# Patient Record
Sex: FEMALE | Race: OTHER | URBAN - METROPOLITAN AREA
[De-identification: names, ages, dates, MRNs, and addresses within clinical notes are randomized per-mention and may not be internally consistent; named-entity substitution may affect disease eponyms.]

---

## 2017-01-02 DIAGNOSIS — R11.0 NAUSEA: ICD-10-CM

## 2017-01-03 ENCOUNTER — OFFICE VISIT (OUTPATIENT)
Dept: PAIN MANAGEMENT | Age: 32
End: 2017-01-03

## 2017-01-03 VITALS
BODY MASS INDEX: 20.6 KG/M2 | HEART RATE: 109 BPM | SYSTOLIC BLOOD PRESSURE: 100 MMHG | WEIGHT: 120 LBS | DIASTOLIC BLOOD PRESSURE: 60 MMHG

## 2017-01-03 DIAGNOSIS — F51.01 PRIMARY INSOMNIA: ICD-10-CM

## 2017-01-03 DIAGNOSIS — R10.2 CHRONIC PELVIC PAIN IN FEMALE: ICD-10-CM

## 2017-01-03 DIAGNOSIS — M79.7 FIBROMYALGIA: ICD-10-CM

## 2017-01-03 DIAGNOSIS — F33.9 RECURRENT MAJOR DEPRESSIVE DISORDER, REMISSION STATUS UNSPECIFIED (HCC): ICD-10-CM

## 2017-01-03 DIAGNOSIS — G89.29 CHRONIC PELVIC PAIN IN FEMALE: ICD-10-CM

## 2017-01-03 DIAGNOSIS — G89.4 CHRONIC PAIN SYNDROME: ICD-10-CM

## 2017-01-03 PROCEDURE — 99213 OFFICE O/P EST LOW 20 MIN: CPT | Performed by: INTERNAL MEDICINE

## 2017-01-03 RX ORDER — NAPROXEN 500 MG/1
500 TABLET ORAL 2 TIMES DAILY WITH MEALS
Qty: 60 TABLET | Refills: 1 | Status: SHIPPED | OUTPATIENT
Start: 2017-01-03 | End: 2017-01-30 | Stop reason: SDUPTHER

## 2017-01-03 RX ORDER — GABAPENTIN 400 MG/1
CAPSULE ORAL
Qty: 120 CAPSULE | Refills: 1 | Status: SHIPPED | OUTPATIENT
Start: 2017-01-03 | End: 2017-01-30 | Stop reason: SDUPTHER

## 2017-01-03 RX ORDER — OXYCODONE HYDROCHLORIDE AND ACETAMINOPHEN 5; 325 MG/1; MG/1
TABLET ORAL
Qty: 30 TABLET | Refills: 0 | Status: SHIPPED | OUTPATIENT
Start: 2017-01-03 | End: 2017-01-30 | Stop reason: SDUPTHER

## 2017-01-03 RX ORDER — SERTRALINE HYDROCHLORIDE 100 MG/1
TABLET, FILM COATED ORAL
Qty: 30 TABLET | Refills: 1 | Status: SHIPPED | OUTPATIENT
Start: 2017-01-03 | End: 2017-01-30 | Stop reason: SDUPTHER

## 2017-01-03 RX ORDER — PROMETHAZINE HYDROCHLORIDE 25 MG/1
TABLET ORAL
Qty: 45 TABLET | Refills: 0 | Status: SHIPPED | OUTPATIENT
Start: 2017-01-03 | End: 2017-01-23 | Stop reason: SDUPTHER

## 2017-01-03 RX ORDER — CYCLOBENZAPRINE HCL 10 MG
TABLET ORAL
Qty: 30 TABLET | Refills: 1 | Status: SHIPPED | OUTPATIENT
Start: 2017-01-03 | End: 2017-01-30 | Stop reason: SDUPTHER

## 2017-01-06 ENCOUNTER — OFFICE VISIT (OUTPATIENT)
Dept: FAMILY MEDICINE CLINIC | Age: 32
End: 2017-01-06

## 2017-01-06 VITALS
WEIGHT: 139 LBS | HEART RATE: 118 BPM | DIASTOLIC BLOOD PRESSURE: 70 MMHG | BODY MASS INDEX: 23.86 KG/M2 | OXYGEN SATURATION: 96 % | SYSTOLIC BLOOD PRESSURE: 120 MMHG | RESPIRATION RATE: 16 BRPM

## 2017-01-06 DIAGNOSIS — J40 BRONCHITIS: Primary | ICD-10-CM

## 2017-01-06 DIAGNOSIS — K13.79 MOUTH SORES: ICD-10-CM

## 2017-01-06 PROCEDURE — 99213 OFFICE O/P EST LOW 20 MIN: CPT | Performed by: NURSE PRACTITIONER

## 2017-01-06 RX ORDER — AZITHROMYCIN 250 MG/1
250 TABLET, FILM COATED ORAL DAILY
Qty: 6 TABLET | Refills: 0 | Status: SHIPPED | OUTPATIENT
Start: 2017-01-06 | End: 2017-01-30 | Stop reason: ALTCHOICE

## 2017-01-06 RX ORDER — DEXTROMETHORPHAN HYDROBROMIDE AND PROMETHAZINE HYDROCHLORIDE 15; 6.25 MG/5ML; MG/5ML
5 SYRUP ORAL NIGHTLY PRN
Qty: 180 ML | Refills: 0 | Status: SHIPPED | OUTPATIENT
Start: 2017-01-06 | End: 2017-08-24 | Stop reason: ALTCHOICE

## 2017-01-06 ASSESSMENT — ENCOUNTER SYMPTOMS
SORE THROAT: 1
COUGH: 1
WHEEZING: 1
SHORTNESS OF BREATH: 1
SPUTUM PRODUCTION: 1

## 2017-01-08 LAB

## 2017-01-23 DIAGNOSIS — R11.0 NAUSEA: ICD-10-CM

## 2017-01-23 RX ORDER — PROMETHAZINE HYDROCHLORIDE 25 MG/1
TABLET ORAL
Qty: 45 TABLET | Refills: 0 | Status: SHIPPED | OUTPATIENT
Start: 2017-01-23 | End: 2017-02-08 | Stop reason: SDUPTHER

## 2017-01-30 ENCOUNTER — OFFICE VISIT (OUTPATIENT)
Dept: PAIN MANAGEMENT | Age: 32
End: 2017-01-30

## 2017-01-30 VITALS
HEART RATE: 104 BPM | BODY MASS INDEX: 23.86 KG/M2 | DIASTOLIC BLOOD PRESSURE: 63 MMHG | SYSTOLIC BLOOD PRESSURE: 107 MMHG | WEIGHT: 139 LBS

## 2017-01-30 DIAGNOSIS — G89.29 CHRONIC PELVIC PAIN IN FEMALE: ICD-10-CM

## 2017-01-30 DIAGNOSIS — G89.4 CHRONIC PAIN SYNDROME: ICD-10-CM

## 2017-01-30 DIAGNOSIS — M79.7 FIBROMYALGIA: ICD-10-CM

## 2017-01-30 DIAGNOSIS — N80.9 ENDOMETRIOSIS: ICD-10-CM

## 2017-01-30 DIAGNOSIS — R10.2 CHRONIC PELVIC PAIN IN FEMALE: ICD-10-CM

## 2017-01-30 PROCEDURE — 99213 OFFICE O/P EST LOW 20 MIN: CPT | Performed by: INTERNAL MEDICINE

## 2017-01-30 RX ORDER — CYCLOBENZAPRINE HCL 10 MG
TABLET ORAL
Qty: 30 TABLET | Refills: 1 | Status: SHIPPED | OUTPATIENT
Start: 2017-01-30 | End: 2017-03-27 | Stop reason: SDUPTHER

## 2017-01-30 RX ORDER — SERTRALINE HYDROCHLORIDE 100 MG/1
TABLET, FILM COATED ORAL
Qty: 30 TABLET | Refills: 1 | Status: SHIPPED | OUTPATIENT
Start: 2017-01-30 | End: 2017-02-27 | Stop reason: DRUGHIGH

## 2017-01-30 RX ORDER — OXYCODONE HYDROCHLORIDE AND ACETAMINOPHEN 5; 325 MG/1; MG/1
TABLET ORAL
Qty: 30 TABLET | Refills: 0 | Status: SHIPPED | OUTPATIENT
Start: 2017-01-30 | End: 2017-02-27 | Stop reason: SDUPTHER

## 2017-01-30 RX ORDER — NAPROXEN 500 MG/1
500 TABLET ORAL 2 TIMES DAILY WITH MEALS
Qty: 60 TABLET | Refills: 1 | Status: SHIPPED | OUTPATIENT
Start: 2017-01-30 | End: 2017-03-27 | Stop reason: SDUPTHER

## 2017-01-30 RX ORDER — GABAPENTIN 400 MG/1
CAPSULE ORAL
Qty: 120 CAPSULE | Refills: 1 | Status: SHIPPED | OUTPATIENT
Start: 2017-01-30 | End: 2017-03-27 | Stop reason: SDUPTHER

## 2017-02-08 DIAGNOSIS — R11.0 NAUSEA: ICD-10-CM

## 2017-02-08 RX ORDER — PROMETHAZINE HYDROCHLORIDE 25 MG/1
TABLET ORAL
Qty: 45 TABLET | Refills: 0 | Status: SHIPPED | OUTPATIENT
Start: 2017-02-08 | End: 2017-02-27 | Stop reason: SDUPTHER

## 2017-02-27 ENCOUNTER — OFFICE VISIT (OUTPATIENT)
Dept: PAIN MANAGEMENT | Age: 32
End: 2017-02-27

## 2017-02-27 VITALS
SYSTOLIC BLOOD PRESSURE: 101 MMHG | BODY MASS INDEX: 21.46 KG/M2 | DIASTOLIC BLOOD PRESSURE: 67 MMHG | HEART RATE: 90 BPM | WEIGHT: 125 LBS

## 2017-02-27 DIAGNOSIS — N94.89 PELVIC PAIN SYNDROME: ICD-10-CM

## 2017-02-27 DIAGNOSIS — M79.7 FIBROMYALGIA: ICD-10-CM

## 2017-02-27 DIAGNOSIS — F33.9 RECURRENT MAJOR DEPRESSIVE DISORDER, REMISSION STATUS UNSPECIFIED (HCC): ICD-10-CM

## 2017-02-27 DIAGNOSIS — N80.9 ENDOMETRIOSIS: ICD-10-CM

## 2017-02-27 DIAGNOSIS — F51.01 PRIMARY INSOMNIA: ICD-10-CM

## 2017-02-27 DIAGNOSIS — G89.4 CHRONIC PAIN SYNDROME: ICD-10-CM

## 2017-02-27 DIAGNOSIS — R11.0 NAUSEA: ICD-10-CM

## 2017-02-27 PROCEDURE — 99213 OFFICE O/P EST LOW 20 MIN: CPT | Performed by: INTERNAL MEDICINE

## 2017-02-27 RX ORDER — OXYCODONE HYDROCHLORIDE AND ACETAMINOPHEN 5; 325 MG/1; MG/1
TABLET ORAL
Qty: 30 TABLET | Refills: 0 | Status: SHIPPED | OUTPATIENT
Start: 2017-02-27 | End: 2017-03-27 | Stop reason: SDUPTHER

## 2017-02-28 RX ORDER — PROMETHAZINE HYDROCHLORIDE 25 MG/1
TABLET ORAL
Qty: 45 TABLET | Refills: 0 | Status: SHIPPED | OUTPATIENT
Start: 2017-02-28 | End: 2017-03-20 | Stop reason: SDUPTHER

## 2017-03-20 DIAGNOSIS — R11.0 NAUSEA: ICD-10-CM

## 2017-03-20 RX ORDER — PROMETHAZINE HYDROCHLORIDE 25 MG/1
TABLET ORAL
Qty: 45 TABLET | Refills: 0 | Status: SHIPPED | OUTPATIENT
Start: 2017-03-20 | End: 2017-04-04 | Stop reason: SDUPTHER

## 2017-03-27 ENCOUNTER — OFFICE VISIT (OUTPATIENT)
Dept: PAIN MANAGEMENT | Age: 32
End: 2017-03-27

## 2017-03-27 VITALS
HEART RATE: 95 BPM | SYSTOLIC BLOOD PRESSURE: 118 MMHG | WEIGHT: 125 LBS | BODY MASS INDEX: 21.46 KG/M2 | DIASTOLIC BLOOD PRESSURE: 70 MMHG

## 2017-03-27 DIAGNOSIS — M79.7 FIBROMYALGIA: ICD-10-CM

## 2017-03-27 DIAGNOSIS — G89.29 CHRONIC PELVIC PAIN IN FEMALE: ICD-10-CM

## 2017-03-27 DIAGNOSIS — R10.2 CHRONIC PELVIC PAIN IN FEMALE: ICD-10-CM

## 2017-03-27 DIAGNOSIS — F51.01 PRIMARY INSOMNIA: ICD-10-CM

## 2017-03-27 DIAGNOSIS — N94.89 PELVIC PAIN SYNDROME: ICD-10-CM

## 2017-03-27 DIAGNOSIS — F33.9 RECURRENT MAJOR DEPRESSIVE DISORDER, REMISSION STATUS UNSPECIFIED (HCC): ICD-10-CM

## 2017-03-27 DIAGNOSIS — G89.4 CHRONIC PAIN SYNDROME: ICD-10-CM

## 2017-03-27 PROCEDURE — 99213 OFFICE O/P EST LOW 20 MIN: CPT | Performed by: INTERNAL MEDICINE

## 2017-03-27 RX ORDER — OXYCODONE HYDROCHLORIDE AND ACETAMINOPHEN 5; 325 MG/1; MG/1
TABLET ORAL
Qty: 30 TABLET | Refills: 0 | Status: SHIPPED | OUTPATIENT
Start: 2017-03-27 | End: 2017-04-27 | Stop reason: SDUPTHER

## 2017-03-27 RX ORDER — GABAPENTIN 400 MG/1
CAPSULE ORAL
Qty: 120 CAPSULE | Refills: 1 | Status: SHIPPED | OUTPATIENT
Start: 2017-03-27 | End: 2017-04-27 | Stop reason: SDUPTHER

## 2017-03-27 RX ORDER — NAPROXEN 500 MG/1
500 TABLET ORAL 2 TIMES DAILY WITH MEALS
Qty: 60 TABLET | Refills: 1 | Status: SHIPPED | OUTPATIENT
Start: 2017-03-27 | End: 2017-04-27 | Stop reason: SDUPTHER

## 2017-03-27 RX ORDER — CYCLOBENZAPRINE HCL 10 MG
TABLET ORAL
Qty: 30 TABLET | Refills: 1 | Status: SHIPPED | OUTPATIENT
Start: 2017-03-27 | End: 2017-04-27 | Stop reason: SDUPTHER

## 2017-04-04 DIAGNOSIS — R11.0 NAUSEA: ICD-10-CM

## 2017-04-04 RX ORDER — PROMETHAZINE HYDROCHLORIDE 25 MG/1
TABLET ORAL
Qty: 45 TABLET | Refills: 0 | Status: SHIPPED | OUTPATIENT
Start: 2017-04-04 | End: 2017-04-20 | Stop reason: SDUPTHER

## 2017-04-14 DIAGNOSIS — G89.29 CHRONIC PELVIC PAIN IN FEMALE: ICD-10-CM

## 2017-04-14 DIAGNOSIS — G89.4 CHRONIC PAIN SYNDROME: ICD-10-CM

## 2017-04-14 DIAGNOSIS — M79.7 FIBROMYALGIA: ICD-10-CM

## 2017-04-14 DIAGNOSIS — R10.2 CHRONIC PELVIC PAIN IN FEMALE: ICD-10-CM

## 2017-04-14 RX ORDER — CYCLOBENZAPRINE HCL 10 MG
TABLET ORAL
Qty: 30 TABLET | Refills: 0 | OUTPATIENT
Start: 2017-04-14

## 2017-04-20 DIAGNOSIS — R11.0 NAUSEA: ICD-10-CM

## 2017-04-20 RX ORDER — PROMETHAZINE HYDROCHLORIDE 25 MG/1
TABLET ORAL
Qty: 45 TABLET | Refills: 0 | Status: SHIPPED | OUTPATIENT
Start: 2017-04-20 | End: 2017-05-05 | Stop reason: SDUPTHER

## 2017-04-27 ENCOUNTER — OFFICE VISIT (OUTPATIENT)
Dept: PAIN MANAGEMENT | Age: 32
End: 2017-04-27

## 2017-04-27 VITALS
BODY MASS INDEX: 25.23 KG/M2 | SYSTOLIC BLOOD PRESSURE: 104 MMHG | DIASTOLIC BLOOD PRESSURE: 62 MMHG | WEIGHT: 147 LBS | HEART RATE: 89 BPM

## 2017-04-27 DIAGNOSIS — G89.29 CHRONIC PELVIC PAIN IN FEMALE: ICD-10-CM

## 2017-04-27 DIAGNOSIS — M79.7 FIBROMYALGIA: ICD-10-CM

## 2017-04-27 DIAGNOSIS — G89.4 CHRONIC PAIN SYNDROME: ICD-10-CM

## 2017-04-27 DIAGNOSIS — N94.89 PELVIC PAIN SYNDROME: ICD-10-CM

## 2017-04-27 DIAGNOSIS — R10.2 CHRONIC PELVIC PAIN IN FEMALE: ICD-10-CM

## 2017-04-27 PROCEDURE — 99213 OFFICE O/P EST LOW 20 MIN: CPT | Performed by: INTERNAL MEDICINE

## 2017-04-27 RX ORDER — OXYCODONE HYDROCHLORIDE AND ACETAMINOPHEN 5; 325 MG/1; MG/1
TABLET ORAL
Qty: 42 TABLET | Refills: 0 | Status: SHIPPED | OUTPATIENT
Start: 2017-04-27 | End: 2017-06-08 | Stop reason: SDUPTHER

## 2017-04-27 RX ORDER — NAPROXEN 500 MG/1
500 TABLET ORAL 2 TIMES DAILY WITH MEALS
Qty: 60 TABLET | Refills: 1 | Status: SHIPPED | OUTPATIENT
Start: 2017-04-27 | End: 2017-06-08 | Stop reason: SDUPTHER

## 2017-04-27 RX ORDER — CYCLOBENZAPRINE HCL 10 MG
TABLET ORAL
Qty: 30 TABLET | Refills: 1 | Status: SHIPPED | OUTPATIENT
Start: 2017-04-27 | End: 2017-06-08 | Stop reason: SDUPTHER

## 2017-04-27 RX ORDER — GABAPENTIN 400 MG/1
CAPSULE ORAL
Qty: 120 CAPSULE | Refills: 1 | Status: SHIPPED | OUTPATIENT
Start: 2017-04-27 | End: 2017-06-08 | Stop reason: SDUPTHER

## 2017-05-05 DIAGNOSIS — R11.0 NAUSEA: ICD-10-CM

## 2017-05-05 RX ORDER — PROMETHAZINE HYDROCHLORIDE 25 MG/1
TABLET ORAL
Qty: 45 TABLET | Refills: 0 | Status: SHIPPED | OUTPATIENT
Start: 2017-05-05 | End: 2017-05-19 | Stop reason: SDUPTHER

## 2017-05-19 DIAGNOSIS — R11.0 NAUSEA: ICD-10-CM

## 2017-05-19 RX ORDER — PROMETHAZINE HYDROCHLORIDE 25 MG/1
TABLET ORAL
Qty: 45 TABLET | Refills: 0 | Status: SHIPPED | OUTPATIENT
Start: 2017-05-19 | End: 2017-06-09 | Stop reason: SDUPTHER

## 2017-05-22 ENCOUNTER — TELEPHONE (OUTPATIENT)
Dept: FAMILY MEDICINE CLINIC | Age: 32
End: 2017-05-22

## 2017-06-08 ENCOUNTER — OFFICE VISIT (OUTPATIENT)
Dept: PAIN MANAGEMENT | Age: 32
End: 2017-06-08

## 2017-06-08 VITALS
WEIGHT: 140 LBS | BODY MASS INDEX: 24.03 KG/M2 | HEART RATE: 89 BPM | DIASTOLIC BLOOD PRESSURE: 69 MMHG | SYSTOLIC BLOOD PRESSURE: 108 MMHG

## 2017-06-08 DIAGNOSIS — M79.7 FIBROMYALGIA: ICD-10-CM

## 2017-06-08 DIAGNOSIS — F51.01 PRIMARY INSOMNIA: ICD-10-CM

## 2017-06-08 DIAGNOSIS — G89.4 CHRONIC PAIN SYNDROME: ICD-10-CM

## 2017-06-08 DIAGNOSIS — N94.89 PELVIC PAIN SYNDROME: ICD-10-CM

## 2017-06-08 PROCEDURE — 99213 OFFICE O/P EST LOW 20 MIN: CPT | Performed by: INTERNAL MEDICINE

## 2017-06-08 RX ORDER — OXYCODONE HYDROCHLORIDE AND ACETAMINOPHEN 5; 325 MG/1; MG/1
TABLET ORAL
Qty: 42 TABLET | Refills: 0 | Status: SHIPPED | OUTPATIENT
Start: 2017-06-08 | End: 2017-07-20 | Stop reason: SDUPTHER

## 2017-06-08 RX ORDER — CYCLOBENZAPRINE HCL 10 MG
TABLET ORAL
Qty: 30 TABLET | Refills: 1 | Status: SHIPPED | OUTPATIENT
Start: 2017-06-08 | End: 2017-07-20 | Stop reason: SDUPTHER

## 2017-06-08 RX ORDER — NAPROXEN 500 MG/1
500 TABLET ORAL 2 TIMES DAILY WITH MEALS
Qty: 60 TABLET | Refills: 1 | Status: SHIPPED | OUTPATIENT
Start: 2017-06-08 | End: 2017-07-20 | Stop reason: SDUPTHER

## 2017-06-08 RX ORDER — GABAPENTIN 400 MG/1
CAPSULE ORAL
Qty: 120 CAPSULE | Refills: 1 | Status: SHIPPED | OUTPATIENT
Start: 2017-06-08 | End: 2017-07-20 | Stop reason: SDUPTHER

## 2017-06-09 DIAGNOSIS — R11.0 NAUSEA: ICD-10-CM

## 2017-06-09 RX ORDER — PROMETHAZINE HYDROCHLORIDE 25 MG/1
TABLET ORAL
Qty: 45 TABLET | Refills: 0 | Status: SHIPPED | OUTPATIENT
Start: 2017-06-09 | End: 2017-06-29 | Stop reason: SDUPTHER

## 2017-06-29 DIAGNOSIS — R11.0 NAUSEA: ICD-10-CM

## 2017-06-29 RX ORDER — PROMETHAZINE HYDROCHLORIDE 25 MG/1
TABLET ORAL
Qty: 45 TABLET | Refills: 0 | Status: SHIPPED | OUTPATIENT
Start: 2017-06-29 | End: 2017-07-20 | Stop reason: SDUPTHER

## 2017-07-20 ENCOUNTER — OFFICE VISIT (OUTPATIENT)
Dept: PAIN MANAGEMENT | Age: 32
End: 2017-07-20

## 2017-07-20 VITALS
WEIGHT: 145 LBS | HEART RATE: 90 BPM | DIASTOLIC BLOOD PRESSURE: 60 MMHG | SYSTOLIC BLOOD PRESSURE: 100 MMHG | BODY MASS INDEX: 24.89 KG/M2

## 2017-07-20 DIAGNOSIS — R11.0 NAUSEA: ICD-10-CM

## 2017-07-20 DIAGNOSIS — M79.7 FIBROMYALGIA: ICD-10-CM

## 2017-07-20 DIAGNOSIS — G89.4 CHRONIC PAIN SYNDROME: ICD-10-CM

## 2017-07-20 DIAGNOSIS — N94.89 PELVIC PAIN SYNDROME: ICD-10-CM

## 2017-07-20 PROCEDURE — 99213 OFFICE O/P EST LOW 20 MIN: CPT | Performed by: INTERNAL MEDICINE

## 2017-07-20 RX ORDER — CYCLOBENZAPRINE HCL 10 MG
TABLET ORAL
Qty: 30 TABLET | Refills: 1 | Status: SHIPPED | OUTPATIENT
Start: 2017-07-20 | End: 2017-08-31 | Stop reason: SDUPTHER

## 2017-07-20 RX ORDER — PROMETHAZINE HYDROCHLORIDE 25 MG/1
TABLET ORAL
Qty: 45 TABLET | Refills: 0 | Status: SHIPPED | OUTPATIENT
Start: 2017-07-20 | End: 2017-08-04 | Stop reason: SDUPTHER

## 2017-07-20 RX ORDER — NAPROXEN 500 MG/1
500 TABLET ORAL 2 TIMES DAILY WITH MEALS
Qty: 60 TABLET | Refills: 1 | Status: SHIPPED | OUTPATIENT
Start: 2017-07-20 | End: 2017-08-31 | Stop reason: SDUPTHER

## 2017-07-20 RX ORDER — GABAPENTIN 400 MG/1
CAPSULE ORAL
Qty: 120 CAPSULE | Refills: 1 | Status: SHIPPED | OUTPATIENT
Start: 2017-07-20 | End: 2017-08-31 | Stop reason: SDUPTHER

## 2017-07-20 RX ORDER — OXYCODONE HYDROCHLORIDE AND ACETAMINOPHEN 5; 325 MG/1; MG/1
TABLET ORAL
Qty: 42 TABLET | Refills: 0 | Status: SHIPPED | OUTPATIENT
Start: 2017-07-20 | End: 2017-08-31 | Stop reason: SDUPTHER

## 2017-08-04 DIAGNOSIS — R11.0 NAUSEA: ICD-10-CM

## 2017-08-07 RX ORDER — PROMETHAZINE HYDROCHLORIDE 25 MG/1
TABLET ORAL
Qty: 45 TABLET | Refills: 0 | Status: SHIPPED | OUTPATIENT
Start: 2017-08-07 | End: 2017-08-22 | Stop reason: SDUPTHER

## 2017-08-22 DIAGNOSIS — R11.0 NAUSEA: ICD-10-CM

## 2017-08-22 RX ORDER — PROMETHAZINE HYDROCHLORIDE 25 MG/1
TABLET ORAL
Qty: 45 TABLET | Refills: 0 | Status: SHIPPED | OUTPATIENT
Start: 2017-08-22 | End: 2017-09-03 | Stop reason: SDUPTHER

## 2017-08-24 ENCOUNTER — OFFICE VISIT (OUTPATIENT)
Dept: FAMILY MEDICINE CLINIC | Age: 32
End: 2017-08-24

## 2017-08-24 VITALS
BODY MASS INDEX: 25.44 KG/M2 | OXYGEN SATURATION: 98 % | TEMPERATURE: 99 F | DIASTOLIC BLOOD PRESSURE: 60 MMHG | SYSTOLIC BLOOD PRESSURE: 112 MMHG | HEIGHT: 64 IN | WEIGHT: 149 LBS | HEART RATE: 93 BPM

## 2017-08-24 DIAGNOSIS — B37.0 THRUSH: Primary | ICD-10-CM

## 2017-08-24 PROCEDURE — 99212 OFFICE O/P EST SF 10 MIN: CPT | Performed by: NURSE PRACTITIONER

## 2017-08-24 RX ORDER — FLUCONAZOLE 200 MG/1
200 TABLET ORAL ONCE
Qty: 1 TABLET | Refills: 0 | Status: SHIPPED | OUTPATIENT
Start: 2017-08-24 | End: 2017-08-24

## 2017-08-24 RX ORDER — FLUCONAZOLE 100 MG/1
100 TABLET ORAL DAILY
Qty: 6 TABLET | Refills: 0 | Status: SHIPPED | OUTPATIENT
Start: 2017-08-24 | End: 2017-08-30

## 2017-08-31 ENCOUNTER — OFFICE VISIT (OUTPATIENT)
Dept: PAIN MANAGEMENT | Age: 32
End: 2017-08-31

## 2017-08-31 VITALS
BODY MASS INDEX: 25.23 KG/M2 | WEIGHT: 147 LBS | SYSTOLIC BLOOD PRESSURE: 107 MMHG | HEART RATE: 93 BPM | DIASTOLIC BLOOD PRESSURE: 72 MMHG

## 2017-08-31 DIAGNOSIS — N94.89 PELVIC PAIN SYNDROME: ICD-10-CM

## 2017-08-31 DIAGNOSIS — G89.4 CHRONIC PAIN SYNDROME: ICD-10-CM

## 2017-08-31 DIAGNOSIS — M79.7 FIBROMYALGIA: ICD-10-CM

## 2017-08-31 DIAGNOSIS — N80.9 ENDOMETRIOSIS: ICD-10-CM

## 2017-08-31 PROCEDURE — 99213 OFFICE O/P EST LOW 20 MIN: CPT | Performed by: INTERNAL MEDICINE

## 2017-08-31 RX ORDER — CYCLOBENZAPRINE HCL 10 MG
TABLET ORAL
Qty: 30 TABLET | Refills: 1 | Status: SHIPPED | OUTPATIENT
Start: 2017-08-31 | End: 2017-10-12 | Stop reason: SDUPTHER

## 2017-08-31 RX ORDER — NAPROXEN 500 MG/1
500 TABLET ORAL 2 TIMES DAILY WITH MEALS
Qty: 60 TABLET | Refills: 1 | Status: SHIPPED | OUTPATIENT
Start: 2017-08-31 | End: 2017-10-12 | Stop reason: SDUPTHER

## 2017-08-31 RX ORDER — GABAPENTIN 400 MG/1
CAPSULE ORAL
Qty: 120 CAPSULE | Refills: 1 | Status: SHIPPED | OUTPATIENT
Start: 2017-08-31 | End: 2017-10-12 | Stop reason: SDUPTHER

## 2017-08-31 RX ORDER — OXYCODONE HYDROCHLORIDE AND ACETAMINOPHEN 5; 325 MG/1; MG/1
TABLET ORAL
Qty: 42 TABLET | Refills: 0 | Status: SHIPPED | OUTPATIENT
Start: 2017-08-31 | End: 2017-10-12 | Stop reason: SDUPTHER

## 2017-09-03 DIAGNOSIS — N94.89 PELVIC PAIN SYNDROME: ICD-10-CM

## 2017-09-03 DIAGNOSIS — M79.7 FIBROMYALGIA: ICD-10-CM

## 2017-09-03 DIAGNOSIS — R11.0 NAUSEA: ICD-10-CM

## 2017-09-03 DIAGNOSIS — G89.4 CHRONIC PAIN SYNDROME: ICD-10-CM

## 2017-09-05 RX ORDER — PROMETHAZINE HYDROCHLORIDE 25 MG/1
TABLET ORAL
Qty: 45 TABLET | Refills: 0 | Status: SHIPPED | OUTPATIENT
Start: 2017-09-05 | End: 2017-09-21 | Stop reason: SDUPTHER

## 2017-09-05 RX ORDER — GABAPENTIN 400 MG/1
CAPSULE ORAL
Qty: 120 CAPSULE | Refills: 0 | OUTPATIENT
Start: 2017-09-05

## 2017-09-06 ENCOUNTER — TELEPHONE (OUTPATIENT)
Dept: FAMILY MEDICINE CLINIC | Age: 32
End: 2017-09-06

## 2017-09-06 RX ORDER — FLUCONAZOLE 150 MG/1
150 TABLET ORAL ONCE
Qty: 1 TABLET | Refills: 3 | Status: SHIPPED | OUTPATIENT
Start: 2017-09-06 | End: 2017-09-06

## 2017-09-21 DIAGNOSIS — R11.0 NAUSEA: ICD-10-CM

## 2017-09-21 RX ORDER — PROMETHAZINE HYDROCHLORIDE 25 MG/1
TABLET ORAL
Qty: 45 TABLET | Refills: 0 | Status: SHIPPED | OUTPATIENT
Start: 2017-09-21 | End: 2017-10-06 | Stop reason: SDUPTHER

## 2017-10-06 DIAGNOSIS — R11.0 NAUSEA: ICD-10-CM

## 2017-10-09 RX ORDER — PROMETHAZINE HYDROCHLORIDE 25 MG/1
TABLET ORAL
Qty: 45 TABLET | Refills: 0 | Status: SHIPPED | OUTPATIENT
Start: 2017-10-09 | End: 2017-10-23 | Stop reason: SDUPTHER

## 2017-10-12 ENCOUNTER — OFFICE VISIT (OUTPATIENT)
Dept: PAIN MANAGEMENT | Age: 32
End: 2017-10-12

## 2017-10-12 VITALS
WEIGHT: 147 LBS | SYSTOLIC BLOOD PRESSURE: 114 MMHG | DIASTOLIC BLOOD PRESSURE: 71 MMHG | HEART RATE: 92 BPM | BODY MASS INDEX: 25.23 KG/M2

## 2017-10-12 DIAGNOSIS — N94.89 PELVIC PAIN SYNDROME: ICD-10-CM

## 2017-10-12 DIAGNOSIS — M79.7 FIBROMYALGIA: ICD-10-CM

## 2017-10-12 DIAGNOSIS — F51.01 PRIMARY INSOMNIA: ICD-10-CM

## 2017-10-12 DIAGNOSIS — N80.9 ENDOMETRIOSIS: ICD-10-CM

## 2017-10-12 DIAGNOSIS — F33.41 RECURRENT MAJOR DEPRESSIVE DISORDER, IN PARTIAL REMISSION (HCC): ICD-10-CM

## 2017-10-12 DIAGNOSIS — G89.4 CHRONIC PAIN SYNDROME: ICD-10-CM

## 2017-10-12 PROCEDURE — 99213 OFFICE O/P EST LOW 20 MIN: CPT | Performed by: INTERNAL MEDICINE

## 2017-10-12 RX ORDER — CYCLOBENZAPRINE HCL 10 MG
TABLET ORAL
Qty: 30 TABLET | Refills: 1 | Status: SHIPPED | OUTPATIENT
Start: 2017-10-12 | End: 2017-11-27 | Stop reason: SDUPTHER

## 2017-10-12 RX ORDER — OXYCODONE HYDROCHLORIDE AND ACETAMINOPHEN 5; 325 MG/1; MG/1
TABLET ORAL
Qty: 42 TABLET | Refills: 0 | Status: SHIPPED | OUTPATIENT
Start: 2017-10-12 | End: 2017-11-27 | Stop reason: SDUPTHER

## 2017-10-12 RX ORDER — GABAPENTIN 400 MG/1
CAPSULE ORAL
Qty: 120 CAPSULE | Refills: 1 | Status: SHIPPED | OUTPATIENT
Start: 2017-10-12 | End: 2017-11-27 | Stop reason: SDUPTHER

## 2017-10-12 RX ORDER — NAPROXEN 500 MG/1
500 TABLET ORAL 2 TIMES DAILY WITH MEALS
Qty: 60 TABLET | Refills: 1 | Status: SHIPPED | OUTPATIENT
Start: 2017-10-12 | End: 2017-11-27 | Stop reason: SDUPTHER

## 2017-10-23 DIAGNOSIS — R11.0 NAUSEA: ICD-10-CM

## 2017-10-23 RX ORDER — PROMETHAZINE HYDROCHLORIDE 25 MG/1
TABLET ORAL
Qty: 45 TABLET | Refills: 0 | Status: SHIPPED | OUTPATIENT
Start: 2017-10-23 | End: 2017-11-08 | Stop reason: SDUPTHER

## 2017-11-08 DIAGNOSIS — R11.0 NAUSEA: ICD-10-CM

## 2017-11-08 RX ORDER — PROMETHAZINE HYDROCHLORIDE 25 MG/1
TABLET ORAL
Qty: 45 TABLET | Refills: 0 | Status: SHIPPED | OUTPATIENT
Start: 2017-11-08 | End: 2017-11-22 | Stop reason: SDUPTHER

## 2017-11-21 DIAGNOSIS — G89.4 CHRONIC PAIN SYNDROME: ICD-10-CM

## 2017-11-21 DIAGNOSIS — N94.89 PELVIC PAIN SYNDROME: ICD-10-CM

## 2017-11-21 DIAGNOSIS — M79.7 FIBROMYALGIA: ICD-10-CM

## 2017-11-21 RX ORDER — CYCLOBENZAPRINE HCL 10 MG
TABLET ORAL
Qty: 30 TABLET | Refills: 0 | OUTPATIENT
Start: 2017-11-21

## 2017-11-22 DIAGNOSIS — R11.0 NAUSEA: ICD-10-CM

## 2017-11-22 RX ORDER — PROMETHAZINE HYDROCHLORIDE 25 MG/1
TABLET ORAL
Qty: 45 TABLET | Refills: 0 | Status: SHIPPED | OUTPATIENT
Start: 2017-11-22 | End: 2017-12-06 | Stop reason: SDUPTHER

## 2017-11-27 ENCOUNTER — OFFICE VISIT (OUTPATIENT)
Dept: PAIN MANAGEMENT | Age: 32
End: 2017-11-27

## 2017-11-27 VITALS
DIASTOLIC BLOOD PRESSURE: 45 MMHG | BODY MASS INDEX: 24.03 KG/M2 | SYSTOLIC BLOOD PRESSURE: 100 MMHG | HEART RATE: 86 BPM | WEIGHT: 140 LBS

## 2017-11-27 DIAGNOSIS — F51.01 PRIMARY INSOMNIA: ICD-10-CM

## 2017-11-27 DIAGNOSIS — F33.41 RECURRENT MAJOR DEPRESSIVE DISORDER, IN PARTIAL REMISSION (HCC): ICD-10-CM

## 2017-11-27 DIAGNOSIS — N94.89 PELVIC PAIN SYNDROME: ICD-10-CM

## 2017-11-27 DIAGNOSIS — M79.7 FIBROMYALGIA: ICD-10-CM

## 2017-11-27 DIAGNOSIS — N80.9 ENDOMETRIOSIS: ICD-10-CM

## 2017-11-27 DIAGNOSIS — G89.4 CHRONIC PAIN SYNDROME: ICD-10-CM

## 2017-11-27 PROCEDURE — 99213 OFFICE O/P EST LOW 20 MIN: CPT | Performed by: INTERNAL MEDICINE

## 2017-11-27 RX ORDER — OXYCODONE HYDROCHLORIDE AND ACETAMINOPHEN 5; 325 MG/1; MG/1
TABLET ORAL
Qty: 42 TABLET | Refills: 0 | Status: SHIPPED | OUTPATIENT
Start: 2017-11-27 | End: 2018-01-08 | Stop reason: SDUPTHER

## 2017-11-27 RX ORDER — NAPROXEN 500 MG/1
500 TABLET ORAL 2 TIMES DAILY WITH MEALS
Qty: 60 TABLET | Refills: 1 | Status: SHIPPED | OUTPATIENT
Start: 2017-11-27 | End: 2018-01-08 | Stop reason: SDUPTHER

## 2017-11-27 RX ORDER — GABAPENTIN 400 MG/1
CAPSULE ORAL
Qty: 120 CAPSULE | Refills: 1 | Status: SHIPPED | OUTPATIENT
Start: 2017-11-27 | End: 2018-01-08 | Stop reason: SDUPTHER

## 2017-11-27 RX ORDER — CYCLOBENZAPRINE HCL 10 MG
TABLET ORAL
Qty: 30 TABLET | Refills: 1 | Status: SHIPPED | OUTPATIENT
Start: 2017-11-27 | End: 2018-01-08

## 2017-11-27 NOTE — PROGRESS NOTES
[facility]  1985  [id number]    HISTORY OF PRESENT ILLNESS:  Ms. Kory Antonio is a 28 y.o. female returns for a follow up visit for multiple medical problems. Her current presenting problems are   1. Chronic pain syndrome    2. Fibromyalgia    3. Pelvic pain syndrome    4. Endometriosis    5. Primary insomnia    6. Recurrent major depressive disorder, in partial remission ([facility] [address]    . As per information/history obtained from the PADT(patient assessment and documentation tool) - She complains of pain in the neck, upper back and lower back with radiation to the shoulders Bilateral, arms Bilateral, elbows Bilateral and knees Bilateral She rates the pain 7/10 and describes it as sharp, aching. Pain is made worse by: sitting, lifting. Current treatment regimen has helped relieve about 30% of the pain. She denies side effects from the current pain regimen. Patient reports that since the last follow up visit the physical functioning is worse, family/social relationships are better, mood is worse and sleep patterns are unchanged, and that the overall functioning is worse. Patient denies neurological bowel or bladder. Patient denies misusing/abusing her narcotic pain medications or using any illegal drugs. There are No indicators for possible drug abuse, addiction or diversion problems. Upon obtaining the medical history from Ms. Kory Antonio regarding today's office visit for her presenting problems, Patient states she has been doing ok with the medications. She states she has been having some dental issues. Ms. Vasquez Cordova mentions using Percocet 1 per day PRN, has a few pills left. She mentions she is working full time. Patient's  subjective report of her mood is fair. she describes occasional symptoms of depression, occasional  irritability and some mood swings. Describes her mood as being neutral and reports some pleasure in her daily activities. Reports  fair  appetite, energy and concentration.  Able to

## 2017-12-06 DIAGNOSIS — R11.0 NAUSEA: ICD-10-CM

## 2017-12-06 RX ORDER — PROMETHAZINE HYDROCHLORIDE 25 MG/1
TABLET ORAL
Qty: 45 TABLET | Refills: 0 | Status: SHIPPED | OUTPATIENT
Start: 2017-12-06 | End: 2017-12-26 | Stop reason: SDUPTHER

## 2017-12-11 ENCOUNTER — TELEPHONE (OUTPATIENT)
Dept: PAIN MANAGEMENT | Age: 32
End: 2017-12-11

## 2017-12-11 NOTE — TELEPHONE ENCOUNTER
Pt calling back, advised her of the need for a pill count. She states she is out of town for the holidays and cannot come into the office. Advised she could take her medications in the bottles to her nearest pharmacy and they could do the pill count for her and fax us the result. Advised that the fax would need to be on pharmacy letterhead and their contact information would need to also be included so we could verify with them once received. Gave the pt our fax number, she states she will go to the pharmacy for this.

## 2017-12-14 NOTE — TELEPHONE ENCOUNTER
Patient came in office today for a pill count. She brought in her Flexeril 10 mg, Gabapentin 400 mg and Percocet 5/325 mg. Patient had Flexeril 10 mg filled on 11.22.17 for a qty of 30  She had a total of 15 tablets, the description on bottle matched the tablets. Patients Gabapentin 400 mg was filled on 11/30/2017 for a Qty 120, the description on the the capsule match the Description on the bottle. Patients Percocet 5/325 mg was filled on 11/27/2017 for 42 tablets take 1/2 tablet po Q6H max 1-2 per day. The tablets in the bottle match the description on the bottle. Patient should have 25 tablets left but only have 11/2 left. Patient stated she had a lot of dental pain and had to take extra tablets. Pill count was witnessed by patient and Jeanette RIVERA. Per [facility] will discuss results with patient at next office visit.

## 2017-12-26 DIAGNOSIS — R11.0 NAUSEA: ICD-10-CM

## 2017-12-26 RX ORDER — PROMETHAZINE HYDROCHLORIDE 25 MG/1
TABLET ORAL
Qty: 45 TABLET | Refills: 0 | Status: SHIPPED | OUTPATIENT
Start: 2017-12-26 | End: 2018-01-11 | Stop reason: SDUPTHER

## 2018-01-08 ENCOUNTER — OFFICE VISIT (OUTPATIENT)
Dept: PAIN MANAGEMENT | Age: 33
End: 2018-01-08

## 2018-01-08 VITALS
WEIGHT: 140 LBS | BODY MASS INDEX: 24.03 KG/M2 | HEART RATE: 90 BPM | SYSTOLIC BLOOD PRESSURE: 109 MMHG | DIASTOLIC BLOOD PRESSURE: 69 MMHG

## 2018-01-08 DIAGNOSIS — N80.9 ENDOMETRIOSIS: ICD-10-CM

## 2018-01-08 DIAGNOSIS — N94.89 PELVIC PAIN SYNDROME: ICD-10-CM

## 2018-01-08 DIAGNOSIS — G89.4 CHRONIC PAIN SYNDROME: ICD-10-CM

## 2018-01-08 DIAGNOSIS — M79.7 FIBROMYALGIA: ICD-10-CM

## 2018-01-08 PROCEDURE — 99213 OFFICE O/P EST LOW 20 MIN: CPT | Performed by: INTERNAL MEDICINE

## 2018-01-08 RX ORDER — GABAPENTIN 400 MG/1
CAPSULE ORAL
Qty: 120 CAPSULE | Refills: 1 | Status: SHIPPED | OUTPATIENT
Start: 2018-01-08 | End: 2018-02-19 | Stop reason: SDUPTHER

## 2018-01-08 RX ORDER — NAPROXEN 500 MG/1
500 TABLET ORAL 2 TIMES DAILY WITH MEALS
Qty: 60 TABLET | Refills: 1 | Status: SHIPPED | OUTPATIENT
Start: 2018-01-08 | End: 2018-02-19 | Stop reason: SDUPTHER

## 2018-01-08 RX ORDER — OXYCODONE HYDROCHLORIDE AND ACETAMINOPHEN 5; 325 MG/1; MG/1
TABLET ORAL
Qty: 42 TABLET | Refills: 0 | Status: SHIPPED | OUTPATIENT
Start: 2018-01-08 | End: 2018-02-19 | Stop reason: SDUPTHER

## 2018-01-08 RX ORDER — NORTRIPTYLINE HYDROCHLORIDE 25 MG/1
25-50 CAPSULE ORAL NIGHTLY
Qty: 60 CAPSULE | Refills: 1 | Status: SHIPPED | OUTPATIENT
Start: 2018-01-08 | End: 2018-02-19 | Stop reason: ALTCHOICE

## 2018-01-08 NOTE — PROGRESS NOTES
mouth      Probiotic Product (PROBIOTIC DAILY PO) Take by mouth daily      lidocaine viscous-aluminum & magnesium hydroxide-simethicone-diphenhydrAMINE-nystatin Take 5 mLs by mouth every 4 hours 180 mL 0    calcium carbonate (OSCAL) 500 MG TABS tablet Take 500 mg by mouth daily.  calcium carbonate (TUMS) 500 MG chewable tablet Take 1 tablet by mouth daily.  Magnesium Oxide (MAG-200 PO) Take 1 tablet by mouth daily.  Multiple Vitamin (MULTI-VITAMIN DAILY PO) Take  by mouth.  Omega-3 Fatty Acids (FISH OIL) 1000 MG CAPS Take 1,000 mg by mouth daily. No current facility-administered medications for this visit. I will continue her current medication regimen  which is part of the above treatment schedule. It has been helping with Ms. Luly Collado chronic  medical problems which for this visit include:   Diagnoses of Chronic pain syndrome, Fibromyalgia, Pelvic pain syndrome, Endometriosis, Primary insomnia, and Recurrent major depressive disorder, in partial remission ([address] were pertinent to this visit. Risks and benefits of the medications and other alternative treatments  including no treatment were discussed with the patient. The common side effects of these medications were also explained to the patient. Informed verbal consent was obtained. Goals of current treatment regimen include improvement in pain, restoration of functioning- with focus on improvement in physical performance, general activity, work or disability,emotional distress, health care utilization and  decreased medication consumption. Will continue to monitor progress towards achieving/maintaining therapeutic goals with special emphasis on  1. Improvement in perceived interfernce  of pain with ADL's. Ability to do home exercises independently. Ability to do household chores indoor and/or outdoor work and social and leisure activities. Improve psychosocial and physical functioning. - she is showing progression

## 2018-01-11 DIAGNOSIS — R11.0 NAUSEA: ICD-10-CM

## 2018-01-11 RX ORDER — PROMETHAZINE HYDROCHLORIDE 25 MG/1
TABLET ORAL
Qty: 45 TABLET | Refills: 0 | Status: SHIPPED | OUTPATIENT
Start: 2018-01-11 | End: 2018-01-26 | Stop reason: SDUPTHER

## 2018-01-26 DIAGNOSIS — R11.0 NAUSEA: ICD-10-CM

## 2018-01-29 RX ORDER — PROMETHAZINE HYDROCHLORIDE 25 MG/1
TABLET ORAL
Qty: 45 TABLET | Refills: 0 | Status: SHIPPED | OUTPATIENT
Start: 2018-01-29 | End: 2018-02-14 | Stop reason: SDUPTHER

## 2018-02-14 DIAGNOSIS — R11.0 NAUSEA: ICD-10-CM

## 2018-02-14 RX ORDER — PROMETHAZINE HYDROCHLORIDE 25 MG/1
TABLET ORAL
Qty: 45 TABLET | Refills: 0 | Status: SHIPPED | OUTPATIENT
Start: 2018-02-14 | End: 2018-03-02 | Stop reason: SDUPTHER

## 2018-02-19 ENCOUNTER — OFFICE VISIT (OUTPATIENT)
Dept: PAIN MANAGEMENT | Age: 33
End: 2018-02-19

## 2018-02-19 VITALS
BODY MASS INDEX: 23.17 KG/M2 | DIASTOLIC BLOOD PRESSURE: 71 MMHG | WEIGHT: 135 LBS | SYSTOLIC BLOOD PRESSURE: 116 MMHG | HEART RATE: 105 BPM

## 2018-02-19 DIAGNOSIS — F51.01 PRIMARY INSOMNIA: ICD-10-CM

## 2018-02-19 DIAGNOSIS — G89.4 CHRONIC PAIN SYNDROME: ICD-10-CM

## 2018-02-19 DIAGNOSIS — N94.89 PELVIC PAIN SYNDROME: ICD-10-CM

## 2018-02-19 DIAGNOSIS — M79.7 FIBROMYALGIA: ICD-10-CM

## 2018-02-19 DIAGNOSIS — N80.9 ENDOMETRIOSIS: ICD-10-CM

## 2018-02-19 PROCEDURE — 99213 OFFICE O/P EST LOW 20 MIN: CPT | Performed by: INTERNAL MEDICINE

## 2018-02-19 RX ORDER — AMITRIPTYLINE HYDROCHLORIDE 25 MG/1
25-50 TABLET, FILM COATED ORAL NIGHTLY
Qty: 60 TABLET | Refills: 1 | Status: SHIPPED | OUTPATIENT
Start: 2018-02-19 | End: 2018-04-02 | Stop reason: ALTCHOICE

## 2018-02-19 RX ORDER — OXYCODONE HYDROCHLORIDE AND ACETAMINOPHEN 5; 325 MG/1; MG/1
TABLET ORAL
Qty: 42 TABLET | Refills: 0 | Status: SHIPPED | OUTPATIENT
Start: 2018-02-19 | End: 2018-04-02 | Stop reason: SDUPTHER

## 2018-02-19 RX ORDER — GABAPENTIN 400 MG/1
CAPSULE ORAL
Qty: 120 CAPSULE | Refills: 1 | Status: SHIPPED | OUTPATIENT
Start: 2018-02-19 | End: 2018-04-02 | Stop reason: SDUPTHER

## 2018-02-19 RX ORDER — NAPROXEN 500 MG/1
500 TABLET ORAL 2 TIMES DAILY WITH MEALS
Qty: 60 TABLET | Refills: 1 | Status: SHIPPED | OUTPATIENT
Start: 2018-02-19 | End: 2018-04-02 | Stop reason: SDUPTHER

## 2018-02-19 NOTE — PROGRESS NOTES
Sheila Gutierrez Jilg  1985  [id number]    HISTORY OF PRESENT ILLNESS:  Ms. Stacy Bauer is a 28 y.o. female returns for a follow up visit for multiple medical problems. Her current presenting problems are   1. Chronic pain syndrome    2. Primary insomnia    3. Pelvic pain syndrome    4. Fibromyalgia    5. Endometriosis    . As per information/history obtained from the PADT(patient assessment and documentation tool) - She complains of pain in the neck and lower back with radiation to the shoulders Bilateral, arms Bilateral, elbows Bilateral, hands Bilateral, buttocks, hips Bilateral, upper leg Bilateral, knees Bilateral, lower leg Bilateral, ankles Bilateral and feet Bilateral She rates the pain 7/10 and describes it as N/A, aching. Pain is made worse by: movement, walking, standing, sitting, bending, lifting. Current treatment regimen has helped relieve about 30% of the pain. She denies side effects from the current pain regimen. Patient reports that since the last follow up visit the physical functioning is unchanged, family/social relationships are unchanged, mood is unchanged and sleep patterns are unchanged, and that the overall functioning is unchanged. Patient denies neurological bowel or bladder. Patient denies misusing/abusing her narcotic pain medications or using any illegal drugs. There are No indicators for possible drug abuse, addiction or diversion problems. Upon obtaining the medical history from Ms. Stacy Bauer regarding today's office visit for her presenting problems,  Patient states she has been doing okay with the medications. Sleep is poor,  poor sleep latency, averages 2-3 hours of sleep at night. Intermittent, non restorative. Does not feel rested in AM. Complains of feeling sleepy  during the day. She says the Pamelor is not helping. She complain she is getting cramping in abdominal and pelvic. She reports she is working aprt time. She denies any constipation symptoms.  She states

## 2018-03-02 DIAGNOSIS — R11.0 NAUSEA: ICD-10-CM

## 2018-03-02 RX ORDER — PROMETHAZINE HYDROCHLORIDE 25 MG/1
TABLET ORAL
Qty: 45 TABLET | Refills: 0 | Status: SHIPPED | OUTPATIENT
Start: 2018-03-02 | End: 2018-03-20 | Stop reason: SDUPTHER

## 2018-03-02 NOTE — TELEPHONE ENCOUNTER
Requested Prescriptions     Pending Prescriptions Disp Refills    promethazine (PHENERGAN) 25 MG tablet 45 tablet 0   Pharmacy is [facility] on [address] [facility].

## 2018-03-20 DIAGNOSIS — R11.0 NAUSEA: ICD-10-CM

## 2018-03-20 RX ORDER — PROMETHAZINE HYDROCHLORIDE 25 MG/1
TABLET ORAL
Qty: 45 TABLET | Refills: 0 | Status: SHIPPED | OUTPATIENT
Start: 2018-03-20 | End: 2018-04-09 | Stop reason: SDUPTHER

## 2018-03-20 NOTE — TELEPHONE ENCOUNTER
Patient called and is requesting a refill on her prescription for Promethazine. Patient states that there are some days that she has had to take it every 6 hours and some days that she does not have to take it as often. Patient states that she has 4 tablets left. Please call patient when rx is sent to [address] [place] [address] on [facility].     Thanks

## 2018-04-02 ENCOUNTER — OFFICE VISIT (OUTPATIENT)
Dept: PAIN MANAGEMENT | Age: 33
End: 2018-04-02

## 2018-04-02 VITALS
HEART RATE: 78 BPM | BODY MASS INDEX: 23.17 KG/M2 | WEIGHT: 135 LBS | SYSTOLIC BLOOD PRESSURE: 104 MMHG | DIASTOLIC BLOOD PRESSURE: 59 MMHG

## 2018-04-02 DIAGNOSIS — N80.9 ENDOMETRIOSIS: ICD-10-CM

## 2018-04-02 DIAGNOSIS — G89.4 CHRONIC PAIN SYNDROME: ICD-10-CM

## 2018-04-02 DIAGNOSIS — F51.01 PRIMARY INSOMNIA: ICD-10-CM

## 2018-04-02 DIAGNOSIS — M79.7 FIBROMYALGIA: ICD-10-CM

## 2018-04-02 DIAGNOSIS — N94.89 PELVIC PAIN SYNDROME: ICD-10-CM

## 2018-04-02 PROCEDURE — 99213 OFFICE O/P EST LOW 20 MIN: CPT | Performed by: INTERNAL MEDICINE

## 2018-04-02 RX ORDER — GABAPENTIN 400 MG/1
CAPSULE ORAL
Qty: 120 CAPSULE | Refills: 1 | Status: SHIPPED | OUTPATIENT
Start: 2018-04-02 | End: 2018-05-14 | Stop reason: SDUPTHER

## 2018-04-02 RX ORDER — OXYCODONE HYDROCHLORIDE AND ACETAMINOPHEN 5; 325 MG/1; MG/1
TABLET ORAL
Qty: 42 TABLET | Refills: 0 | Status: SHIPPED | OUTPATIENT
Start: 2018-04-02 | End: 2018-05-14 | Stop reason: SDUPTHER

## 2018-04-02 RX ORDER — AMITRIPTYLINE HYDROCHLORIDE 10 MG/1
10-20 TABLET, FILM COATED ORAL NIGHTLY
Qty: 60 TABLET | Refills: 1 | Status: SHIPPED | OUTPATIENT
Start: 2018-04-02 | End: 2018-05-14 | Stop reason: ALTCHOICE

## 2018-04-02 RX ORDER — NAPROXEN 500 MG/1
500 TABLET ORAL 2 TIMES DAILY WITH MEALS
Qty: 60 TABLET | Refills: 1 | Status: SHIPPED | OUTPATIENT
Start: 2018-04-02 | End: 2018-05-14 | Stop reason: SDUPTHER

## 2018-04-09 DIAGNOSIS — R11.0 NAUSEA: ICD-10-CM

## 2018-04-09 RX ORDER — PROMETHAZINE HYDROCHLORIDE 25 MG/1
TABLET ORAL
Qty: 45 TABLET | Refills: 0 | Status: SHIPPED | OUTPATIENT
Start: 2018-04-09 | End: 2018-04-27 | Stop reason: SDUPTHER

## 2018-04-27 DIAGNOSIS — R11.0 NAUSEA: ICD-10-CM

## 2018-04-27 RX ORDER — PROMETHAZINE HYDROCHLORIDE 25 MG/1
TABLET ORAL
Qty: 45 TABLET | Refills: 0 | Status: SHIPPED | OUTPATIENT
Start: 2018-04-27 | End: 2018-05-14 | Stop reason: SDUPTHER

## 2018-05-14 ENCOUNTER — OFFICE VISIT (OUTPATIENT)
Dept: PAIN MANAGEMENT | Age: 33
End: 2018-05-14

## 2018-05-14 VITALS
WEIGHT: 138 LBS | SYSTOLIC BLOOD PRESSURE: 113 MMHG | HEART RATE: 82 BPM | DIASTOLIC BLOOD PRESSURE: 72 MMHG | BODY MASS INDEX: 23.69 KG/M2

## 2018-05-14 DIAGNOSIS — G89.4 CHRONIC PAIN SYNDROME: ICD-10-CM

## 2018-05-14 DIAGNOSIS — R11.0 NAUSEA: ICD-10-CM

## 2018-05-14 DIAGNOSIS — N80.9 ENDOMETRIOSIS: ICD-10-CM

## 2018-05-14 DIAGNOSIS — M79.7 FIBROMYALGIA: ICD-10-CM

## 2018-05-14 DIAGNOSIS — N94.89 PELVIC PAIN SYNDROME: ICD-10-CM

## 2018-05-14 PROCEDURE — 99213 OFFICE O/P EST LOW 20 MIN: CPT | Performed by: INTERNAL MEDICINE

## 2018-05-14 RX ORDER — NORTRIPTYLINE HYDROCHLORIDE 25 MG/1
25-50 CAPSULE ORAL NIGHTLY
Qty: 60 CAPSULE | Refills: 0 | Status: SHIPPED | OUTPATIENT
Start: 2018-05-14 | End: 2018-06-25 | Stop reason: ALTCHOICE

## 2018-05-14 RX ORDER — GABAPENTIN 400 MG/1
CAPSULE ORAL
Qty: 120 CAPSULE | Refills: 1 | Status: SHIPPED | OUTPATIENT
Start: 2018-05-14 | End: 2018-06-25 | Stop reason: SDUPTHER

## 2018-05-14 RX ORDER — PROMETHAZINE HYDROCHLORIDE 25 MG/1
TABLET ORAL
Qty: 45 TABLET | Refills: 0 | Status: SHIPPED | OUTPATIENT
Start: 2018-05-14 | End: 2018-05-31 | Stop reason: SDUPTHER

## 2018-05-14 RX ORDER — OXYCODONE HYDROCHLORIDE AND ACETAMINOPHEN 5; 325 MG/1; MG/1
TABLET ORAL
Qty: 42 TABLET | Refills: 0 | Status: SHIPPED | OUTPATIENT
Start: 2018-05-14 | End: 2018-06-25 | Stop reason: SDUPTHER

## 2018-05-14 RX ORDER — NAPROXEN 500 MG/1
500 TABLET ORAL 2 TIMES DAILY WITH MEALS
Qty: 60 TABLET | Refills: 1 | Status: SHIPPED | OUTPATIENT
Start: 2018-05-14 | End: 2018-06-25 | Stop reason: SDUPTHER

## 2018-05-31 DIAGNOSIS — R11.0 NAUSEA: ICD-10-CM

## 2018-05-31 RX ORDER — PROMETHAZINE HYDROCHLORIDE 25 MG/1
TABLET ORAL
Qty: 45 TABLET | Refills: 0 | Status: SHIPPED | OUTPATIENT
Start: 2018-05-31 | End: 2018-06-15 | Stop reason: SDUPTHER

## 2018-06-15 DIAGNOSIS — R11.0 NAUSEA: ICD-10-CM

## 2018-06-15 RX ORDER — PROMETHAZINE HYDROCHLORIDE 25 MG/1
TABLET ORAL
Qty: 45 TABLET | Refills: 0 | Status: SHIPPED | OUTPATIENT
Start: 2018-06-15 | End: 2018-07-02 | Stop reason: SDUPTHER

## 2018-06-25 ENCOUNTER — OFFICE VISIT (OUTPATIENT)
Dept: PAIN MANAGEMENT | Age: 33
End: 2018-06-25

## 2018-06-25 VITALS — DIASTOLIC BLOOD PRESSURE: 70 MMHG | HEART RATE: 75 BPM | SYSTOLIC BLOOD PRESSURE: 104 MMHG

## 2018-06-25 DIAGNOSIS — N80.9 ENDOMETRIOSIS: ICD-10-CM

## 2018-06-25 DIAGNOSIS — M79.7 FIBROMYALGIA: ICD-10-CM

## 2018-06-25 DIAGNOSIS — N94.89 PELVIC PAIN SYNDROME: ICD-10-CM

## 2018-06-25 DIAGNOSIS — G89.4 CHRONIC PAIN SYNDROME: ICD-10-CM

## 2018-06-25 PROCEDURE — 99213 OFFICE O/P EST LOW 20 MIN: CPT | Performed by: INTERNAL MEDICINE

## 2018-06-25 RX ORDER — OXYCODONE HYDROCHLORIDE AND ACETAMINOPHEN 5; 325 MG/1; MG/1
TABLET ORAL
Qty: 42 TABLET | Refills: 0 | Status: SHIPPED | OUTPATIENT
Start: 2018-06-25 | End: 2018-07-30 | Stop reason: SDUPTHER

## 2018-06-25 RX ORDER — GABAPENTIN 400 MG/1
CAPSULE ORAL
Qty: 120 CAPSULE | Refills: 1 | Status: SHIPPED | OUTPATIENT
Start: 2018-06-25 | End: 2018-07-30 | Stop reason: SDUPTHER

## 2018-06-25 RX ORDER — AMITRIPTYLINE HYDROCHLORIDE 25 MG/1
25-50 TABLET, FILM COATED ORAL NIGHTLY
Qty: 60 TABLET | Refills: 0 | Status: SHIPPED | OUTPATIENT
Start: 2018-06-25 | End: 2018-07-30

## 2018-06-25 RX ORDER — NORTRIPTYLINE HYDROCHLORIDE 25 MG/1
25-50 CAPSULE ORAL NIGHTLY
Qty: 60 CAPSULE | Refills: 0 | Status: SHIPPED | OUTPATIENT
Start: 2018-06-25 | End: 2018-07-30 | Stop reason: SDUPTHER

## 2018-06-25 RX ORDER — NAPROXEN 500 MG/1
500 TABLET ORAL 2 TIMES DAILY WITH MEALS
Qty: 60 TABLET | Refills: 1 | Status: SHIPPED | OUTPATIENT
Start: 2018-06-25 | End: 2018-07-30 | Stop reason: SDUPTHER

## 2018-07-02 DIAGNOSIS — R11.0 NAUSEA: ICD-10-CM

## 2018-07-02 RX ORDER — PROMETHAZINE HYDROCHLORIDE 25 MG/1
TABLET ORAL
Qty: 45 TABLET | Refills: 0 | Status: SHIPPED | OUTPATIENT
Start: 2018-07-02 | End: 2018-07-18 | Stop reason: SDUPTHER

## 2018-07-18 DIAGNOSIS — R11.0 NAUSEA: ICD-10-CM

## 2018-07-18 RX ORDER — PROMETHAZINE HYDROCHLORIDE 25 MG/1
TABLET ORAL
Qty: 45 TABLET | Refills: 0 | Status: SHIPPED | OUTPATIENT
Start: 2018-07-18 | End: 2018-08-01 | Stop reason: SDUPTHER

## 2018-07-30 ENCOUNTER — OFFICE VISIT (OUTPATIENT)
Dept: PAIN MANAGEMENT | Age: 33
End: 2018-07-30

## 2018-07-30 VITALS
WEIGHT: 138 LBS | BODY MASS INDEX: 23.69 KG/M2 | HEART RATE: 87 BPM | SYSTOLIC BLOOD PRESSURE: 107 MMHG | DIASTOLIC BLOOD PRESSURE: 76 MMHG

## 2018-07-30 DIAGNOSIS — N80.9 ENDOMETRIOSIS: ICD-10-CM

## 2018-07-30 DIAGNOSIS — N94.89 PELVIC PAIN SYNDROME: ICD-10-CM

## 2018-07-30 DIAGNOSIS — G89.4 CHRONIC PAIN SYNDROME: ICD-10-CM

## 2018-07-30 DIAGNOSIS — F51.01 PRIMARY INSOMNIA: ICD-10-CM

## 2018-07-30 DIAGNOSIS — M79.7 FIBROMYALGIA: ICD-10-CM

## 2018-07-30 PROCEDURE — 99213 OFFICE O/P EST LOW 20 MIN: CPT | Performed by: INTERNAL MEDICINE

## 2018-07-30 RX ORDER — GABAPENTIN 400 MG/1
CAPSULE ORAL
Qty: 120 CAPSULE | Refills: 1 | Status: SHIPPED | OUTPATIENT
Start: 2018-07-30 | End: 2018-09-10 | Stop reason: SDUPTHER

## 2018-07-30 RX ORDER — ERYTHROMYCIN AND BENZOYL PEROXIDE 30; 50 MG/G; MG/G
GEL TOPICAL
Qty: 60 G | Refills: 0 | Status: SHIPPED | OUTPATIENT
Start: 2018-07-30 | End: 2018-08-29

## 2018-07-30 RX ORDER — NAPROXEN 500 MG/1
500 TABLET ORAL 2 TIMES DAILY WITH MEALS
Qty: 60 TABLET | Refills: 1 | Status: SHIPPED | OUTPATIENT
Start: 2018-07-30 | End: 2018-09-10 | Stop reason: SDUPTHER

## 2018-07-30 RX ORDER — NORTRIPTYLINE HYDROCHLORIDE 25 MG/1
25-50 CAPSULE ORAL NIGHTLY
Qty: 60 CAPSULE | Refills: 0 | Status: SHIPPED | OUTPATIENT
Start: 2018-07-30 | End: 2018-09-10 | Stop reason: SDUPTHER

## 2018-07-30 RX ORDER — OXYCODONE HYDROCHLORIDE AND ACETAMINOPHEN 5; 325 MG/1; MG/1
TABLET ORAL
Qty: 42 TABLET | Refills: 0 | Status: SHIPPED | OUTPATIENT
Start: 2018-07-30 | End: 2018-09-10 | Stop reason: SDUPTHER

## 2018-07-30 NOTE — PROGRESS NOTES
Amalia Nap Jilg  1985  [id number]    HISTORY OF PRESENT ILLNESS:  Ms. Juancarlos Barroso is a 35 y.o. female returns for a follow up visit for multiple medical problems. Her current presenting problems are   1. Chronic pain syndrome    2. Fibromyalgia    3. Recurrent major depressive disorder, in partial remission ([facility] [address]    4. Primary insomnia    . As per information/history obtained from the PADT(patient assessment and documentation tool) - She complains of pain in the neck, shoulders Bilateral, elbows Bilateral, upper back, mid back, lower back and knees Bilateral,abdomen with radiation to the arms Bilateral, buttocks and hips Bilateral She rates the pain 7/10 and describes it as aching, throbbing. Pain is made worse by: sitting, bending, lifting. Current treatment regimen has helped relieve about 30% of the pain. She denies side effects from the current pain regimen. Patient reports that since the last follow up visit the physical functioning is unchanged, family/social relationships are better, mood is unchanged and sleep patterns are worse, and that the overall functioning is unchanged. Patient denies neurological bowel or bladder. Patient denies misusing/abusing her narcotic pain medications or using any illegal drugs. There are No indicators for possible drug abuse, addiction or diversion problems. Upon obtaining the medical history from Ms. Juancarlos Barroso regarding today's office visit for her presenting problems, patient states she has been doing fair. Ms. Juancarlos Barroso states her sleep is better, she says she is using Pamelor. She says her pelvic pain is tolerable but still gets cramping off and on. She mentions she is using Percocet average 1 per day. She reports she is working part time still. ALLERGIES: Patients list of allergies were reviewed     MEDICATIONS: Ms. Juancarlos Barroso list of medications were reviewed. Her current medications are   Outpatient Medications Prior to Visit   Medication Sig warm and dry, good turgor. No rash noted. She is not diaphoretic. Cardiovascular: Normal rate, regular rhythm, normal heart sounds, and does not have murmur. Pulmonary/Chest: Effort normal. No respiratory distress. She does not have wheezes in the lung fields. She has no rales. Neurological/Psychiatric:She is alert and oriented to person, place, and time. Coordination is  normal. Her mood isAppropriate and affect is Flat/blunted and Anxious . Other: + Acne lesion on her face  IMPRESSION:   1. Chronic pain syndrome    2. Fibromyalgia    3. Primary insomnia    4. Pelvic pain syndrome    5. Endometriosis        PLAN:  Informed verbal consent was obtained  -Continue with current regimen  -ROM/Stretching exercises as advised  -continue with Percocet 1 per day  -she was advised proper sleep hygiene-told to avoid:use of caffeine or other stimulants after noon, alcohol use near bedtime, long or frequent naps during the day, erratic sleep schedule, heavy meals near bedtime, vigorous exercise near bedtime and use of electronic devices near bedtime, continue with Pamelor  -start Benzamycin GEL to affected area of the face BID  -Adv Biofeedback, relaxation and meditation techniques. Referral to psychologist for CBT was also discussed with patient     Current Outpatient Prescriptions   Medication Sig Dispense Refill    promethazine (PHENERGAN) 25 MG tablet TAKE 1 TABLET BY MOUTH EVERY 6 HOURS AS NEEDED FOR NAUSEA 45 tablet 0    oxyCODONE-acetaminophen (PERCOCET) 5-325 MG per tablet Take 1/2  tablet Q6H as needed for pain. Max 1-2 tablet per day.  42 tablet 0    naproxen (NAPROSYN) 500 MG tablet Take 1 tablet by mouth 2 times daily (with meals) 60 tablet 1    gabapentin (NEURONTIN) 400 MG capsule Take 1 tab in the am, 1 tab in the afternoon and 2 tabs in the pm. 120 capsule 1    nortriptyline (PAMELOR) 25 MG capsule Take 1-2 capsules by mouth nightly 60 capsule 0    Cranberry-Milk Thistle 250-75 MG CAPS Take by mouth      Probiotic Product (PROBIOTIC DAILY PO) Take by mouth daily      lidocaine viscous-aluminum & magnesium hydroxide-simethicone-diphenhydrAMINE-nystatin Take 5 mLs by mouth every 4 hours 180 mL 0    calcium carbonate (OSCAL) 500 MG TABS tablet Take 500 mg by mouth daily.  calcium carbonate (TUMS) 500 MG chewable tablet Take 1 tablet by mouth daily.  Magnesium Oxide (MAG-200 PO) Take 1 tablet by mouth daily.  Multiple Vitamin (MULTI-VITAMIN DAILY PO) Take  by mouth.  Omega-3 Fatty Acids (FISH OIL) 1000 MG CAPS Take 1,000 mg by mouth daily.  amitriptyline (ELAVIL) 25 MG tablet Take 1-2 tablets by mouth nightly 60 tablet 0     No current facility-administered medications for this visit. I will continue her current medication regimen  which is part of the above treatment schedule. It has been helping with Ms. Jake May chronic  medical problems which for this visit include:   Diagnoses of Chronic pain syndrome, Fibromyalgia, Recurrent major depressive disorder, in partial remission ([address] and Primary insomnia were pertinent to this visit. Risks and benefits of the medications and other alternative treatments  including no treatment were discussed with the patient. The common side effects of these medications were also explained to the patient. Informed verbal consent was obtained. Goals of current treatment regimen include improvement in pain, restoration of functioning- with focus on improvement in physical performance, general activity, work or disability,emotional distress, health care utilization and  decreased medication consumption. Will continue to monitor progress towards achieving/maintaining therapeutic goals with special emphasis on  1. Improvement in perceived interfernce  of pain with ADL's. Ability to do home exercises independently. Ability to do household chores indoor and/or outdoor work and social and leisure activities. Improve psychosocial and physical

## 2018-08-01 DIAGNOSIS — R11.0 NAUSEA: ICD-10-CM

## 2018-08-01 RX ORDER — PROMETHAZINE HYDROCHLORIDE 25 MG/1
TABLET ORAL
Qty: 45 TABLET | Refills: 0 | Status: SHIPPED | OUTPATIENT
Start: 2018-08-01 | End: 2018-08-16 | Stop reason: SDUPTHER

## 2018-08-16 DIAGNOSIS — R11.0 NAUSEA: ICD-10-CM

## 2018-08-16 RX ORDER — PROMETHAZINE HYDROCHLORIDE 25 MG/1
TABLET ORAL
Qty: 45 TABLET | Refills: 0 | Status: SHIPPED | OUTPATIENT
Start: 2018-08-16 | End: 2018-08-31 | Stop reason: SDUPTHER

## 2018-09-10 ENCOUNTER — OFFICE VISIT (OUTPATIENT)
Dept: PAIN MANAGEMENT | Age: 33
End: 2018-09-10

## 2018-09-10 VITALS
DIASTOLIC BLOOD PRESSURE: 67 MMHG | SYSTOLIC BLOOD PRESSURE: 110 MMHG | BODY MASS INDEX: 23.69 KG/M2 | HEART RATE: 79 BPM | WEIGHT: 138 LBS

## 2018-09-10 DIAGNOSIS — N94.89 PELVIC PAIN SYNDROME: ICD-10-CM

## 2018-09-10 DIAGNOSIS — M79.7 FIBROMYALGIA: ICD-10-CM

## 2018-09-10 DIAGNOSIS — N80.9 ENDOMETRIOSIS: ICD-10-CM

## 2018-09-10 DIAGNOSIS — G89.4 CHRONIC PAIN SYNDROME: ICD-10-CM

## 2018-09-10 PROCEDURE — 99213 OFFICE O/P EST LOW 20 MIN: CPT | Performed by: INTERNAL MEDICINE

## 2018-09-10 RX ORDER — NORTRIPTYLINE HYDROCHLORIDE 25 MG/1
25-50 CAPSULE ORAL NIGHTLY
Qty: 60 CAPSULE | Refills: 0 | Status: SHIPPED | OUTPATIENT
Start: 2018-09-10 | End: 2018-11-05 | Stop reason: SDUPTHER

## 2018-09-10 RX ORDER — GABAPENTIN 400 MG/1
CAPSULE ORAL
Qty: 120 CAPSULE | Refills: 1 | Status: SHIPPED | OUTPATIENT
Start: 2018-09-10 | End: 2018-11-05 | Stop reason: SDUPTHER

## 2018-09-10 RX ORDER — OXYCODONE HYDROCHLORIDE AND ACETAMINOPHEN 5; 325 MG/1; MG/1
0.5 TABLET ORAL EVERY 6 HOURS PRN
Qty: 42 TABLET | Refills: 0 | Status: SHIPPED | OUTPATIENT
Start: 2018-09-10 | End: 2018-11-05 | Stop reason: SDUPTHER

## 2018-09-10 RX ORDER — NAPROXEN 500 MG/1
500 TABLET ORAL 2 TIMES DAILY WITH MEALS
Qty: 60 TABLET | Refills: 1 | Status: SHIPPED | OUTPATIENT
Start: 2018-09-10 | End: 2018-11-05 | Stop reason: SDUPTHER

## 2018-09-10 NOTE — PROGRESS NOTES
Ede Brooks Jilg  1985  [id number]    HISTORY OF PRESENT ILLNESS:  Ms. Terri Navarro is a 35 y.o. female returns for a follow up visit for multiple medical problems. Her current presenting problems are   1. Chronic pain syndrome    2. Recurrent major depressive disorder, in partial remission ([facility] [address]    3. Fibromyalgia    4. Primary insomnia    . As per information/history obtained from the PADT(patient assessment and documentation tool) - She complains of pain in the neck, shoulders Bilateral, upper back, mid back, lower back and knees Bilateral with radiation to the arms Bilateral, hands Bilateral and buttocks She rates the pain 8/10 and describes it as aching, burning, throbbing. Pain is made worse by: sitting, lifting. Current treatment regimen has helped relieve about 30% of the pain. She denies side effects from the current pain regimen. Patient reports that since the last follow up visit the physical functioning is worse, family/social relationships are unchanged, mood is worse and sleep patterns are unchanged, and that the overall functioning is worse. Patient denies neurological bowel or bladder. Patient denies misusing/abusing her narcotic pain medications or using any illegal drugs. There are No indicators for possible drug abuse, addiction or diversion problems. Upon obtaining the medical history from Ms. Terri Navarro regarding today's office visit for her presenting problems, patient states her pain was a little worse but not intolerable. Ms. Terri Navarro states she is still working full time. Patient states she sleeps well. Has normal sleep latency. Averages about 5-7 hours of sleep a night. Denies any signs of sleep apnea. Feels rested in the AM. Denies any sleep attacks during the day. Medication regimen helps with maintaining/regulating sleep. She states she is using Naprosyn PRN, denies any GERD symptoms.      ALLERGIES: Patients list of allergies were reviewed     MEDICATIONS: Ms. Benavides lucia Geovanni Contreras list of medications were reviewed. Her current medications are   Outpatient Medications Prior to Visit   Medication Sig Dispense Refill    promethazine (PHENERGAN) 25 MG tablet TAKE 1 TABLET BY MOUTH EVERY 6 HOURS AS NEEDED FOR NAUSEA 45 tablet 0    promethazine (PHENERGAN) 25 MG tablet TAKE 1 TABLET BY MOUTH EVERY 6 HOURS AS NEEDED FOR NAUSEA 45 tablet 0    oxyCODONE-acetaminophen (PERCOCET) 5-325 MG per tablet Take 1/2  tablet Q6H as needed for pain. Max 1-2 tablet per day. 42 tablet 0    naproxen (NAPROSYN) 500 MG tablet Take 1 tablet by mouth 2 times daily (with meals) 60 tablet 1    gabapentin (NEURONTIN) 400 MG capsule Take 1 tab in the am, 1 tab in the afternoon and 2 tabs in the pm. 120 capsule 1    nortriptyline (PAMELOR) 25 MG capsule Take 1-2 capsules by mouth nightly 60 capsule 0    Cranberry-Milk Thistle 250-75 MG CAPS Take by mouth      Probiotic Product (PROBIOTIC DAILY PO) Take by mouth daily      lidocaine viscous-aluminum & magnesium hydroxide-simethicone-diphenhydrAMINE-nystatin Take 5 mLs by mouth every 4 hours 180 mL 0    calcium carbonate (OSCAL) 500 MG TABS tablet Take 500 mg by mouth daily.  calcium carbonate (TUMS) 500 MG chewable tablet Take 1 tablet by mouth daily.  Magnesium Oxide (MAG-200 PO) Take 1 tablet by mouth daily.  Multiple Vitamin (MULTI-VITAMIN DAILY PO) Take  by mouth.  Omega-3 Fatty Acids (FISH OIL) 1000 MG CAPS Take 1,000 mg by mouth daily. No facility-administered medications prior to visit. SOCIAL/FAMILY/PAST MEDICAL HISTORY: Ms. Denis Acosta, family and past medical history was reviewed. REVIEW OF SYSTEMS:    Respiratory: Negative for apnea, chest tightness and shortness of breath or change in baseline breathing. Gastrointestinal: Negative for nausea, vomiting, abdominal pain, diarrhea, constipation, blood in stool and abdominal distention. PHYSICAL EXAM:   Nursing note and vitals reviewed.  /67 Pulse 79   Wt 138 lb (62.6 kg)   BMI 23.69 kg/m²   Constitutional: She appears well-developed and well-nourished. No acute distress. Skin: Skin is warm and dry, good turgor. No rash noted. She is not diaphoretic. Cardiovascular: Normal rate, regular rhythm, normal heart sounds, and does not have murmur. Pulmonary/Chest: Effort normal. No respiratory distress. She does not have wheezes in the lung fields. She has no rales. Neurological/Psychiatric:She is alert and oriented to person, place, and time. Coordination is  normal. Her mood isAppropriate and affect is Neutral/Euthymic(normal) . IMPRESSION:   1. Chronic pain syndrome    2. Fibromyalgia    3. Endometriosis    4. Pelvic pain syndrome        PLAN:  Informed verbal consent was obtained  -[place] record was obtained and reviewed  for the last one year and no indicators of drug misuse  were found. Any other controlled substance prescriptions  seen on the record have been accounted for, I am aware of the patient receiving these medications. Carlos Counter [place] record will be rechecked as part of office protocol.   -Continue with current regimen  -Urine drug screen with GC/MS for opiates and drugs of abuse was ordered and will follow up on results. -Walking/stretching exercises as advised  -she was advised proper sleep hygiene-told to avoid:use of caffeine or other stimulants after noon, alcohol use near bedtime, long or frequent naps during the day, erratic sleep schedule, heavy meals near bedtime, vigorous exercise near bedtime and use of electronic devices near bedtime     Current Outpatient Prescriptions   Medication Sig Dispense Refill    promethazine (PHENERGAN) 25 MG tablet TAKE 1 TABLET BY MOUTH EVERY 6 HOURS AS NEEDED FOR NAUSEA 45 tablet 0    promethazine (PHENERGAN) 25 MG tablet TAKE 1 TABLET BY MOUTH EVERY 6 HOURS AS NEEDED FOR NAUSEA 45 tablet 0    oxyCODONE-acetaminophen (PERCOCET) 5-325 MG per tablet Take 1/2  tablet Q6H as needed for pain.  Max

## 2018-09-14 DIAGNOSIS — R11.0 NAUSEA: ICD-10-CM

## 2018-09-14 RX ORDER — PROMETHAZINE HYDROCHLORIDE 25 MG/1
TABLET ORAL
Qty: 45 TABLET | Refills: 0 | OUTPATIENT
Start: 2018-09-14

## 2018-09-18 DIAGNOSIS — R11.0 NAUSEA: ICD-10-CM

## 2018-09-19 RX ORDER — PROMETHAZINE HYDROCHLORIDE 25 MG/1
TABLET ORAL
Qty: 45 TABLET | Refills: 0 | Status: SHIPPED | OUTPATIENT
Start: 2018-09-19 | End: 2018-10-10

## 2018-09-19 NOTE — TELEPHONE ENCOUNTER
Left message on patient's cell that this is the last refill for her, she must be seen before anymore can be given. Also called the pharmacy and asked them to give her the message for when she picks up the medication.

## 2018-10-10 DIAGNOSIS — R11.0 NAUSEA: ICD-10-CM

## 2018-10-10 RX ORDER — PROMETHAZINE HYDROCHLORIDE 25 MG/1
TABLET ORAL
Qty: 16 TABLET | Refills: 0 | Status: SHIPPED | OUTPATIENT
Start: 2018-10-10 | End: 2018-11-05 | Stop reason: ALTCHOICE

## 2018-10-18 ENCOUNTER — OFFICE VISIT (OUTPATIENT)
Dept: FAMILY MEDICINE CLINIC | Age: 33
End: 2018-10-18

## 2018-10-18 VITALS
SYSTOLIC BLOOD PRESSURE: 104 MMHG | DIASTOLIC BLOOD PRESSURE: 56 MMHG | WEIGHT: 139 LBS | HEIGHT: 64 IN | BODY MASS INDEX: 23.73 KG/M2 | OXYGEN SATURATION: 98 % | TEMPERATURE: 98.6 F | HEART RATE: 100 BPM

## 2018-10-18 DIAGNOSIS — L70.9 ACNE, UNSPECIFIED ACNE TYPE: ICD-10-CM

## 2018-10-18 DIAGNOSIS — R11.0 NAUSEA: ICD-10-CM

## 2018-10-18 DIAGNOSIS — N80.9 ENDOMETRIOSIS: ICD-10-CM

## 2018-10-18 DIAGNOSIS — Z00.00 PHYSICAL EXAM: Primary | ICD-10-CM

## 2018-10-18 LAB
A/G RATIO: 1.7 (ref 1.1–2.2)
ALBUMIN SERPL-MCNC: 4 G/DL (ref 3.4–5)
ALP BLD-CCNC: 53 U/L (ref 40–129)
ALT SERPL-CCNC: 13 U/L (ref 10–40)
ANION GAP SERPL CALCULATED.3IONS-SCNC: 17 MMOL/L (ref 3–16)
AST SERPL-CCNC: 23 U/L (ref 15–37)
BASOPHILS ABSOLUTE: 0 K/UL (ref 0–0.2)
BASOPHILS RELATIVE PERCENT: 0.6 %
BILIRUB SERPL-MCNC: <0.2 MG/DL (ref 0–1)
BUN BLDV-MCNC: 6 MG/DL (ref 7–20)
CALCIUM SERPL-MCNC: 9.3 MG/DL (ref 8.3–10.6)
CHLORIDE BLD-SCNC: 105 MMOL/L (ref 99–110)
CHOLESTEROL, TOTAL: 187 MG/DL (ref 0–199)
CO2: 22 MMOL/L (ref 21–32)
CREAT SERPL-MCNC: <0.5 MG/DL (ref 0.6–1.1)
EOSINOPHILS ABSOLUTE: 0.5 K/UL (ref 0–0.6)
EOSINOPHILS RELATIVE PERCENT: 7.6 %
FERRITIN: 4.7 NG/ML (ref 15–150)
GFR AFRICAN AMERICAN: >60
GFR NON-AFRICAN AMERICAN: >60
GLOBULIN: 2.4 G/DL
GLUCOSE BLD-MCNC: 81 MG/DL (ref 70–99)
HCT VFR BLD CALC: 33.3 % (ref 36–48)
HDLC SERPL-MCNC: 63 MG/DL (ref 40–60)
HEMOGLOBIN: 9.9 G/DL (ref 12–16)
IRON SATURATION: 7 % (ref 15–50)
IRON: 32 UG/DL (ref 37–145)
LDL CHOLESTEROL CALCULATED: 97 MG/DL
LYMPHOCYTES ABSOLUTE: 2.2 K/UL (ref 1–5.1)
LYMPHOCYTES RELATIVE PERCENT: 31 %
MCH RBC QN AUTO: 21.9 PG (ref 26–34)
MCHC RBC AUTO-ENTMCNC: 29.7 G/DL (ref 31–36)
MCV RBC AUTO: 73.7 FL (ref 80–100)
MONOCYTES ABSOLUTE: 0.4 K/UL (ref 0–1.3)
MONOCYTES RELATIVE PERCENT: 5.5 %
NEUTROPHILS ABSOLUTE: 3.9 K/UL (ref 1.7–7.7)
NEUTROPHILS RELATIVE PERCENT: 55.3 %
PDW BLD-RTO: 16.8 % (ref 12.4–15.4)
PLATELET # BLD: 535 K/UL (ref 135–450)
PMV BLD AUTO: 8.1 FL (ref 5–10.5)
POTASSIUM SERPL-SCNC: 4.6 MMOL/L (ref 3.5–5.1)
RBC # BLD: 4.52 M/UL (ref 4–5.2)
SODIUM BLD-SCNC: 144 MMOL/L (ref 136–145)
TOTAL IRON BINDING CAPACITY: 450 UG/DL (ref 260–445)
TOTAL PROTEIN: 6.4 G/DL (ref 6.4–8.2)
TRIGL SERPL-MCNC: 134 MG/DL (ref 0–150)
TSH REFLEX FT4: 2.03 UIU/ML (ref 0.27–4.2)
VITAMIN D 25-HYDROXY: 34.2 NG/ML
VLDLC SERPL CALC-MCNC: 27 MG/DL
WBC # BLD: 7 K/UL (ref 4–11)

## 2018-10-18 PROCEDURE — 99395 PREV VISIT EST AGE 18-39: CPT | Performed by: NURSE PRACTITIONER

## 2018-10-18 PROCEDURE — 36415 COLL VENOUS BLD VENIPUNCTURE: CPT | Performed by: NURSE PRACTITIONER

## 2018-10-18 RX ORDER — MINOCYCLINE HYDROCHLORIDE 50 MG/1
50 CAPSULE ORAL 2 TIMES DAILY
Qty: 60 CAPSULE | Refills: 3 | Status: SHIPPED | OUTPATIENT
Start: 2018-10-18 | End: 2019-12-19

## 2018-10-18 RX ORDER — ONDANSETRON 4 MG/1
4 TABLET, ORALLY DISINTEGRATING ORAL EVERY 8 HOURS PRN
Qty: 90 TABLET | Refills: 0 | Status: SHIPPED | OUTPATIENT
Start: 2018-10-18 | End: 2018-12-05 | Stop reason: SDUPTHER

## 2018-10-18 ASSESSMENT — ENCOUNTER SYMPTOMS
ABDOMINAL PAIN: 1
COUGH: 0
SHORTNESS OF BREATH: 0
CONSTIPATION: 0
CHEST TIGHTNESS: 0
SORE THROAT: 0
DIARRHEA: 0

## 2018-10-18 NOTE — PROGRESS NOTES
Patient ID: Drucella Kehr is a 35 y.o. female who presents today for a Physical Exam.      LUTHER Sweet is a 36 yo female here for physical exam. Sees pain specialist, Dr. Charo Enriquez, for poss fibromyalgia and endometriosis. Moderately controlled on current meds. Was seen by rheumatology once, but not officially diagnosed with diease. Taking pain meds daily, 1/2 tab of percocet in am and 1/2 at hs. Naproxen daily    Has not seen gyn for 2 years for f/u endometriosis- [facility] provider, Dr Brooke Chery. Was told endometriosis is into bowel. Has not been seen by GI specialist.    Irregular periods- every other month to 2 twice per month, reg flow, however, endometriosis pain much worse with periods. Was told by gyn that BCP with estrogen endometriosis worse. Has taken progesterone BCP in past, but discontinued    Acne: worse with period. Has tried OTC- salicylic acid, Differin and Benozyl peroxide, helps minimally but never clears up, has been rx Clinda or Emycin but never clear up with this either, but better than OTC. Phenergan- taken for c/o of nausea related to pain meds. .    Past Medical History:   Diagnosis Date    Chronic myofascial pain     Dr. Charo Enriquez for pain control    Chronic pelvic pain in female     pelvic floor dysfunction    Depression     Endometriosis     hx of lupron/depo; surgery. hx of OCP trials.  Seizure disorder [facility])     neurology (while in [place]). off seizure meds since 2010, stable.          Past Surgical History:   Procedure Laterality Date    ABDOMEN SURGERY  2008, 2012    diagnostic laparascopy, scar tissue, endometriosis    LASIK      TYMPANOSTOMY TUBE PLACEMENT         Family History   Problem Relation Age of Onset    Adopted: Yes       Social History     Social History    Marital status: Single     Spouse name: N/A    Number of children: N/A    Years of education: N/A     Social History Main Topics    Smoking status: Former Smoker     Types: E-Cigarettes   

## 2018-10-18 NOTE — PROGRESS NOTES
Patient: Abby Calix is a 35 y.o. female who presents today with the following Chief Complaint(s):  Chief Complaint   Patient presents with    Annual Exam    Discuss Medications         HPI  Current Outpatient Prescriptions   Medication Sig Dispense Refill    promethazine (PHENERGAN) 25 MG tablet TAKE 1 TABLET BY MOUTH EVERY 6 HOURS AS NEEDED FOR NAUSEA 16 tablet 0    oxyCODONE-acetaminophen (PERCOCET) 5-325 MG per tablet Take 0.5 tablets by mouth every 6 hours as needed for Pain for up to 42 days. Max 1 per day. 42 tablet 0    naproxen (NAPROSYN) 500 MG tablet Take 1 tablet by mouth 2 times daily (with meals) 60 tablet 1    gabapentin (NEURONTIN) 400 MG capsule Take 1 tab in the am, 1 tab in the afternoon and 2 tabs in the pm. 120 capsule 1    nortriptyline (PAMELOR) 25 MG capsule Take 1-2 capsules by mouth nightly 60 capsule 0    Probiotic Product (PROBIOTIC DAILY PO) Take by mouth daily      calcium carbonate (OSCAL) 500 MG TABS tablet Take 500 mg by mouth daily.  calcium carbonate (TUMS) 500 MG chewable tablet Take 1 tablet by mouth daily.  Multiple Vitamin (MULTI-VITAMIN DAILY PO) Take  by mouth. No current facility-administered medications for this visit. Patient's past medical history, surgical history, family history, medications,  andallergies  were all reviewed and updated as appropriate today. Review of Systems      Physical Exam  Vitals:    10/18/18 1036   BP: (!) 104/56   Pulse: 100   Temp: 98.6 °F (37 °C)   SpO2: 98%       Assessment:  No diagnosis found. Plan:  There are no diagnoses linked to this encounter.

## 2018-10-19 LAB
ESTIMATED AVERAGE GLUCOSE: 108.3 MG/DL
HBA1C MFR BLD: 5.4 %

## 2018-11-05 ENCOUNTER — OFFICE VISIT (OUTPATIENT)
Dept: PAIN MANAGEMENT | Age: 33
End: 2018-11-05

## 2018-11-05 VITALS
DIASTOLIC BLOOD PRESSURE: 58 MMHG | BODY MASS INDEX: 23.17 KG/M2 | SYSTOLIC BLOOD PRESSURE: 113 MMHG | WEIGHT: 135 LBS | HEART RATE: 66 BPM

## 2018-11-05 DIAGNOSIS — G89.4 CHRONIC PAIN SYNDROME: ICD-10-CM

## 2018-11-05 DIAGNOSIS — M79.7 FIBROMYALGIA: ICD-10-CM

## 2018-11-05 DIAGNOSIS — N94.89 PELVIC PAIN SYNDROME: ICD-10-CM

## 2018-11-05 DIAGNOSIS — N80.9 ENDOMETRIOSIS: ICD-10-CM

## 2018-11-05 PROCEDURE — 99213 OFFICE O/P EST LOW 20 MIN: CPT | Performed by: INTERNAL MEDICINE

## 2018-11-05 RX ORDER — NORTRIPTYLINE HYDROCHLORIDE 25 MG/1
25-50 CAPSULE ORAL NIGHTLY
Qty: 60 CAPSULE | Refills: 0 | Status: SHIPPED | OUTPATIENT
Start: 2018-11-05 | End: 2018-12-17 | Stop reason: SDUPTHER

## 2018-11-05 RX ORDER — OXYCODONE HYDROCHLORIDE AND ACETAMINOPHEN 5; 325 MG/1; MG/1
0.5 TABLET ORAL EVERY 6 HOURS PRN
Qty: 42 TABLET | Refills: 0 | Status: SHIPPED | OUTPATIENT
Start: 2018-11-05 | End: 2018-12-17 | Stop reason: SDUPTHER

## 2018-11-05 RX ORDER — NAPROXEN 500 MG/1
500 TABLET ORAL 2 TIMES DAILY WITH MEALS
Qty: 60 TABLET | Refills: 1 | Status: SHIPPED | OUTPATIENT
Start: 2018-11-05 | End: 2018-12-17 | Stop reason: SDUPTHER

## 2018-11-05 RX ORDER — GABAPENTIN 400 MG/1
CAPSULE ORAL
Qty: 120 CAPSULE | Refills: 1 | Status: SHIPPED | OUTPATIENT
Start: 2018-11-05 | End: 2018-12-17 | Stop reason: SDUPTHER

## 2018-11-05 NOTE — PROGRESS NOTES
[facility]), and Primary insomnia were pertinent to this visit. Risks and benefits of the medications and other alternative treatments  including no treatment were discussed with the patient. The common side effects of these medications were also explained to the patient. Informed verbal consent was obtained. Goals of current treatment regimen include improvement in pain, restoration of functioning- with focus on improvement in physical performance, general activity, work or disability,emotional distress, health care utilization and  decreased medication consumption. Will continue to monitor progress towards achieving/maintaining therapeutic goals with special emphasis on  1. Improvement in perceived interfernce  of pain with ADL's. Ability to do home exercises independently. Ability to do household chores indoor and/or outdoor work and social and leisure activities. Improve psychosocial and physical functioning. - she is showing progression towards this treatment goal with the current regimen. She was advised against drinking alcohol with the narcotic pain medicines, advised against driving or handling machinery while adjusting the dose of medicines or if having cognitive  issues related to the current medications. Risk of overdose and death, if medicines not taken as prescribed, were also discussed. If the patient develops new symptoms or if the symptoms worsen, the patient should call the office. While transcribing every attempt was made to maintain the accuracy of the note in terms of it's contents,there may have been some errors made inadvertently. Thank you for allowing me to participate in the care of this patient.     Stephen Klein MD.    Cc: Daksha Hernadez MD

## 2018-12-05 DIAGNOSIS — R11.0 NAUSEA: ICD-10-CM

## 2018-12-05 RX ORDER — ONDANSETRON 4 MG/1
4 TABLET, ORALLY DISINTEGRATING ORAL EVERY 8 HOURS PRN
Qty: 90 TABLET | Refills: 2 | Status: SHIPPED | OUTPATIENT
Start: 2018-12-05 | End: 2020-01-30 | Stop reason: SDUPTHER

## 2018-12-17 ENCOUNTER — OFFICE VISIT (OUTPATIENT)
Dept: PAIN MANAGEMENT | Age: 33
End: 2018-12-17

## 2018-12-17 VITALS
BODY MASS INDEX: 23.17 KG/M2 | HEART RATE: 78 BPM | DIASTOLIC BLOOD PRESSURE: 63 MMHG | SYSTOLIC BLOOD PRESSURE: 100 MMHG | WEIGHT: 135 LBS

## 2018-12-17 DIAGNOSIS — G89.4 CHRONIC PAIN SYNDROME: ICD-10-CM

## 2018-12-17 DIAGNOSIS — M79.7 FIBROMYALGIA: ICD-10-CM

## 2018-12-17 DIAGNOSIS — N94.89 PELVIC PAIN SYNDROME: ICD-10-CM

## 2018-12-17 DIAGNOSIS — N80.9 ENDOMETRIOSIS: ICD-10-CM

## 2018-12-17 PROCEDURE — 99213 OFFICE O/P EST LOW 20 MIN: CPT | Performed by: INTERNAL MEDICINE

## 2018-12-17 RX ORDER — GABAPENTIN 400 MG/1
CAPSULE ORAL
Qty: 120 CAPSULE | Refills: 1 | Status: SHIPPED | OUTPATIENT
Start: 2018-12-17 | End: 2019-01-28 | Stop reason: SDUPTHER

## 2018-12-17 RX ORDER — NAPROXEN 500 MG/1
500 TABLET ORAL 2 TIMES DAILY WITH MEALS
Qty: 60 TABLET | Refills: 1 | Status: SHIPPED | OUTPATIENT
Start: 2018-12-17 | End: 2019-01-28 | Stop reason: SDUPTHER

## 2018-12-17 RX ORDER — TOBRAMYCIN 3 MG/ML
SOLUTION/ DROPS OPHTHALMIC
Qty: 1 BOTTLE | Refills: 0 | Status: SHIPPED | OUTPATIENT
Start: 2018-12-17 | End: 2019-03-11 | Stop reason: SDUPTHER

## 2018-12-17 RX ORDER — OXYCODONE HYDROCHLORIDE AND ACETAMINOPHEN 5; 325 MG/1; MG/1
0.5 TABLET ORAL EVERY 6 HOURS PRN
Qty: 42 TABLET | Refills: 0 | Status: SHIPPED | OUTPATIENT
Start: 2018-12-17 | End: 2019-01-28 | Stop reason: SDUPTHER

## 2018-12-17 RX ORDER — NORTRIPTYLINE HYDROCHLORIDE 25 MG/1
25-50 CAPSULE ORAL NIGHTLY
Qty: 60 CAPSULE | Refills: 0 | Status: SHIPPED | OUTPATIENT
Start: 2018-12-17 | End: 2019-01-28 | Stop reason: SDUPTHER

## 2018-12-17 NOTE — PROGRESS NOTES
Susy Sheffield  1985  [id number]      HISTORY OF PRESENT ILLNESS:  Ms. Terrell Chery is a 35 y.o. female returns for a follow up visit for pain management  She has a diagnosis of   1. Chronic pain syndrome    2. Fibromyalgia    3. Pelvic pain syndrome    4. Endometriosis    . She complains of pain all over her body. She rates the pain 7/10 and describes it as aching, throbbing. Current treatment regimen has helped relieve about 30% of the pain. She denies any side effects from the current pain regimen. Patient reports that since the last follow up visit the physical functioning is worse, family/social relationships are unchanged, mood is worse sleep patterns are worse, and that the overall functioning is worse. Patient denies misusing/abusing her narcotic pain medications or using any illegal drugs. There are No indicators for possible drug abuse, addiction or diversion problems. Patient states she has been doing fair, managing with the medications. Ms. Ja Shelley reports she is working part still. She says she has been using Percocet 1 per day for pain. Patient states her sleep is fair. Has fairly normal sleep latency. Averages about 4-6 hours of sleep a night. Denies any signs of sleep apnea. Feels somewhat rested in the morning. Patient is complaining of pink eye on the right side, watering and exudate in the AM.     ALLERGIES: Patients list of allergies were reviewed     MEDICATIONS: Ms. Terrell Chery list of medications were reviewed. Her current medications are   Outpatient Medications Prior to Visit   Medication Sig Dispense Refill    ondansetron (ZOFRAN-ODT) 4 MG disintegrating tablet Take 1 tablet by mouth every 8 hours as needed for Nausea 90 tablet 2    minocycline (MINOCIN;DYNACIN) 50 MG capsule Take 1 capsule by mouth 2 times daily 60 capsule 3    Probiotic Product (PROBIOTIC DAILY PO) Take by mouth daily      calcium carbonate (OSCAL) 500 MG TABS tablet Take 500 mg by mouth daily.      

## 2019-01-28 ENCOUNTER — OFFICE VISIT (OUTPATIENT)
Dept: PAIN MANAGEMENT | Age: 34
End: 2019-01-28

## 2019-01-28 VITALS
WEIGHT: 135 LBS | SYSTOLIC BLOOD PRESSURE: 100 MMHG | DIASTOLIC BLOOD PRESSURE: 65 MMHG | BODY MASS INDEX: 23.17 KG/M2 | HEART RATE: 75 BPM

## 2019-01-28 DIAGNOSIS — M79.7 FIBROMYALGIA: ICD-10-CM

## 2019-01-28 DIAGNOSIS — N80.9 ENDOMETRIOSIS: ICD-10-CM

## 2019-01-28 DIAGNOSIS — N94.89 PELVIC PAIN SYNDROME: ICD-10-CM

## 2019-01-28 DIAGNOSIS — G89.4 CHRONIC PAIN SYNDROME: ICD-10-CM

## 2019-01-28 PROCEDURE — 99213 OFFICE O/P EST LOW 20 MIN: CPT | Performed by: INTERNAL MEDICINE

## 2019-01-28 RX ORDER — NORTRIPTYLINE HYDROCHLORIDE 25 MG/1
25-50 CAPSULE ORAL NIGHTLY
Qty: 60 CAPSULE | Refills: 0 | Status: SHIPPED | OUTPATIENT
Start: 2019-01-28 | End: 2019-03-11 | Stop reason: SDUPTHER

## 2019-01-28 RX ORDER — OXYCODONE HYDROCHLORIDE AND ACETAMINOPHEN 5; 325 MG/1; MG/1
0.5 TABLET ORAL EVERY 6 HOURS PRN
Qty: 42 TABLET | Refills: 0 | Status: SHIPPED | OUTPATIENT
Start: 2019-01-28 | End: 2019-03-11 | Stop reason: SDUPTHER

## 2019-01-28 RX ORDER — GABAPENTIN 400 MG/1
CAPSULE ORAL
Qty: 120 CAPSULE | Refills: 1 | Status: SHIPPED | OUTPATIENT
Start: 2019-01-28 | End: 2019-03-11 | Stop reason: SDUPTHER

## 2019-01-28 RX ORDER — NAPROXEN 500 MG/1
500 TABLET ORAL 2 TIMES DAILY WITH MEALS
Qty: 60 TABLET | Refills: 1 | Status: SHIPPED | OUTPATIENT
Start: 2019-01-28 | End: 2019-03-11 | Stop reason: ALTCHOICE

## 2019-03-11 ENCOUNTER — OFFICE VISIT (OUTPATIENT)
Dept: PAIN MANAGEMENT | Age: 34
End: 2019-03-11

## 2019-03-11 VITALS
BODY MASS INDEX: 23.17 KG/M2 | HEART RATE: 71 BPM | DIASTOLIC BLOOD PRESSURE: 72 MMHG | SYSTOLIC BLOOD PRESSURE: 110 MMHG | WEIGHT: 135 LBS

## 2019-03-11 DIAGNOSIS — M79.7 FIBROMYALGIA: ICD-10-CM

## 2019-03-11 DIAGNOSIS — G89.29 CHRONIC PELVIC PAIN IN FEMALE: ICD-10-CM

## 2019-03-11 DIAGNOSIS — R10.2 CHRONIC PELVIC PAIN IN FEMALE: ICD-10-CM

## 2019-03-11 DIAGNOSIS — N80.9 ENDOMETRIOSIS: ICD-10-CM

## 2019-03-11 DIAGNOSIS — G89.4 CHRONIC PAIN SYNDROME: ICD-10-CM

## 2019-03-11 DIAGNOSIS — N94.89 PELVIC PAIN SYNDROME: ICD-10-CM

## 2019-03-11 PROCEDURE — 99213 OFFICE O/P EST LOW 20 MIN: CPT | Performed by: INTERNAL MEDICINE

## 2019-03-11 RX ORDER — OXYCODONE HYDROCHLORIDE AND ACETAMINOPHEN 5; 325 MG/1; MG/1
0.5 TABLET ORAL 2 TIMES DAILY PRN
Qty: 42 TABLET | Refills: 0 | Status: SHIPPED | OUTPATIENT
Start: 2019-03-11 | End: 2019-04-22 | Stop reason: SDUPTHER

## 2019-03-11 RX ORDER — NORTRIPTYLINE HYDROCHLORIDE 25 MG/1
25-50 CAPSULE ORAL NIGHTLY
Qty: 60 CAPSULE | Refills: 0 | Status: SHIPPED | OUTPATIENT
Start: 2019-03-11 | End: 2019-04-22 | Stop reason: SDUPTHER

## 2019-03-11 RX ORDER — GABAPENTIN 400 MG/1
CAPSULE ORAL
Qty: 120 CAPSULE | Refills: 1 | Status: SHIPPED | OUTPATIENT
Start: 2019-03-11 | End: 2019-04-22 | Stop reason: SDUPTHER

## 2019-03-11 RX ORDER — TOBRAMYCIN 3 MG/ML
SOLUTION/ DROPS OPHTHALMIC
Qty: 1 BOTTLE | Refills: 0 | Status: SHIPPED | OUTPATIENT
Start: 2019-03-11 | End: 2019-04-22 | Stop reason: SDUPTHER

## 2019-04-22 ENCOUNTER — OFFICE VISIT (OUTPATIENT)
Dept: PAIN MANAGEMENT | Age: 34
End: 2019-04-22

## 2019-04-22 VITALS
SYSTOLIC BLOOD PRESSURE: 114 MMHG | DIASTOLIC BLOOD PRESSURE: 68 MMHG | HEART RATE: 74 BPM | BODY MASS INDEX: 24.03 KG/M2 | WEIGHT: 140 LBS

## 2019-04-22 DIAGNOSIS — G89.29 CHRONIC PELVIC PAIN IN FEMALE: ICD-10-CM

## 2019-04-22 DIAGNOSIS — N80.9 ENDOMETRIOSIS: ICD-10-CM

## 2019-04-22 DIAGNOSIS — N94.89 PELVIC PAIN SYNDROME: ICD-10-CM

## 2019-04-22 DIAGNOSIS — M79.7 FIBROMYALGIA: ICD-10-CM

## 2019-04-22 DIAGNOSIS — R10.2 CHRONIC PELVIC PAIN IN FEMALE: ICD-10-CM

## 2019-04-22 DIAGNOSIS — G89.4 CHRONIC PAIN SYNDROME: ICD-10-CM

## 2019-04-22 PROCEDURE — 99213 OFFICE O/P EST LOW 20 MIN: CPT | Performed by: INTERNAL MEDICINE

## 2019-04-22 RX ORDER — GABAPENTIN 400 MG/1
CAPSULE ORAL
Qty: 120 CAPSULE | Refills: 1 | Status: SHIPPED | OUTPATIENT
Start: 2019-04-22 | End: 2019-06-03 | Stop reason: SDUPTHER

## 2019-04-22 RX ORDER — NORTRIPTYLINE HYDROCHLORIDE 25 MG/1
25-50 CAPSULE ORAL NIGHTLY
Qty: 60 CAPSULE | Refills: 0 | Status: SHIPPED | OUTPATIENT
Start: 2019-04-22 | End: 2019-06-03 | Stop reason: SDUPTHER

## 2019-04-22 RX ORDER — OXYCODONE HYDROCHLORIDE AND ACETAMINOPHEN 5; 325 MG/1; MG/1
0.5 TABLET ORAL 2 TIMES DAILY PRN
Qty: 42 TABLET | Refills: 0 | Status: SHIPPED | OUTPATIENT
Start: 2019-04-22 | End: 2019-06-03 | Stop reason: SDUPTHER

## 2019-04-22 RX ORDER — MELOXICAM 15 MG/1
TABLET ORAL
Qty: 30 TABLET | Refills: 0 | Status: SHIPPED | OUTPATIENT
Start: 2019-04-22 | End: 2019-06-03 | Stop reason: ALTCHOICE

## 2019-04-22 RX ORDER — TOBRAMYCIN 3 MG/ML
SOLUTION/ DROPS OPHTHALMIC
Qty: 1 BOTTLE | Refills: 0 | Status: SHIPPED | OUTPATIENT
Start: 2019-04-22 | End: 2019-07-15

## 2019-06-03 ENCOUNTER — OFFICE VISIT (OUTPATIENT)
Dept: PAIN MANAGEMENT | Age: 34
End: 2019-06-03

## 2019-06-03 VITALS
BODY MASS INDEX: 25.4 KG/M2 | SYSTOLIC BLOOD PRESSURE: 109 MMHG | WEIGHT: 148 LBS | DIASTOLIC BLOOD PRESSURE: 71 MMHG | HEART RATE: 80 BPM

## 2019-06-03 DIAGNOSIS — N94.89 PELVIC PAIN SYNDROME: ICD-10-CM

## 2019-06-03 DIAGNOSIS — M79.7 FIBROMYALGIA: ICD-10-CM

## 2019-06-03 DIAGNOSIS — N80.9 ENDOMETRIOSIS: ICD-10-CM

## 2019-06-03 DIAGNOSIS — G89.4 CHRONIC PAIN SYNDROME: ICD-10-CM

## 2019-06-03 PROCEDURE — 99213 OFFICE O/P EST LOW 20 MIN: CPT | Performed by: INTERNAL MEDICINE

## 2019-06-03 RX ORDER — GABAPENTIN 400 MG/1
CAPSULE ORAL
Qty: 120 CAPSULE | Refills: 1 | Status: SHIPPED | OUTPATIENT
Start: 2019-06-03 | End: 2019-07-15 | Stop reason: SDUPTHER

## 2019-06-03 RX ORDER — OXYCODONE HYDROCHLORIDE AND ACETAMINOPHEN 5; 325 MG/1; MG/1
0.5 TABLET ORAL 2 TIMES DAILY PRN
Qty: 42 TABLET | Refills: 0 | Status: SHIPPED | OUTPATIENT
Start: 2019-06-03 | End: 2019-07-15 | Stop reason: SDUPTHER

## 2019-06-03 RX ORDER — NORTRIPTYLINE HYDROCHLORIDE 25 MG/1
25-50 CAPSULE ORAL NIGHTLY
Qty: 60 CAPSULE | Refills: 0 | Status: SHIPPED | OUTPATIENT
Start: 2019-06-03 | End: 2019-07-15 | Stop reason: SDUPTHER

## 2019-06-03 NOTE — PROGRESS NOTES
Medications Prior to Visit   Medication Sig Dispense Refill    gabapentin (NEURONTIN) 400 MG capsule Take 1 tab in the am, 1 tab in the afternoon and 2 tabs in the pm 120 capsule 1    nortriptyline (PAMELOR) 25 MG capsule Take 1-2 capsules by mouth nightly 60 capsule 0    oxyCODONE-acetaminophen (PERCOCET) 5-325 MG per tablet Take 0.5 tablets by mouth 2 times daily as needed for Pain for up to 42 days. Max 1 per day 42 tablet 0    tobramycin (TOBREX) 0.3 % ophthalmic solution Place 2 drops into right eye TID 1 Bottle 0    meloxicam (MOBIC) 15 MG tablet Take 3 times/week, M, W, Fri 30 tablet 0    ondansetron (ZOFRAN-ODT) 4 MG disintegrating tablet Take 1 tablet by mouth every 8 hours as needed for Nausea 90 tablet 2    minocycline (MINOCIN;DYNACIN) 50 MG capsule Take 1 capsule by mouth 2 times daily 60 capsule 3    Probiotic Product (PROBIOTIC DAILY PO) Take by mouth daily      calcium carbonate (OSCAL) 500 MG TABS tablet Take 500 mg by mouth daily.  calcium carbonate (TUMS) 500 MG chewable tablet Take 1 tablet by mouth daily.  Multiple Vitamin (MULTI-VITAMIN DAILY PO) Take  by mouth. No facility-administered medications prior to visit. SOCIAL/FAMILY/PAST MEDICAL HISTORY: Ms. Nabeel Campbell, family and past medical history was reviewed. REVIEW OF SYSTEMS:    Respiratory: Negative for apnea, chest tightness and shortness of breath or change in baseline breathing. Gastrointestinal: Negative for nausea, vomiting, abdominal pain, diarrhea, constipation, blood in stool and abdominal distention. PHYSICAL EXAM:   Nursing note and vitals reviewed. /71   Pulse 80   Wt 148 lb (67.1 kg)   BMI 25.40 kg/m²   Constitutional: She appears well-developed and well-nourished. No acute distress. Skin: Skin is warm and dry, good turgor. No rash noted. She is not diaphoretic. Cardiovascular: Normal rate, regular rhythm, normal heart sounds, and does not have murmur. Pulmonary/Chest: Effort normal. No respiratory distress. She does not have wheezes in the lung fields. She has no rales. Neurological/Psychiatric:She is alert and oriented to person, place, and time. Coordination is  normal.  Her mood isAppropriate and affect is Neutral/Euthymic(normal) . IMPRESSION:   1. Chronic pain syndrome    2. Fibromyalgia    3. Pelvic pain syndrome    4. Endometriosis        PLAN:  Informed verbal consent was obtained  -continue with current opioid regimen   -Adv Biofeedback, relaxation and meditation techniques. Referral to psychologist for CBT was also discussed with patient   -continue with Percocet 1/2 tab po BID PRN  -will d/c Mobic  -Advised caffeine reduction, dietary changes, elevate head end of bed, NPO after supper, if using alcohol advised reduction of alcohol intake, tobacco cessation if smoking, weight loss    Current Outpatient Medications   Medication Sig Dispense Refill    gabapentin (NEURONTIN) 400 MG capsule Take 1 tab in the am, 1 tab in the afternoon and 2 tabs in the pm 120 capsule 1    nortriptyline (PAMELOR) 25 MG capsule Take 1-2 capsules by mouth nightly 60 capsule 0    oxyCODONE-acetaminophen (PERCOCET) 5-325 MG per tablet Take 0.5 tablets by mouth 2 times daily as needed for Pain for up to 42 days. Max 1 per day 42 tablet 0    tobramycin (TOBREX) 0.3 % ophthalmic solution Place 2 drops into right eye TID 1 Bottle 0    meloxicam (MOBIC) 15 MG tablet Take 3 times/week, M, W, Fri 30 tablet 0    ondansetron (ZOFRAN-ODT) 4 MG disintegrating tablet Take 1 tablet by mouth every 8 hours as needed for Nausea 90 tablet 2    minocycline (MINOCIN;DYNACIN) 50 MG capsule Take 1 capsule by mouth 2 times daily 60 capsule 3    Probiotic Product (PROBIOTIC DAILY PO) Take by mouth daily      calcium carbonate (OSCAL) 500 MG TABS tablet Take 500 mg by mouth daily.  calcium carbonate (TUMS) 500 MG chewable tablet Take 1 tablet by mouth daily.       Multiple Vitamin (MULTI-VITAMIN DAILY PO) Take  by mouth. No current facility-administered medications for this visit. I will continue her current medication regimen  which is part of the above treatment schedule. It has been helping with Ms. Martines chronic  medical problems which for this visit include:   Diagnoses of Chronic pain syndrome, Chronic pelvic pain in female, Fibromyalgia, Primary insomnia, and Recurrent major depressive disorder, in partial remission ([address] were pertinent to this visit. Risks and benefits of the medications and other alternative treatments  including no treatment were discussed with the patient. The common side effects of these medications were also explained to the patient. Informed verbal consent was obtained. Goals of current treatment regimen include improvement in pain, restoration of functioning- with focus on improvement in physical performance, general activity, work or disability,emotional distress, health care utilization and  decreased medication consumption. Will continue to monitor progress towards achieving/maintaining therapeutic goals with special emphasis on  1. Improvement in perceived interfernce  of pain with ADL's. Ability to do home exercises independently. Ability to do household chores indoor and/or outdoor work and social and leisure activities. Improve psychosocial and physical functioning. - she is showing progression towards this treatment goal with the current regimen. She was advised against drinking alcohol with the narcotic pain medicines, advised against driving or handling machinery while adjusting the dose of medicines or if having cognitive  issues related to the current medications. Risk of overdose and death, if medicines not taken as prescribed, were also discussed. If the patient develops new symptoms or if the symptoms worsen, the patient should call the office.     While transcribing every attempt was made to maintain the accuracy of the note in terms of it's contents,there may have been some errors made inadvertently. Thank you for allowing me to participate in the care of this patient. Dorothy Leigh MD.    Cc: Alena Cates MD    I, Geoff Cantu, scribing for in the presence  of Dr. Dorothy Leigh.   06/03/19  9:10 AM  Sascha Robles Assistant    I, Dr. Dorothy Leigh, personally performed the services described in this documentation as scribed by  Geoff Cantu MA in my presence and it is both accurate and complete

## 2019-07-15 ENCOUNTER — OFFICE VISIT (OUTPATIENT)
Dept: PAIN MANAGEMENT | Age: 34
End: 2019-07-15

## 2019-07-15 VITALS
BODY MASS INDEX: 24.03 KG/M2 | SYSTOLIC BLOOD PRESSURE: 105 MMHG | WEIGHT: 140 LBS | DIASTOLIC BLOOD PRESSURE: 72 MMHG | HEART RATE: 81 BPM

## 2019-07-15 DIAGNOSIS — N94.89 PELVIC PAIN SYNDROME: ICD-10-CM

## 2019-07-15 DIAGNOSIS — N80.9 ENDOMETRIOSIS: ICD-10-CM

## 2019-07-15 DIAGNOSIS — G89.29 CHRONIC PELVIC PAIN IN FEMALE: ICD-10-CM

## 2019-07-15 DIAGNOSIS — R10.2 CHRONIC PELVIC PAIN IN FEMALE: ICD-10-CM

## 2019-07-15 DIAGNOSIS — M79.7 FIBROMYALGIA: ICD-10-CM

## 2019-07-15 DIAGNOSIS — F51.01 PRIMARY INSOMNIA: ICD-10-CM

## 2019-07-15 DIAGNOSIS — G89.4 CHRONIC PAIN SYNDROME: ICD-10-CM

## 2019-07-15 PROCEDURE — 99213 OFFICE O/P EST LOW 20 MIN: CPT | Performed by: INTERNAL MEDICINE

## 2019-07-15 RX ORDER — GABAPENTIN 400 MG/1
CAPSULE ORAL
Qty: 120 CAPSULE | Refills: 1 | Status: SHIPPED | OUTPATIENT
Start: 2019-07-15 | End: 2019-08-26 | Stop reason: SDUPTHER

## 2019-07-15 RX ORDER — OXYCODONE HYDROCHLORIDE AND ACETAMINOPHEN 5; 325 MG/1; MG/1
0.5 TABLET ORAL 2 TIMES DAILY PRN
Qty: 42 TABLET | Refills: 0 | Status: SHIPPED | OUTPATIENT
Start: 2019-07-15 | End: 2019-08-26 | Stop reason: SDUPTHER

## 2019-07-15 RX ORDER — NORTRIPTYLINE HYDROCHLORIDE 25 MG/1
25-50 CAPSULE ORAL NIGHTLY
Qty: 60 CAPSULE | Refills: 0 | Status: SHIPPED | OUTPATIENT
Start: 2019-07-15 | End: 2019-08-26 | Stop reason: SDUPTHER

## 2019-07-15 NOTE — PROGRESS NOTES
by mouth 2 times daily as needed for Pain for up to 42 days. Max 1 per day 42 tablet 0    ondansetron (ZOFRAN-ODT) 4 MG disintegrating tablet Take 1 tablet by mouth every 8 hours as needed for Nausea 90 tablet 2    minocycline (MINOCIN;DYNACIN) 50 MG capsule Take 1 capsule by mouth 2 times daily 60 capsule 3    Probiotic Product (PROBIOTIC DAILY PO) Take by mouth daily      calcium carbonate (OSCAL) 500 MG TABS tablet Take 500 mg by mouth daily.  calcium carbonate (TUMS) 500 MG chewable tablet Take 1 tablet by mouth daily.  Multiple Vitamin (MULTI-VITAMIN DAILY PO) Take  by mouth.  tobramycin (TOBREX) 0.3 % ophthalmic solution Place 2 drops into right eye TID 1 Bottle 0     No facility-administered medications prior to visit. SOCIAL/FAMILY/PAST MEDICAL HISTORY: Ms. Merlin Drones, family and past medical history was reviewed. REVIEW OF SYSTEMS:    Respiratory: Negative for apnea, chest tightness and shortness of breath or change in baseline breathing. Gastrointestinal: Negative for nausea, vomiting, abdominal pain, diarrhea, constipation, blood in stool and abdominal distention. PHYSICAL EXAM:   Nursing note and vitals reviewed. /72   Pulse 81   Wt 140 lb (63.5 kg)   BMI 24.03 kg/m²   Constitutional: She appears well-developed and well-nourished. No acute distress. Skin: Skin is warm and dry, good turgor. No rash noted. She is not diaphoretic. Cardiovascular: Normal rate, regular rhythm, normal heart sounds, and does not have murmur. Pulmonary/Chest: Effort normal. No respiratory distress. She does not have wheezes in the lung fields. She has no rales. Neurological/Psychiatric:She is alert and oriented to person, place, and time. Coordination is  normal.  Her mood isAppropriate and affect is Neutral/Euthymic(normal) . IMPRESSION:   1. Chronic pain syndrome    2. Fibromyalgia    3. Pelvic pain syndrome    4. Endometriosis    5.  Chronic pelvic pain in female    6. Primary insomnia        PLAN:  Informed verbal consent was obtained  -[place] record was obtained and reviewed  for the last one year and no indicators of drug misuse  were found. Any other controlled substance prescriptions  seen on the record have been accounted for, I am aware of the patient receiving these medications. [place] record will be rechecked as part of office protocol.    -continue with current opioid regimen   -Adv Biofeedback, relaxation and meditation techniques. Referral to psychologist for CBT was also discussed with patient  -Urine drug screen with GC/MS for opiates and drugs of abuse was ordered and will follow up on results.   -walking/stretching exercises as advised    -can increase Pamelor to 25 mg 1-2 nightly     Current Outpatient Medications   Medication Sig Dispense Refill    gabapentin (NEURONTIN) 400 MG capsule Take 1 tab in the am, 1 tab in the afternoon and 2 tabs in the pm 120 capsule 1    nortriptyline (PAMELOR) 25 MG capsule Take 1-2 capsules by mouth nightly 60 capsule 0    oxyCODONE-acetaminophen (PERCOCET) 5-325 MG per tablet Take 0.5 tablets by mouth 2 times daily as needed for Pain for up to 42 days. Max 1 per day 42 tablet 0    ondansetron (ZOFRAN-ODT) 4 MG disintegrating tablet Take 1 tablet by mouth every 8 hours as needed for Nausea 90 tablet 2    minocycline (MINOCIN;DYNACIN) 50 MG capsule Take 1 capsule by mouth 2 times daily 60 capsule 3    Probiotic Product (PROBIOTIC DAILY PO) Take by mouth daily      calcium carbonate (OSCAL) 500 MG TABS tablet Take 500 mg by mouth daily.  calcium carbonate (TUMS) 500 MG chewable tablet Take 1 tablet by mouth daily.  Multiple Vitamin (MULTI-VITAMIN DAILY PO) Take  by mouth. No current facility-administered medications for this visit. I will continue her current medication regimen  which is part of the above treatment schedule. It has been helping with Ms. Shona Torres's chronic  medical problems

## 2019-08-26 ENCOUNTER — OFFICE VISIT (OUTPATIENT)
Dept: PAIN MANAGEMENT | Age: 34
End: 2019-08-26

## 2019-08-26 VITALS
HEART RATE: 72 BPM | BODY MASS INDEX: 23.34 KG/M2 | WEIGHT: 136 LBS | DIASTOLIC BLOOD PRESSURE: 66 MMHG | SYSTOLIC BLOOD PRESSURE: 109 MMHG

## 2019-08-26 DIAGNOSIS — N94.89 PELVIC PAIN SYNDROME: ICD-10-CM

## 2019-08-26 DIAGNOSIS — G89.29 CHRONIC PELVIC PAIN IN FEMALE: ICD-10-CM

## 2019-08-26 DIAGNOSIS — N80.9 ENDOMETRIOSIS: ICD-10-CM

## 2019-08-26 DIAGNOSIS — G89.4 CHRONIC PAIN SYNDROME: ICD-10-CM

## 2019-08-26 DIAGNOSIS — R10.2 CHRONIC PELVIC PAIN IN FEMALE: ICD-10-CM

## 2019-08-26 DIAGNOSIS — M79.7 FIBROMYALGIA: ICD-10-CM

## 2019-08-26 PROCEDURE — 99213 OFFICE O/P EST LOW 20 MIN: CPT | Performed by: INTERNAL MEDICINE

## 2019-08-26 RX ORDER — GABAPENTIN 400 MG/1
CAPSULE ORAL
Qty: 120 CAPSULE | Refills: 1 | Status: SHIPPED | OUTPATIENT
Start: 2019-08-26 | End: 2019-10-07 | Stop reason: SDUPTHER

## 2019-08-26 RX ORDER — NORTRIPTYLINE HYDROCHLORIDE 25 MG/1
25-50 CAPSULE ORAL NIGHTLY
Qty: 60 CAPSULE | Refills: 0 | Status: SHIPPED | OUTPATIENT
Start: 2019-08-26 | End: 2019-10-07 | Stop reason: SDUPTHER

## 2019-08-26 RX ORDER — OXYCODONE HYDROCHLORIDE AND ACETAMINOPHEN 5; 325 MG/1; MG/1
0.5 TABLET ORAL 2 TIMES DAILY PRN
Qty: 42 TABLET | Refills: 0 | Status: SHIPPED | OUTPATIENT
Start: 2019-08-26 | End: 2019-10-07 | Stop reason: SDUPTHER

## 2019-08-26 NOTE — PROGRESS NOTES
caffeine, avoid cheese products, increase dietary fiber, increase activity and exercise as tolerated and relax regularly and enjoy meals   -Urine drug screen with GC/MS confirmation for opiates and drugs of abuse was reviewed and the results are positive for drugs of abuse +low dose THC and the prescribed medications were present. The medications' drug  levels are normal she states she was using some OTC CBD oil to help with the pain  -Chronic opioid treatment protocol was discussed with the patient again including taking medications only as prescribed , using one pharmacy and getting all controlled substances from one physician unless okayed with me. Proper safeguarding and disposal of medications were also discussed with the patient.    -will d/c all OTC CBD products  -will monitor closely      Current Outpatient Medications   Medication Sig Dispense Refill    gabapentin (NEURONTIN) 400 MG capsule Take 1 tab in the am, 1 tab in the afternoon and 2 tabs in the pm 120 capsule 1    nortriptyline (PAMELOR) 25 MG capsule Take 1-2 capsules by mouth nightly 60 capsule 0    oxyCODONE-acetaminophen (PERCOCET) 5-325 MG per tablet Take 0.5 tablets by mouth 2 times daily as needed for Pain for up to 42 days. Max 1 per day 42 tablet 0    ondansetron (ZOFRAN-ODT) 4 MG disintegrating tablet Take 1 tablet by mouth every 8 hours as needed for Nausea 90 tablet 2    minocycline (MINOCIN;DYNACIN) 50 MG capsule Take 1 capsule by mouth 2 times daily 60 capsule 3    Probiotic Product (PROBIOTIC DAILY PO) Take by mouth daily      calcium carbonate (OSCAL) 500 MG TABS tablet Take 500 mg by mouth daily.  calcium carbonate (TUMS) 500 MG chewable tablet Take 1 tablet by mouth daily.  Multiple Vitamin (MULTI-VITAMIN DAILY PO) Take  by mouth. No current facility-administered medications for this visit. I will continue her current medication regimen  which is part of the above treatment schedule.  It has been helping

## 2019-10-07 ENCOUNTER — OFFICE VISIT (OUTPATIENT)
Dept: PAIN MANAGEMENT | Age: 34
End: 2019-10-07

## 2019-10-07 VITALS
SYSTOLIC BLOOD PRESSURE: 110 MMHG | BODY MASS INDEX: 24.03 KG/M2 | DIASTOLIC BLOOD PRESSURE: 76 MMHG | WEIGHT: 140 LBS | HEART RATE: 82 BPM

## 2019-10-07 DIAGNOSIS — R10.2 CHRONIC PELVIC PAIN IN FEMALE: ICD-10-CM

## 2019-10-07 DIAGNOSIS — M79.7 FIBROMYALGIA: ICD-10-CM

## 2019-10-07 DIAGNOSIS — G89.4 CHRONIC PAIN SYNDROME: ICD-10-CM

## 2019-10-07 DIAGNOSIS — G89.29 CHRONIC PELVIC PAIN IN FEMALE: ICD-10-CM

## 2019-10-07 DIAGNOSIS — N80.9 ENDOMETRIOSIS: ICD-10-CM

## 2019-10-07 DIAGNOSIS — N94.89 PELVIC PAIN SYNDROME: ICD-10-CM

## 2019-10-07 PROCEDURE — 99213 OFFICE O/P EST LOW 20 MIN: CPT | Performed by: INTERNAL MEDICINE

## 2019-10-07 RX ORDER — GABAPENTIN 400 MG/1
CAPSULE ORAL
Qty: 120 CAPSULE | Refills: 1 | Status: SHIPPED | OUTPATIENT
Start: 2019-10-07 | End: 2019-11-18 | Stop reason: SDUPTHER

## 2019-10-07 RX ORDER — NORTRIPTYLINE HYDROCHLORIDE 25 MG/1
25-50 CAPSULE ORAL NIGHTLY
Qty: 60 CAPSULE | Refills: 0 | Status: SHIPPED | OUTPATIENT
Start: 2019-10-07 | End: 2019-11-18 | Stop reason: SDUPTHER

## 2019-10-07 RX ORDER — OXYCODONE HYDROCHLORIDE AND ACETAMINOPHEN 5; 325 MG/1; MG/1
0.5 TABLET ORAL 2 TIMES DAILY PRN
Qty: 42 TABLET | Refills: 0 | Status: SHIPPED | OUTPATIENT
Start: 2019-10-07 | End: 2019-11-18 | Stop reason: SDUPTHER

## 2019-10-17 ENCOUNTER — TELEPHONE (OUTPATIENT)
Dept: PAIN MANAGEMENT | Age: 34
End: 2019-10-17

## 2019-11-18 ENCOUNTER — OFFICE VISIT (OUTPATIENT)
Dept: PAIN MANAGEMENT | Age: 34
End: 2019-11-18

## 2019-11-18 VITALS
SYSTOLIC BLOOD PRESSURE: 116 MMHG | HEART RATE: 77 BPM | DIASTOLIC BLOOD PRESSURE: 76 MMHG | BODY MASS INDEX: 23.17 KG/M2 | WEIGHT: 135 LBS

## 2019-11-18 DIAGNOSIS — G89.4 CHRONIC PAIN SYNDROME: ICD-10-CM

## 2019-11-18 DIAGNOSIS — G89.29 CHRONIC PELVIC PAIN IN FEMALE: ICD-10-CM

## 2019-11-18 DIAGNOSIS — M79.7 FIBROMYALGIA: ICD-10-CM

## 2019-11-18 DIAGNOSIS — N94.89 PELVIC PAIN SYNDROME: ICD-10-CM

## 2019-11-18 DIAGNOSIS — N80.9 ENDOMETRIOSIS: ICD-10-CM

## 2019-11-18 DIAGNOSIS — R10.2 CHRONIC PELVIC PAIN IN FEMALE: ICD-10-CM

## 2019-11-18 PROCEDURE — 99213 OFFICE O/P EST LOW 20 MIN: CPT | Performed by: INTERNAL MEDICINE

## 2019-11-18 RX ORDER — OXYCODONE HYDROCHLORIDE AND ACETAMINOPHEN 5; 325 MG/1; MG/1
0.5 TABLET ORAL 2 TIMES DAILY PRN
Qty: 50 TABLET | Refills: 0 | Status: SHIPPED | OUTPATIENT
Start: 2019-11-18 | End: 2020-01-06 | Stop reason: SDUPTHER

## 2019-11-18 RX ORDER — NORTRIPTYLINE HYDROCHLORIDE 25 MG/1
25-50 CAPSULE ORAL NIGHTLY
Qty: 60 CAPSULE | Refills: 1 | Status: SHIPPED | OUTPATIENT
Start: 2019-11-18 | End: 2020-01-06 | Stop reason: SDUPTHER

## 2019-11-18 RX ORDER — GABAPENTIN 400 MG/1
CAPSULE ORAL
Qty: 120 CAPSULE | Refills: 1 | Status: SHIPPED | OUTPATIENT
Start: 2019-11-18 | End: 2020-01-06 | Stop reason: SDUPTHER

## 2019-12-19 ENCOUNTER — OFFICE VISIT (OUTPATIENT)
Dept: FAMILY MEDICINE CLINIC | Age: 34
End: 2019-12-19

## 2019-12-19 VITALS
OXYGEN SATURATION: 99 % | DIASTOLIC BLOOD PRESSURE: 72 MMHG | HEIGHT: 63 IN | BODY MASS INDEX: 23.57 KG/M2 | HEART RATE: 59 BPM | WEIGHT: 133 LBS | SYSTOLIC BLOOD PRESSURE: 104 MMHG

## 2019-12-19 DIAGNOSIS — N80.9 ENDOMETRIOSIS: ICD-10-CM

## 2019-12-19 DIAGNOSIS — J45.991 COUGH VARIANT ASTHMA: Primary | ICD-10-CM

## 2019-12-19 PROCEDURE — 99213 OFFICE O/P EST LOW 20 MIN: CPT | Performed by: FAMILY MEDICINE

## 2019-12-19 RX ORDER — DIPHENHYDRAMINE HCL 25 MG
25 TABLET ORAL EVERY 6 HOURS PRN
COMMUNITY
End: 2020-06-22

## 2019-12-19 RX ORDER — FLUTICASONE PROPIONATE AND SALMETEROL 232; 14 UG/1; UG/1
1 POWDER, METERED RESPIRATORY (INHALATION) 2 TIMES DAILY
Qty: 1 EACH | Refills: 0 | Status: SHIPPED | OUTPATIENT
Start: 2019-12-19 | End: 2022-07-12 | Stop reason: SDUPTHER

## 2019-12-19 ASSESSMENT — ENCOUNTER SYMPTOMS: COUGH: 1

## 2020-01-06 ENCOUNTER — OFFICE VISIT (OUTPATIENT)
Dept: PAIN MANAGEMENT | Age: 35
End: 2020-01-06

## 2020-01-06 VITALS
HEART RATE: 81 BPM | WEIGHT: 135 LBS | DIASTOLIC BLOOD PRESSURE: 69 MMHG | BODY MASS INDEX: 23.73 KG/M2 | SYSTOLIC BLOOD PRESSURE: 110 MMHG

## 2020-01-06 PROCEDURE — 99213 OFFICE O/P EST LOW 20 MIN: CPT | Performed by: INTERNAL MEDICINE

## 2020-01-06 RX ORDER — OXYCODONE HYDROCHLORIDE AND ACETAMINOPHEN 5; 325 MG/1; MG/1
0.5 TABLET ORAL 2 TIMES DAILY PRN
Qty: 42 TABLET | Refills: 0 | Status: SHIPPED | OUTPATIENT
Start: 2020-01-06 | End: 2020-02-17 | Stop reason: SDUPTHER

## 2020-01-06 RX ORDER — GABAPENTIN 400 MG/1
CAPSULE ORAL
Qty: 120 CAPSULE | Refills: 1 | Status: SHIPPED | OUTPATIENT
Start: 2020-01-06 | End: 2020-02-17 | Stop reason: SDUPTHER

## 2020-01-06 RX ORDER — NORTRIPTYLINE HYDROCHLORIDE 25 MG/1
25-50 CAPSULE ORAL NIGHTLY
Qty: 60 CAPSULE | Refills: 1 | Status: SHIPPED | OUTPATIENT
Start: 2020-01-06 | End: 2020-02-17 | Stop reason: SDUPTHER

## 2020-01-06 NOTE — PROGRESS NOTES
(AIRDUO RESPICLICK 076/11) 049-68 MCG/ACT AEPB Inhale 1 puff into the lungs 2 times daily 1 each 0    nortriptyline (PAMELOR) 25 MG capsule Take 1-2 capsules by mouth nightly 60 capsule 1    oxyCODONE-acetaminophen (PERCOCET) 5-325 MG per tablet Take 0.5 tablets by mouth 2 times daily as needed for Pain for up to 50 days. Max 1 per day 50 tablet 0    ondansetron (ZOFRAN-ODT) 4 MG disintegrating tablet Take 1 tablet by mouth every 8 hours as needed for Nausea 90 tablet 2    Probiotic Product (PROBIOTIC DAILY PO) Take by mouth daily      calcium carbonate (TUMS) 500 MG chewable tablet Take 1 tablet by mouth daily.  Multiple Vitamin (MULTI-VITAMIN DAILY PO) Take  by mouth.  gabapentin (NEURONTIN) 400 MG capsule Take 1 tab in the am, 1 tab in the afternoon and 2 tabs in the pm 120 capsule 1     No facility-administered medications prior to visit. SOCIAL/FAMILY/PAST MEDICAL HISTORY: Ms. Nasrin Mascorro, family and past medical history was reviewed. REVIEW OF SYSTEMS:    Respiratory: Negative for apnea, chest tightness and shortness of breath or change in baseline breathing. Gastrointestinal: Negative for nausea, vomiting, abdominal pain, diarrhea, constipation, blood in stool and abdominal distention. PHYSICAL EXAM:   Nursing note and vitals reviewed. /69   Pulse 81   Wt 135 lb (61.2 kg)   LMP 12/14/2019   BMI 23.73 kg/m²   Constitutional: She appears well-developed and well-nourished. No acute distress. Skin: Skin is warm and dry, good turgor. No rash noted. She is not diaphoretic. Cardiovascular: Normal rate, regular rhythm, normal heart sounds, and does not have murmur. Pulmonary/Chest: Effort normal. No respiratory distress. She does not have wheezes in the lung fields. She has no rales. Neurological/Psychiatric:She is alert and oriented to person, place, and time. Coordination is  normal.  Her mood isAppropriate and affect is Flat/blunted and Anxious . IMPRESSION:   1. Chronic pain syndrome    2. Fibromyalgia    3. Endometriosis    4. Chronic pelvic pain in female    5. Pelvic pain syndrome        PLAN:  Informed verbal consent was obtained  -continue with current opioid regimen, Percocet PRN  -continue with Pamelor and Neurontin  -She was advised to increase fluids ( 5-7  glasses of fluid daily), limit caffeine, avoid cheese products, increase dietary fiber, increase activity and exercise as tolerated and relax regularly and enjoy meals   -walking/stretching exercises as advised     Current Outpatient Medications   Medication Sig Dispense Refill    diphenhydrAMINE (BENADRYL ALLERGY) 25 MG tablet Take 25 mg by mouth every 6 hours as needed for Itching or Sleep Use in place of nortriptyline      Fluticasone-Salmeterol (AIRDUO RESPICLICK 683/13) 143-65 MCG/ACT AEPB Inhale 1 puff into the lungs 2 times daily 1 each 0    nortriptyline (PAMELOR) 25 MG capsule Take 1-2 capsules by mouth nightly 60 capsule 1    oxyCODONE-acetaminophen (PERCOCET) 5-325 MG per tablet Take 0.5 tablets by mouth 2 times daily as needed for Pain for up to 50 days. Max 1 per day 50 tablet 0    ondansetron (ZOFRAN-ODT) 4 MG disintegrating tablet Take 1 tablet by mouth every 8 hours as needed for Nausea 90 tablet 2    Probiotic Product (PROBIOTIC DAILY PO) Take by mouth daily      calcium carbonate (TUMS) 500 MG chewable tablet Take 1 tablet by mouth daily.  Multiple Vitamin (MULTI-VITAMIN DAILY PO) Take  by mouth.  gabapentin (NEURONTIN) 400 MG capsule Take 1 tab in the am, 1 tab in the afternoon and 2 tabs in the pm 120 capsule 1     No current facility-administered medications for this visit. I will continue her current medication regimen  which is part of the above treatment schedule. It has been helping with Ms. Jay Briggs chronic  medical problems which for this visit include:   Diagnoses of Chronic pain syndrome, Fibromyalgia, Endometriosis, Chronic pelvic

## 2020-01-30 RX ORDER — ONDANSETRON 4 MG/1
4 TABLET, ORALLY DISINTEGRATING ORAL EVERY 8 HOURS PRN
Qty: 90 TABLET | Refills: 2 | Status: SHIPPED | OUTPATIENT
Start: 2020-01-30 | End: 2022-07-12 | Stop reason: SDUPTHER

## 2020-01-30 NOTE — TELEPHONE ENCOUNTER
Patient requesting a medication refill.   Medication: Zofran 4 mg  Pharmacy: [facility]  Last office visit: 12/19/19  Next office visit: Visit date not found

## 2020-02-17 ENCOUNTER — OFFICE VISIT (OUTPATIENT)
Dept: PAIN MANAGEMENT | Age: 35
End: 2020-02-17

## 2020-02-17 VITALS
HEART RATE: 71 BPM | DIASTOLIC BLOOD PRESSURE: 71 MMHG | SYSTOLIC BLOOD PRESSURE: 114 MMHG | WEIGHT: 135 LBS | BODY MASS INDEX: 23.73 KG/M2

## 2020-02-17 PROCEDURE — 99213 OFFICE O/P EST LOW 20 MIN: CPT | Performed by: INTERNAL MEDICINE

## 2020-02-17 RX ORDER — OXYCODONE HYDROCHLORIDE AND ACETAMINOPHEN 5; 325 MG/1; MG/1
0.5 TABLET ORAL 2 TIMES DAILY PRN
Qty: 42 TABLET | Refills: 0 | Status: SHIPPED | OUTPATIENT
Start: 2020-02-17 | End: 2020-03-30 | Stop reason: SDUPTHER

## 2020-02-17 RX ORDER — NORTRIPTYLINE HYDROCHLORIDE 25 MG/1
25-50 CAPSULE ORAL NIGHTLY
Qty: 60 CAPSULE | Refills: 1 | Status: SHIPPED | OUTPATIENT
Start: 2020-02-17 | End: 2020-03-30 | Stop reason: SDUPTHER

## 2020-02-17 RX ORDER — GABAPENTIN 400 MG/1
CAPSULE ORAL
Qty: 120 CAPSULE | Refills: 1 | Status: SHIPPED | OUTPATIENT
Start: 2020-02-17 | End: 2020-03-30 | Stop reason: SDUPTHER

## 2020-02-17 RX ORDER — MELOXICAM 15 MG/1
15 TABLET ORAL DAILY
Qty: 30 TABLET | Refills: 0 | Status: SHIPPED | OUTPATIENT
Start: 2020-02-17 | End: 2020-06-22

## 2020-03-30 ENCOUNTER — OFFICE VISIT (OUTPATIENT)
Dept: PAIN MANAGEMENT | Age: 35
End: 2020-03-30

## 2020-03-30 VITALS
SYSTOLIC BLOOD PRESSURE: 106 MMHG | TEMPERATURE: 97 F | WEIGHT: 140 LBS | DIASTOLIC BLOOD PRESSURE: 75 MMHG | HEART RATE: 83 BPM | BODY MASS INDEX: 24.6 KG/M2

## 2020-03-30 PROCEDURE — 99213 OFFICE O/P EST LOW 20 MIN: CPT | Performed by: INTERNAL MEDICINE

## 2020-03-30 RX ORDER — NORTRIPTYLINE HYDROCHLORIDE 25 MG/1
25-50 CAPSULE ORAL NIGHTLY
Qty: 60 CAPSULE | Refills: 1 | Status: SHIPPED | OUTPATIENT
Start: 2020-03-30 | End: 2020-05-11 | Stop reason: SDUPTHER

## 2020-03-30 RX ORDER — GABAPENTIN 400 MG/1
CAPSULE ORAL
Qty: 120 CAPSULE | Refills: 1 | Status: SHIPPED | OUTPATIENT
Start: 2020-03-30 | End: 2020-05-11 | Stop reason: SDUPTHER

## 2020-03-30 RX ORDER — OXYCODONE HYDROCHLORIDE AND ACETAMINOPHEN 5; 325 MG/1; MG/1
0.5 TABLET ORAL 2 TIMES DAILY PRN
Qty: 42 TABLET | Refills: 0 | Status: SHIPPED | OUTPATIENT
Start: 2020-03-30 | End: 2020-05-11 | Stop reason: SDUPTHER

## 2020-03-30 NOTE — PROGRESS NOTES
Chandrakant Chamberlain Jilg  1985  <[id number]>      HISTORY OF PRESENT ILLNESS:  Ms. Merline Salen is a 29 y.o. female returns for a follow up visit for pain management  She has a diagnosis of   1. Chronic pain syndrome    2. Fibromyalgia    3. Endometriosis    4. Pelvic pain syndrome    5. Primary insomnia    . She complains of pain in the Neck, low back, bilateral arms and legs She rates the pain 8/10 and describes it as sharp, aching, burning, numbness, pins and needles. Current treatment regimen has helped relieve about 30% of the pain. She denies any side effects from the current pain regimen. Patient reports that since the last follow up visit the physical functioning is worse, family/social relationships are unchanged, mood is unchanged sleep patterns are unchanged, and that the overall functioning is worse. Patient denies misusing/abusing her narcotic pain medications or using any illegal drugs. There are No indicators for possible drug abuse, addiction or diversion problems. Ms. Merline Salen states she has been doing fair. She states she is using an inhaler for Asthma now. She says her pelvic/abdomen pain is about the same, she gets flare ups periodically. Patient mentions she is using Neurontin along with Pamelor. ALLERGIES: Patients list of allergies were reviewed     MEDICATIONS: Ms. Merline Salen list of medications were reviewed. Her current medications are   Outpatient Medications Prior to Visit   Medication Sig Dispense Refill    nortriptyline (PAMELOR) 25 MG capsule Take 1-2 capsules by mouth nightly 60 capsule 1    gabapentin (NEURONTIN) 400 MG capsule Take 1 tab in the am, 1 tab in the afternoon and 2 tabs in the pm 120 capsule 1    oxyCODONE-acetaminophen (PERCOCET) 5-325 MG per tablet Take 0.5 tablets by mouth 2 times daily as needed for Pain for up to 42 days.  Max 1 per day 42 tablet 0    meloxicam (MOBIC) 15 MG tablet Take 1 tablet by mouth daily 30 tablet 0    ondansetron (ZOFRAN-ODT) 4 MG disintegrating tablet Take 1 tablet by mouth every 8 hours as needed for Nausea 90 tablet 2    diphenhydrAMINE (BENADRYL ALLERGY) 25 MG tablet Take 25 mg by mouth every 6 hours as needed for Itching or Sleep Use in place of nortriptyline      Fluticasone-Salmeterol (AIRDUO RESPICLICK 353/35) 108-55 MCG/ACT AEPB Inhale 1 puff into the lungs 2 times daily 1 each 0    Probiotic Product (PROBIOTIC DAILY PO) Take by mouth daily      calcium carbonate (TUMS) 500 MG chewable tablet Take 1 tablet by mouth daily.  Multiple Vitamin (MULTI-VITAMIN DAILY PO) Take  by mouth. No facility-administered medications prior to visit. SOCIAL/FAMILY/PAST MEDICAL HISTORY: Ms. Nasrin Mascorro, family and past medical history was reviewed. REVIEW OF SYSTEMS:    Respiratory: Negative for apnea, chest tightness and shortness of breath or change in baseline breathing. Gastrointestinal: Negative for nausea, vomiting, abdominal pain, diarrhea, constipation, blood in stool and abdominal distention. PHYSICAL EXAM:   Nursing note and vitals reviewed. /75   Pulse 83   Temp 97 °F (36.1 °C)   Wt 140 lb (63.5 kg)   Breastfeeding No   BMI 24.60 kg/m²   Constitutional: She appears well-developed and well-nourished. No acute distress. Skin: Skin is warm and dry, good turgor. No rash noted. She is not diaphoretic. Cardiovascular: Normal rate, regular rhythm, normal heart sounds, and does not have murmur. Pulmonary/Chest: Effort normal. No respiratory distress. She does not have wheezes in the lung fields. She has no rales. Neurological/Psychiatric:She is alert and oriented to person, place, and time. Coordination is  normal.  Her mood isAppropriate and affect is Neutral/Euthymic(normal) . Her    IMPRESSION:   1. Chronic pain syndrome    2. Fibromyalgia    3. Endometriosis    4. Pelvic pain syndrome    5.  Chronic pelvic pain in female        PLAN:  Informed verbal consent was obtained  -[place] record was obtained and reviewed  for the last one year and no indicators of drug misuse  were found. Any other controlled substance prescriptions  seen on the record have been accounted for, I am aware of the patient receiving these medications. Lisbeth Zimmerman [place] record will be rechecked as part of office protocol.    -continue with current opioid regimen, Percocet 1 per day PRN   -Adv Biofeedback, relaxation and meditation techniques. Referral to psychologist for CBT was also discussed with patient  -she was advised proper sleep hygiene-told to avoid:use of caffeine or other stimulants after noon, alcohol use near bedtime, long or frequent naps during the day, erratic sleep schedule, heavy meals near bedtime, vigorous exercise near bedtime and use of electronic devices near bedtime   -ORT score is 1     Current Outpatient Medications   Medication Sig Dispense Refill    nortriptyline (PAMELOR) 25 MG capsule Take 1-2 capsules by mouth nightly 60 capsule 1    gabapentin (NEURONTIN) 400 MG capsule Take 1 tab in the am, 1 tab in the afternoon and 2 tabs in the pm 120 capsule 1    oxyCODONE-acetaminophen (PERCOCET) 5-325 MG per tablet Take 0.5 tablets by mouth 2 times daily as needed for Pain for up to 42 days. Max 1 per day 42 tablet 0    meloxicam (MOBIC) 15 MG tablet Take 1 tablet by mouth daily 30 tablet 0    ondansetron (ZOFRAN-ODT) 4 MG disintegrating tablet Take 1 tablet by mouth every 8 hours as needed for Nausea 90 tablet 2    diphenhydrAMINE (BENADRYL ALLERGY) 25 MG tablet Take 25 mg by mouth every 6 hours as needed for Itching or Sleep Use in place of nortriptyline      Fluticasone-Salmeterol (AIRDUO RESPICLICK 219/51) 208-34 MCG/ACT AEPB Inhale 1 puff into the lungs 2 times daily 1 each 0    Probiotic Product (PROBIOTIC DAILY PO) Take by mouth daily      calcium carbonate (TUMS) 500 MG chewable tablet Take 1 tablet by mouth daily.       Multiple Vitamin (MULTI-VITAMIN DAILY PO) Take  by

## 2020-05-11 ENCOUNTER — VIRTUAL VISIT (OUTPATIENT)
Dept: PAIN MANAGEMENT | Age: 35
End: 2020-05-11

## 2020-05-11 PROCEDURE — 99213 OFFICE O/P EST LOW 20 MIN: CPT | Performed by: INTERNAL MEDICINE

## 2020-05-11 RX ORDER — GABAPENTIN 400 MG/1
CAPSULE ORAL
Qty: 120 CAPSULE | Refills: 1 | Status: SHIPPED | OUTPATIENT
Start: 2020-05-11 | End: 2020-06-22 | Stop reason: SDUPTHER

## 2020-05-11 RX ORDER — NORTRIPTYLINE HYDROCHLORIDE 25 MG/1
25-50 CAPSULE ORAL NIGHTLY
Qty: 60 CAPSULE | Refills: 1 | Status: SHIPPED | OUTPATIENT
Start: 2020-05-11 | End: 2020-10-26 | Stop reason: ALTCHOICE

## 2020-05-11 RX ORDER — OXYCODONE HYDROCHLORIDE AND ACETAMINOPHEN 5; 325 MG/1; MG/1
0.5 TABLET ORAL 2 TIMES DAILY PRN
Qty: 42 TABLET | Refills: 0 | Status: SHIPPED | OUTPATIENT
Start: 2020-05-11 | End: 2020-06-22 | Stop reason: SDUPTHER

## 2020-05-11 NOTE — PROGRESS NOTES
TELE HEALTH VISIT (AUDIO-VISUAL)    Pursuant to the emergency declaration under the [facility], [facility] waiver authority and the [employer] Act, this Virtual  Visit was conducted, with patient's/legal guardian's consent, to reduce the patient's risk of exposure to COVID-19 and provide continuity of care for an established patient. Service is  provided through a video synchronous discussion virtually to substitute for in-person clinic visit. Due to this being a TeleHealth encounter (During GEZD-71 public health emergency), evaluation of the following organ systems was limited: Vitals/Constitutional/EENT/Resp/CV/GI//MS/Neuro/Skin/Wped-Parn-Nyx. Griselda Torres  1985  <[id number]>    Ms. Thea Villa is being seen virtually for a follow up visit using one of the following techniques-Doxy. me/[email or web address] Video visit/Google Duo or Face time. Informed verbal consent to the virtual visit was obtained from Ms. Thea Villa. Risks associated with HIPPA compliance with the virtual visit was explained to the patient. Ms. Thea Villa is at her residence and Dr. Sathish Pollock is in his office. HISTORY OF PRESENT ILLNESS:  Ms. Thea Villa is a 29 y.o. female  being assessed for a follow up visit for pain management for evaluation of ongoing care regarding her symptoms and monitoring of compliance with long term use high risk medications. She has a diagnosis of   1. Chronic pain syndrome    2. Fibromyalgia    3. Endometriosis    4. Pelvic pain syndrome    . She complains of pain in the Low back  with radiation to the shoulders Bilateral, arms Bilateral, elbows Bilateral and hands Bilateral . She rates the pain 8/10 and describes it as sharp, aching, burning, numbness, pins and needles. Current treatment regimen has helped relieve about 30% of the pain. She denies any side effects from the current pain regimen.  Patient reports relax regularly and enjoy meals   -Walking/Stretching exercises as advised    Current Outpatient Medications   Medication Sig Dispense Refill    nortriptyline (PAMELOR) 25 MG capsule Take 1-2 capsules by mouth nightly 60 capsule 1    oxyCODONE-acetaminophen (PERCOCET) 5-325 MG per tablet Take 0.5 tablets by mouth 2 times daily as needed for Pain for up to 42 days. Max 1 per day 42 tablet 0    meloxicam (MOBIC) 15 MG tablet Take 1 tablet by mouth daily 30 tablet 0    ondansetron (ZOFRAN-ODT) 4 MG disintegrating tablet Take 1 tablet by mouth every 8 hours as needed for Nausea 90 tablet 2    diphenhydrAMINE (BENADRYL ALLERGY) 25 MG tablet Take 25 mg by mouth every 6 hours as needed for Itching or Sleep Use in place of nortriptyline      Fluticasone-Salmeterol (AIRDUO RESPICLICK 186/73) 069-99 MCG/ACT AEPB Inhale 1 puff into the lungs 2 times daily 1 each 0    Probiotic Product (PROBIOTIC DAILY PO) Take by mouth daily      calcium carbonate (TUMS) 500 MG chewable tablet Take 1 tablet by mouth daily.  Multiple Vitamin (MULTI-VITAMIN DAILY PO) Take  by mouth.  gabapentin (NEURONTIN) 400 MG capsule Take 1 tab in the am, 1 tab in the afternoon and 2 tabs in the pm (Patient not taking: Reported on 5/11/2020) 120 capsule 1     No current facility-administered medications for this visit. I will continue her current medication regimen  which is part of the above treatment schedule. It has been helping with Ms. Ramona Walton chronic  medical problems which for this visit include:   Diagnoses of Chronic pain syndrome, Fibromyalgia, Endometriosis, and Pelvic pain syndrome were pertinent to this visit. Risks and benefits of the medications and other alternative treatments  including no treatment were discussed with the patient. The common side effects of these medications were also explained to the patient. Informed verbal consent was obtained.    Goals of current treatment regimen include improvement in recurrence of condition/risk factors/secondary impairments

## 2020-06-22 ENCOUNTER — OFFICE VISIT (OUTPATIENT)
Dept: PAIN MANAGEMENT | Age: 35
End: 2020-06-22

## 2020-06-22 VITALS
WEIGHT: 140 LBS | DIASTOLIC BLOOD PRESSURE: 70 MMHG | BODY MASS INDEX: 24.6 KG/M2 | TEMPERATURE: 98.8 F | HEART RATE: 75 BPM | SYSTOLIC BLOOD PRESSURE: 104 MMHG

## 2020-06-22 PROCEDURE — 99213 OFFICE O/P EST LOW 20 MIN: CPT | Performed by: INTERNAL MEDICINE

## 2020-06-22 RX ORDER — GABAPENTIN 400 MG/1
CAPSULE ORAL
Qty: 120 CAPSULE | Refills: 1 | Status: SHIPPED | OUTPATIENT
Start: 2020-06-22 | End: 2020-08-03 | Stop reason: SDUPTHER

## 2020-06-22 RX ORDER — OXYCODONE HYDROCHLORIDE AND ACETAMINOPHEN 5; 325 MG/1; MG/1
0.5 TABLET ORAL 2 TIMES DAILY PRN
Qty: 42 TABLET | Refills: 0 | Status: SHIPPED | OUTPATIENT
Start: 2020-06-22 | End: 2020-08-03 | Stop reason: SDUPTHER

## 2020-06-22 RX ORDER — OXYCODONE HYDROCHLORIDE AND ACETAMINOPHEN 5; 325 MG/1; MG/1
0.5 TABLET ORAL EVERY 6 HOURS PRN
Qty: 42 TABLET | Refills: 0 | Status: SHIPPED | OUTPATIENT
Start: 2020-06-22 | End: 2020-06-22 | Stop reason: CLARIF

## 2020-06-22 NOTE — PROGRESS NOTES
Janelle Quintanilla Jilg  1985  <[id number]>      HISTORY OF PRESENT ILLNESS:  Ms. Del Rio Found is a 29 y.o. female returns for a follow up visit for pain management  She has a diagnosis of   1. Chronic pain syndrome    2. Fibromyalgia    3. Endometriosis    4. Pelvic pain syndrome    5. Chronic pelvic pain in female    . She complains of pain in the neck, upper, mid, lower back, bilateral arms with radition to the bliateral knees  She rates the pain 8/10 and describes it as sharp, aching. Current treatment regimen has helped relieve about 30% of the pain. She denies any side effects from the current pain regimen. Patient reports that since the last follow up visit the physical functioning is worse, family/social relationships are unchanged, mood is worse sleep patterns are worse, and that the overall functioning is unchanged. Patient denies misusing/abusing her narcotic pain medications or using any illegal drugs. There are No indicators for possible drug abuse, addiction or diversion problems. Ms. Rhonda Estrada states she is doing fair, managing with the regimen. She says she is not using Mobic. Patient states she is using Pamelor along with Neurontin. Patient states her sleep is fair. Has fairly normal sleep latency. Averages about 4-6 hours of sleep a night. Denies any signs of sleep apnea. Feels somewhat rested in the morning. She reports she is working full time still. ALLERGIES: Patients list of allergies were reviewed     MEDICATIONS: Ms. Del Rio Found list of medications were reviewed. Her current medications are   Outpatient Medications Prior to Visit   Medication Sig Dispense Refill    nortriptyline (PAMELOR) 25 MG capsule Take 1-2 capsules by mouth nightly 60 capsule 1    ondansetron (ZOFRAN-ODT) 4 MG disintegrating tablet Take 1 tablet by mouth every 8 hours as needed for Nausea 90 tablet 2    Fluticasone-Salmeterol (AIRDUO RESPICLICK 935/33) 399-74 MCG/ACT AEPB Inhale 1 puff into the lungs 2 times daily 1 each 0    Probiotic Product (PROBIOTIC DAILY PO) Take by mouth daily      calcium carbonate (TUMS) 500 MG chewable tablet Take 1 tablet by mouth daily.  Multiple Vitamin (MULTI-VITAMIN DAILY PO) Take  by mouth.  oxyCODONE-acetaminophen (PERCOCET) 5-325 MG per tablet Take 0.5 tablets by mouth 2 times daily as needed for Pain for up to 42 days. Max 1 per day 42 tablet 0    gabapentin (NEURONTIN) 400 MG capsule Take 1 tab in the am, 1 tab in the afternoon and 2 tabs in the pm 120 capsule 1    meloxicam (MOBIC) 15 MG tablet Take 1 tablet by mouth daily 30 tablet 0    diphenhydrAMINE (BENADRYL ALLERGY) 25 MG tablet Take 25 mg by mouth every 6 hours as needed for Itching or Sleep Use in place of nortriptyline       No facility-administered medications prior to visit. SOCIAL/FAMILY/PAST MEDICAL HISTORY: Ms. Radha Winter, family and past medical history was reviewed. REVIEW OF SYSTEMS:    Respiratory: Negative for apnea, chest tightness and shortness of breath or change in baseline breathing. Gastrointestinal: Negative for nausea, vomiting, abdominal pain, diarrhea, constipation, blood in stool and abdominal distention. PHYSICAL EXAM:   Nursing note and vitals reviewed. /70   Pulse 75   Temp 98.8 °F (37.1 °C)   Wt 140 lb (63.5 kg)   Breastfeeding No   BMI 24.60 kg/m²   Constitutional: She appears well-developed and well-nourished. No acute distress. Skin: Skin is warm and dry, good turgor. No rash noted. She is not diaphoretic. Cardiovascular: Normal rate, regular rhythm, normal heart sounds, and does not have murmur. Pulmonary/Chest: Effort normal. No respiratory distress. She does not have wheezes in the lung fields. She has no rales. Neurological/Psychiatric:She is alert and oriented to person, place, and time. Coordination is  normal.  Her mood isAppropriate and affect is Neutral/Euthymic(normal) . Her    IMPRESSION:   1.  Chronic pain syndrome visit.      I will continue her current medication regimen  which is part of the above treatment schedule. It has been helping with Ms. Linda Ratliff chronic  medical problems which for this visit include:   Diagnoses of Chronic pain syndrome, Fibromyalgia, Endometriosis, Pelvic pain syndrome, and Chronic pelvic pain in female were pertinent to this visit. Risks and benefits of the medications and other alternative treatments  including no treatment were discussed with the patient. The common side effects of these medications were also explained to the patient. Informed verbal consent was obtained. Goals of current treatment regimen include improvement in pain, restoration of functioning- with focus on improvement in physical performance, general activity, work or disability,emotional distress, health care utilization and  decreased medication consumption. Will continue to monitor progress towards achieving/maintaining therapeutic goals with special emphasis on  1. Improvement in perceived interfernce  of pain with ADL's. Ability to do home exercises independently. Ability to do household chores indoor and/or outdoor work and social and leisure activities. Improve psychosocial and physical functioning. - she is showing progression towards this treatment goal with the current regimen. She was advised against drinking alcohol with the narcotic pain medicines, advised against driving or handling machinery while adjusting the dose of medicines or if having cognitive  issues related to the current medications. Risk of overdose and death, if medicines not taken as prescribed, were also discussed. If the patient develops new symptoms or if the symptoms worsen, the patient should call the office. While transcribing every attempt was made to maintain the accuracy of the note in terms of it's contents,there may have been some errors made inadvertently.   Thank you for allowing me to participate in the care of this

## 2020-08-03 ENCOUNTER — VIRTUAL VISIT (OUTPATIENT)
Dept: PAIN MANAGEMENT | Age: 35
End: 2020-08-03

## 2020-08-03 PROCEDURE — 99213 OFFICE O/P EST LOW 20 MIN: CPT | Performed by: INTERNAL MEDICINE

## 2020-08-03 RX ORDER — OXYCODONE HYDROCHLORIDE AND ACETAMINOPHEN 5; 325 MG/1; MG/1
0.5 TABLET ORAL 2 TIMES DAILY PRN
Qty: 42 TABLET | Refills: 0 | Status: SHIPPED | OUTPATIENT
Start: 2020-08-03 | End: 2020-09-14 | Stop reason: SDUPTHER

## 2020-08-03 RX ORDER — GABAPENTIN 400 MG/1
CAPSULE ORAL
Qty: 120 CAPSULE | Refills: 1 | Status: SHIPPED | OUTPATIENT
Start: 2020-08-03 | End: 2020-09-14 | Stop reason: SDUPTHER

## 2020-08-03 NOTE — PROGRESS NOTES
TELE HEALTH VISIT (AUDIO-VISUAL)    Pursuant to the emergency declaration under the [facility], [facility] waiver authority and the [employer] and [employer] Act, this Virtual  Visit was conducted, with patient's/legal guardian's consent, to reduce the patient's risk of exposure to COVID-19 and provide continuity of care for an established patient. Service is  provided through a video synchronous discussion virtually to substitute for in-person clinic visit. Due to this being a TeleHealth encounter (During TPEMV-84 public health emergency), evaluation of the following organ systems was limited: Vitals/Constitutional/EENT/Resp/CV/GI//MS/Neuro/Skin/Zzpi-Dcti-Wmk. Lord Arroyo Melissa  1985  <[id number]>    Ms. Vandana Frankel is being seen virtually for a follow up visit using one of the following techniques  Face time  Informed verbal consent to the virtual visit was obtained from Ms. Vandana Frankel. Risks associated with HIPPA compliance with the virtual visit was explained to the patient. Ms. Vandana Frankel is at her residence and Dr. Gt Holguin is in his office. HISTORY OF PRESENT ILLNESS:  Ms. Vandana Frankel is a 28 y.o. female  being assessed for a follow up visit for pain management for evaluation of ongoing care regarding her symptoms and monitoring of compliance with long term use high risk medications. She has a diagnosis of   1. Chronic pain syndrome    2. Fibromyalgia    3. Endometriosis    4. Pelvic pain syndrome    . She complains of pain in the neck, lower back  with radiation to the shoulders Bilateral, arms Bilateral and pelvic area, groin area . She rates the pain 8/10 and describes it as sharp, aching, cramping. Current treatment regimen has helped relieve about 30% of the pain. She denies any side effects from the current pain regimen.  Patient reports that since the last follow up visit the physical functioning is worse, family/social relationships are unchanged, mood is unchanged sleep patterns are better, and that the overall functioning is worse. Patient denies misusing/abusing her narcotic pain medications or using any illegal drugs. There are No indicators for possible drug abuse, addiction or diversion problems. Ms. Sourav Culp states she has been doing fair, pain has been tolerable with the regimen. Patient states she is using Percocet along with Neurontin and Pamelor. She says she gets occasional flare ups. Patient states her pelvic region is starting to hurt with sharp and stabbing pains at times. ALLERGIES: Patients list of allergies were reviewed     MEDICATIONS: Ms. Sourav Culp list of medications were reviewed. Her current medications are   Outpatient Medications Prior to Visit   Medication Sig Dispense Refill    gabapentin (NEURONTIN) 400 MG capsule Take 1 tab in the am, 1 tab in the afternoon and 2 tabs in the pm 120 capsule 1    oxyCODONE-acetaminophen (PERCOCET) 5-325 MG per tablet Take 0.5 tablets by mouth 2 times daily as needed for Pain for up to 42 days. Max 1 per day 42 tablet 0    nortriptyline (PAMELOR) 25 MG capsule Take 1-2 capsules by mouth nightly 60 capsule 1    ondansetron (ZOFRAN-ODT) 4 MG disintegrating tablet Take 1 tablet by mouth every 8 hours as needed for Nausea 90 tablet 2    Fluticasone-Salmeterol (AIRDUO RESPICLICK 025/10) 714-08 MCG/ACT AEPB Inhale 1 puff into the lungs 2 times daily 1 each 0    Probiotic Product (PROBIOTIC DAILY PO) Take by mouth daily      calcium carbonate (TUMS) 500 MG chewable tablet Take 1 tablet by mouth daily.  Multiple Vitamin (MULTI-VITAMIN DAILY PO) Take  by mouth. No facility-administered medications prior to visit. SOCIAL/FAMILY/PAST MEDICAL HISTORY: Ms. Rao Knowles, family and past medical history was reviewed.      REVIEW OF SYSTEMS:    Respiratory: Negative for apnea, chest tightness and shortness of breath or change in baseline breathing. Gastrointestinal: Negative for nausea, vomiting, abdominal pain, diarrhea, constipation, blood in stool and abdominal distention. PHYSICAL EXAM:   Nursing note and vitals per patient reviewed. There were no vitals taken for this visit. Constitutional: She appears well-developed and well-nourished. No acute distress. No respiratory distress. Skin: Skin appears to be warm and dry. No rashes or any other marks noted. She is not diaphoretic. Respiratory/Pulmonary: NO conversational dyspnea, no accessory muscle use, no coughing during exam. She does not appear to be in labored breathing. Neurological/Psychiatric:She is alert and oriented to person, place, and time. Coordination is  normal.  Her mood isAppropriate and affect is Neutral/Euthymic(normal). Musculoskeletal / Extremities: Range of motion is normal. Gait is normal, assistive devices use: none. IMPRESSION:   1. Chronic pain syndrome    2. Fibromyalgia    3. Endometriosis    4. Pelvic pain syndrome    5. Chronic pelvic pain in female        PLAN:  Informed verbal consent regarding treatment was obtained  -continue with current opioid regimen Percocet 1/2 BID PRN   -She was advised to increase fluids ( 5-7  glasses of fluid daily), limit caffeine, avoid cheese products, increase dietary fiber, increase activity and exercise as tolerated and relax regularly and enjoy meals   -Patient's urine drug screen results with GC/MS confirmation were obtained and reviewed and were negative for any illicit drugs. Prescribed medications were within acceptable range. -Adv Biofeedback, relaxation and meditation techniques.  Referral to psychologist for CBT was also discussed with patient     Current Outpatient Medications   Medication Sig Dispense Refill    gabapentin (NEURONTIN) 400 MG capsule Take 1 tab in the am, 1 tab in the afternoon and 2 tabs in the pm 120 capsule 1    oxyCODONE-acetaminophen (PERCOCET) 5-325 MG per tablet Take 0.5 tablets by mouth 2 times daily as needed for Pain for up to 42 days. Max 1 per day 42 tablet 0    nortriptyline (PAMELOR) 25 MG capsule Take 1-2 capsules by mouth nightly 60 capsule 1    ondansetron (ZOFRAN-ODT) 4 MG disintegrating tablet Take 1 tablet by mouth every 8 hours as needed for Nausea 90 tablet 2    Fluticasone-Salmeterol (AIRDUO RESPICLICK 520/39) 252-99 MCG/ACT AEPB Inhale 1 puff into the lungs 2 times daily 1 each 0    Probiotic Product (PROBIOTIC DAILY PO) Take by mouth daily      calcium carbonate (TUMS) 500 MG chewable tablet Take 1 tablet by mouth daily.  Multiple Vitamin (MULTI-VITAMIN DAILY PO) Take  by mouth. No current facility-administered medications for this visit. I will continue her current medication regimen  which is part of the above treatment schedule. It has been helping with Ms. Rola Inman chronic  medical problems which for this visit include:   Diagnoses of Chronic pain syndrome, Fibromyalgia, Endometriosis, and Pelvic pain syndrome were pertinent to this visit. Risks and benefits of the medications and other alternative treatments  including no treatment were discussed with the patient. The common side effects of these medications were also explained to the patient. Informed verbal consent was obtained. Goals of current treatment regimen include improvement in pain, restoration of functioning- with focus on improvement in physical performance, general activity, work or disability,emotional distress, health care utilization and  decreased medication consumption. Will continue to monitor progress towards achieving/maintaining therapeutic goals with special emphasis on  1. Improvement in perceived interfernce  of pain with ADL's. Ability to do home exercises independently. Ability to do household chores indoor and/or outdoor work and social and leisure activities. Improve psychosocial and physical functioning. - she is showing progression towards this treatment goal with the current regimen. She was advised against drinking alcohol with the narcotic pain medicines, advised against driving or handling machinery while adjusting the dose of medicines or if having cognitive  issues related to the current medications. Risk of overdose and death, if medicines not taken as prescribed, were also discussed. If the patient develops new symptoms or if the symptoms worsen, the patient should call the office. While transcribing every attempt was made to maintain the accuracy of the note in terms of it's contents,there may have been some errors made inadvertently. Thank you for allowing me to participate in the care of this patient.     Corey Gómez MD.    Cc: Sonido Alcaraz MD

## 2020-08-20 ENCOUNTER — TELEPHONE (OUTPATIENT)
Dept: FAMILY MEDICINE CLINIC | Age: 35
End: 2020-08-20

## 2020-09-14 ENCOUNTER — VIRTUAL VISIT (OUTPATIENT)
Dept: PAIN MANAGEMENT | Age: 35
End: 2020-09-14

## 2020-09-14 PROCEDURE — 99024 POSTOP FOLLOW-UP VISIT: CPT | Performed by: INTERNAL MEDICINE

## 2020-09-14 RX ORDER — GABAPENTIN 400 MG/1
CAPSULE ORAL
Qty: 120 CAPSULE | Refills: 1 | Status: SHIPPED | OUTPATIENT
Start: 2020-09-14 | End: 2020-10-26 | Stop reason: SDUPTHER

## 2020-09-14 RX ORDER — OXYCODONE HYDROCHLORIDE AND ACETAMINOPHEN 5; 325 MG/1; MG/1
0.5 TABLET ORAL EVERY 6 HOURS PRN
Qty: 42 TABLET | Refills: 0 | Status: SHIPPED | OUTPATIENT
Start: 2020-09-14 | End: 2020-10-26 | Stop reason: SDUPTHER

## 2020-09-14 NOTE — PROGRESS NOTES
TELE HEALTH VISIT (AUDIO-VISUAL)    Pursuant to the emergency declaration under the [facility], [facility] waiver authority and the [employer] and [employer] Act, this Virtual  Visit was conducted, with patient's/legal guardian's consent, to reduce the patient's risk of exposure to COVID-19 and provide continuity of care for an established patient. Service is  provided through a video synchronous discussion virtually to substitute for in-person clinic visit. Due to this being a TeleHealth encounter (During EKSUN-14 public health emergency), evaluation of the following organ systems was limited: Vitals/Constitutional/EENT/Resp/CV/GI//MS/Neuro/Skin/Yfyq-Glkg-Rdv. Mraia C Torres  1985  <[id number]>    Ms. Michael Garland is being seen virtually for a follow up visit using one of the following techniques  Face time  Informed verbal consent to the virtual visit was obtained from Ms. Michael Garland. Risks associated with HIPPA compliance with the virtual visit was explained to the patient. Ms. Michael Garland is at her residence and Dr. José Miguel Ramirez is in his office. HISTORY OF PRESENT ILLNESS:  Ms. Michael Garland is a 28 y.o. female  being assessed for a follow up visit for pain management for evaluation of ongoing care regarding her symptoms and monitoring of compliance with long term use high risk medications. She has a diagnosis of   1. Chronic pain syndrome    2. Fibromyalgia    3. Endometriosis    4. Chronic pelvic pain in female    . She complains of pain in the upper, lower back  with radiation to the shoulders Bilateral, arms Bilateral, elbows Bilateral, hands Bilateral, hips Bilateral and pelvic and groin area . She rates the pain 8/10 and describes it as burning. Current treatment regimen has helped relieve about 30% of the pain. She denies any side effects from the current pain regimen.  Patient reports that since the last follow up visit the physical functioning is unchanged, family/social relationships are unchanged, mood is unchanged sleep patterns are unchanged, and that the overall functioning is unchanged. Patient denies misusing/abusing her narcotic pain medications or using any illegal drugs. There are No indicators for possible drug abuse, addiction or diversion problems. Ms. Demond Live states she has been doing fair. Patient states she is feeling tired during the day, she thinks it is from [employer]. She mentions she is using Neurontin 800 mg at night along with Percocet 1-2 per day. She says her abdomen/pelvic area and back hurts the most. Patient denies any constipation symptoms. ALLERGIES: Patients list of allergies were reviewed     MEDICATIONS: Ms. Demond Live list of medications were reviewed. Her current medications are   Outpatient Medications Prior to Visit   Medication Sig Dispense Refill    gabapentin (NEURONTIN) 400 MG capsule Take 1 tab in the am, 1 tab in the afternoon and 2 tabs in the pm 120 capsule 1    oxyCODONE-acetaminophen (PERCOCET) 5-325 MG per tablet Take 0.5 tablets by mouth 2 times daily as needed for Pain for up to 42 days. Max 1 per day 42 tablet 0    nortriptyline (PAMELOR) 25 MG capsule Take 1-2 capsules by mouth nightly 60 capsule 1    ondansetron (ZOFRAN-ODT) 4 MG disintegrating tablet Take 1 tablet by mouth every 8 hours as needed for Nausea 90 tablet 2    Fluticasone-Salmeterol (AIRDUO RESPICLICK 485/74) 384-94 MCG/ACT AEPB Inhale 1 puff into the lungs 2 times daily 1 each 0    Probiotic Product (PROBIOTIC DAILY PO) Take by mouth daily      calcium carbonate (TUMS) 500 MG chewable tablet Take 1 tablet by mouth daily.  Multiple Vitamin (MULTI-VITAMIN DAILY PO) Take  by mouth. No facility-administered medications prior to visit. SOCIAL/FAMILY/PAST MEDICAL HISTORY: Ms. Galvan Ache, family and past medical history was reviewed.      REVIEW OF SYSTEMS:    Respiratory: Negative for apnea, chest tightness and shortness of breath or change in baseline breathing. Gastrointestinal: Negative for nausea, vomiting, abdominal pain, diarrhea, constipation, blood in stool and abdominal distention. PHYSICAL EXAM:   Nursing note and vitals per patient reviewed. There were no vitals taken for this visit. Constitutional: She appears well-developed and well-nourished. No acute distress. No respiratory distress. Skin: Skin appears to be warm and dry. No rashes or any other marks noted. She is not diaphoretic. Respiratory/Pulmonary: NO conversational dyspnea, no accessory muscle use, no coughing during exam. She does not appear to be in labored breathing. Neurological/Psychiatric:She is alert and oriented to person, place, and time. Coordination is  normal.  Her mood isAppropriate and affect is Neutral/Euthymic(normal). Musculoskeletal / Extremities: Range of motion is normal. Gait is normal, assistive devices use: none. IMPRESSION:   1. Chronic pain syndrome    2. Fibromyalgia    3. Endometriosis    4. Chronic pelvic pain in female    5. Pelvic pain syndrome        PLAN:  Informed verbal consent regarding treatment was obtained  -continue with current opioid regimen Percocet  -Adv Biofeedback, relaxation and meditation techniques.  Referral to psychologist for CBT was also discussed with patient  -She was advised to increase fluids ( 5-7  glasses of fluid daily), limit caffeine, avoid cheese products, increase dietary fiber, increase activity and exercise as tolerated and relax regularly and enjoy meals   -walking as tolerated   -d/c Pamelor  -advised to monitor fatigue symptoms     Current Outpatient Medications   Medication Sig Dispense Refill    gabapentin (NEURONTIN) 400 MG capsule Take 1 tab in the am, 1 tab in the afternoon and 2 tabs in the pm 120 capsule 1    oxyCODONE-acetaminophen (PERCOCET) 5-325 MG per tablet Take 0.5 tablets by mouth 2 times daily as needed for Pain for up to 42 days. Max 1 per day 42 tablet 0    nortriptyline (PAMELOR) 25 MG capsule Take 1-2 capsules by mouth nightly 60 capsule 1    ondansetron (ZOFRAN-ODT) 4 MG disintegrating tablet Take 1 tablet by mouth every 8 hours as needed for Nausea 90 tablet 2    Fluticasone-Salmeterol (AIRDUO RESPICLICK 247/37) 565-91 MCG/ACT AEPB Inhale 1 puff into the lungs 2 times daily 1 each 0    Probiotic Product (PROBIOTIC DAILY PO) Take by mouth daily      calcium carbonate (TUMS) 500 MG chewable tablet Take 1 tablet by mouth daily.  Multiple Vitamin (MULTI-VITAMIN DAILY PO) Take  by mouth. No current facility-administered medications for this visit. I will continue her current medication regimen  which is part of the above treatment schedule. It has been helping with Ms. Leon Grade chronic  medical problems which for this visit include:   Diagnoses of Chronic pain syndrome, Fibromyalgia, Endometriosis, and Chronic pelvic pain in female were pertinent to this visit. Risks and benefits of the medications and other alternative treatments  including no treatment were discussed with the patient. The common side effects of these medications were also explained to the patient. Informed verbal consent was obtained. Goals of current treatment regimen include improvement in pain, restoration of functioning- with focus on improvement in physical performance, general activity, work or disability,emotional distress, health care utilization and  decreased medication consumption. Will continue to monitor progress towards achieving/maintaining therapeutic goals with special emphasis on  1. Improvement in perceived interfernce  of pain with ADL's. Ability to do home exercises independently. Ability to do household chores indoor and/or outdoor work and social and leisure activities. Improve psychosocial and physical functioning. - she is showing progression towards this treatment goal with the current regimen. She was advised against drinking alcohol with the narcotic pain medicines, advised against driving or handling machinery while adjusting the dose of medicines or if having cognitive  issues related to the current medications. Risk of overdose and death, if medicines not taken as prescribed, were also discussed. If the patient develops new symptoms or if the symptoms worsen, the patient should call the office. While transcribing every attempt was made to maintain the accuracy of the note in terms of it's contents,there may have been some errors made inadvertently. Thank you for allowing me to participate in the care of this patient.     Kevan Hughes MD.    Cc: Tatum Erazo MD

## 2020-10-16 NOTE — PATIENT INSTRUCTIONS
Sun Protection Tips    Apply broad spectrum water resistant sunscreen with an SPF of at least 30 to exposed areas of the skin. Dont forget the ears and lips! Remember to reapply sunscreen about every 2 hours and after swimming or sweating. Wear sun protective clothing. Swim shirts (aka. rash guards) are a great idea and negates the need to reapply sunscreen in those areas. Seek the shade whenever possible especially between the hours of 10am and 4pm when the suns rays are the strongest.     Avoid tanning beds          Wear a wide brim hat while in the sun        Thanks for your visit! Feel free to send  Dr. Vinny Martin assistant, [employer], a [employer] message for any questions, concerns or  to schedule your cosmetic procedures.

## 2020-10-20 ENCOUNTER — OFFICE VISIT (OUTPATIENT)
Dept: DERMATOLOGY | Age: 35
End: 2020-10-20

## 2020-10-20 VITALS — TEMPERATURE: 97.9 F

## 2020-10-20 PROCEDURE — 99203 OFFICE O/P NEW LOW 30 MIN: CPT | Performed by: DERMATOLOGY

## 2020-10-20 NOTE — PROGRESS NOTES
[facility] ([facility]) Dermatology  [facility] [place], [address]       [address]  1985    28 y.o. female     Date of Visit: 10/20/2020    Chief Complaint:   Chief Complaint   Patient presents with    Alopecia     for 6 years        I was asked to see this patient by Dr. Christie Soria    History of Present Illness:  Lizeth Viveros is a 28 y.o. female who presents with the chief complaint of establish care and for the followin. Complains of chronic thinning of hair/hair loss to primarily crown of scalp but also to frontal hairline and vertex scalp that started about 6 years ago. Patient attributes the onset to Lupron injections which she was taking short-term at that time to treat endometriosis. does admit to history of different oral hormonal supplementation since she was 7.8 years old for treatment of endometriosis. States she stopped taking hormonal treatments about 5 years ago. Patient notices hair loss when showering/shampooing but denies clumps of hair falling out. Remembers pruritus to scalp in the past that she believes stopped about 2 years ago. Currently denies itching, burning, tenderness to scalp. Uses topikks fibers to conceal thinning of hair. Admits to a history of iron deficiency anemia states she has consistently been taking iron supplementation daily for at least 4 years. Denies recent blood work monitoring her anemia or iron deficiency in the last 1 year. States she saw a dermatologist in the past who attributed her hair loss secondary to iron deficiency. Denies a history of thyroid disease although admits her thyroid labs have not been checked in over 1 year. She has been using women's Rogaine 2% solution nightly for over 2 years, unsure if it is helping. Takes a daily multivitamin.   Finds the hair thinning very distressing emotionally, states it prohibits her from dating as it is embarrassing.     -Hairstyle(s) in youth: occassional tight ponytails for dance  -Hairstyle(s) in adulthood: natural and down  -Chemical relaxers: No  -Hair dye: Yes  -Sleeps w/ silk scarf or bonnet: No  -Hot comb use: No  -Flat iron use: Yes  -Associated symptoms: No  -Hair loss elsewhere on body: Yes, little to eyebrows and eyelashes  -Excessive hair growth: Yes, states excessive hair growth occurs to lower back, abdomen, cheeks which she will treat with an epilator.   -Female family members w/ similar symptoms: Yes-thinks her father has hair thinning. Adopted so unaware of majority of family hx. Review of Systems:  Constitutional: Reports general sense of well-being   Skin: No new or changing moles, no tendency to develop thick scars  Heme: No abnormal bruising or bleeding. Past Medical History, Family History, Surgical History, Medications and Allergies reviewed. Past Skin Hx:   Patient denies past history of melanoma, NMSC, dysplastic nevi, or chronic skin rashes. PFHx: Denies hx of MM or NMSC    PMHx: endometriosis, fibromyalgia     Family History   Adopted: Yes     Past Medical History:   Diagnosis Date    Chronic myofascial pain     Dr. Roland Vieira for pain control    Chronic pelvic pain in female     pelvic floor dysfunction    Depression     Endometriosis     hx of lupron/depo; surgery. hx of OCP trials.  Seizure disorder [facility])     neurology (while in [place]). off seizure meds since 2010, stable.        Past Surgical History:   Procedure Laterality Date    ABDOMEN SURGERY  2008, 2012    diagnostic laparascopy, scar tissue, endometriosis    LASIK      TYMPANOSTOMY TUBE PLACEMENT         Allergies   Allergen Reactions    Cymbalta [Duloxetine Hcl]      Fatigue, wt gain     Outpatient Medications Marked as Taking for the 10/20/20 encounter (Office Visit) with Shaye Casas,    Medication Sig Dispense Refill    gabapentin (NEURONTIN) 400 MG capsule Take 1 tab in the am, 1 tab in the afternoon and 2 tabs in the pm 120 capsule 1    oxyCODONE-acetaminophen (PERCOCET) 5-325 MG per tablet Take 0.5 tablets by mouth every 6 hours as needed for Pain (max 1 per day) for up to 42 days. 42 tablet 0    nortriptyline (PAMELOR) 25 MG capsule Take 1-2 capsules by mouth nightly 60 capsule 1    ondansetron (ZOFRAN-ODT) 4 MG disintegrating tablet Take 1 tablet by mouth every 8 hours as needed for Nausea 90 tablet 2    Fluticasone-Salmeterol (AIRDUO RESPICLICK 652/88) 871-41 MCG/ACT AEPB Inhale 1 puff into the lungs 2 times daily 1 each 0    Probiotic Product (PROBIOTIC DAILY PO) Take by mouth daily      calcium carbonate (TUMS) 500 MG chewable tablet Take 1 tablet by mouth daily.  Multiple Vitamin (MULTI-VITAMIN DAILY PO) Take  by mouth.          Social History:   Social History     Socioeconomic History    Marital status: Single     Spouse name: Not on file    Number of children: Not on file    Years of education: Not on file    Highest education level: Not on file   Occupational History    Not on file   Social Needs    Financial resource strain: Not on file    Food insecurity     Worry: Not on file     Inability: Not on file    Transportation needs     Medical: Not on file     Non-medical: Not on file   Tobacco Use    Smoking status: Former Smoker     Types: E-Cigarettes    Smokeless tobacco: Never Used    Tobacco comment: occasional eCIG   Substance and Sexual Activity    Alcohol use: No     Alcohol/week: 0.0 standard drinks    Drug use: No    Sexual activity: Not Currently   Lifestyle    Physical activity     Days per week: Not on file     Minutes per session: Not on file    Stress: Not on file   Relationships    Social connections     Talks on phone: Not on file     Gets together: Not on file     Attends [employer] service: Not on file     Active member of club or organization: Not on file     Attends meetings of clubs or organizations: Not on file     Relationship status: Not on file    Intimate partner violence     Fear of current or ex partner: Not on file     Emotionally abused: Not on file     Physically abused: Not on file     Forced sexual activity: Not on file   Other Topics Concern    Not on file   Social History Narrative    Not on file       Physical Examination     The following were examined and determined to be normal: Psych/Neuro, Head/face, Conjunctivae/eyelids, Gums/teeth/lips, Neck, Abdomen and Back. The following were examined and determined to be abnormal: Scalp/hair. Beaulieu phototype: 2    -Constitutional: Well appearing, no acute distress  -Neurological: Alert and oriented X 3  -Mood and Affect: Pleasant  Areas of skin examined as listed above:   1. Midline part with mild widening. Follicular ostia intact diffusely to scalp, no scale or erythema noted interfollicularly or perifollicularly. No scarring noted. Scalp Non-tender to palpation. Negative hair pull test.  No obvious receding hairline noted on exam.  Eyebrows and eyelashes with multiple terminal hairs and no perifollicular erythema and scale. No hirsutism to face, abdomen, and back noted on exam  Thomas scale: I-3                    Assessment and Plan     1. Alopecia        1. Alopecia  Favors non-scarring alopecia, favors component of androgenetic alopecia possibly telogen effluvium intermittently secondary to stressors and hx of iron deficiency anemia. Labs ordered:   - CBC Auto Differential; Future  - IRON; Future  - FERRITIN; Future  - TSH without Reflex  - FREE T4; Future  - COMPREHENSIVE METABOLIC PANEL; Future    -discussed importance of correcting any underlying iron deficiency anemia if it persists as this can lead to chronic hair thinning and shedding.     --Edu re: genetic predisposition, treatment is used to slow down rate of hair loss and variable, if any, regrowth may occur  -start: Patient does have mild evidence of androgenetic alopecia.   Informed patient to apply over-the-counter topical Rogaine 5% foam nightly to scalp and allow at least 3-4 months for initiation of hair growth and continue indefinitely, outcomes variable. If stop application, hair will revert back to baseline within 9-12 months. May experience mild itching, burning, irritation.  -start OTC nutrafol tablets as directed on bottle daily.     -discussed that goal is to slow down progression of hair loss and attempt to keep hair as much as possible. Regrowth is variable and may not occur at all, May take several months to years to ascertain treatment response.     -may continue camoflauge techniques such as toppiks fibers daily as needed.     -pt's level of distress is somewhat out of proportion to findings on exam      30 minutes was spent with the patient discussing dx, findings/test results, reviewing chart, tx options, side effects, patient progress, and follow up > 50% in face to face consultation. Return to Clinic:  6 month alopecia f/u  Discussed plan with patient and/or primary caretaker. Patient to call clinic with any questions / concerns. Reviewed proper use and side effects of treatment(s) and/or medication(s) with patient and/or primary caretaker. AVS provided or is available on [employer]   Note is transcribed using voice recognition software. Inadvertent computerized transcription errors may be present.

## 2020-10-20 NOTE — Clinical Note
Dr. Manfred Mendiola,    I had the pleasure of seeing your patient, Anni Wilson, in my office recently. Thanks so much for involving me in her care. Please see my note and call me if you have any questions.     Best regards,  Murlean Dates, DO

## 2020-10-26 ENCOUNTER — VIRTUAL VISIT (OUTPATIENT)
Dept: PAIN MANAGEMENT | Age: 35
End: 2020-10-26

## 2020-10-26 PROCEDURE — 99213 OFFICE O/P EST LOW 20 MIN: CPT | Performed by: INTERNAL MEDICINE

## 2020-10-26 RX ORDER — NORTRIPTYLINE HYDROCHLORIDE 10 MG/1
10-20 CAPSULE ORAL NIGHTLY
Qty: 60 CAPSULE | Refills: 0 | Status: SHIPPED | OUTPATIENT
Start: 2020-10-26 | End: 2021-01-18 | Stop reason: SDUPTHER

## 2020-10-26 RX ORDER — OXYCODONE HYDROCHLORIDE AND ACETAMINOPHEN 5; 325 MG/1; MG/1
0.5 TABLET ORAL EVERY 6 HOURS PRN
Qty: 42 TABLET | Refills: 0 | Status: SHIPPED | OUTPATIENT
Start: 2020-10-26 | End: 2020-12-07 | Stop reason: SDUPTHER

## 2020-10-26 RX ORDER — GABAPENTIN 400 MG/1
CAPSULE ORAL
Qty: 120 CAPSULE | Refills: 1 | Status: SHIPPED | OUTPATIENT
Start: 2020-10-26 | End: 2020-12-07 | Stop reason: SDUPTHER

## 2020-10-26 NOTE — PROGRESS NOTES
TELE HEALTH VISIT (AUDIO-VISUAL)    Pursuant to the emergency declaration under the [facility], [facility] waiver authority and the [employer] Act, this Virtual  Visit was conducted, with patient's/legal guardian's consent, to reduce the patient's risk of exposure to COVID-19 and provide continuity of care for an established patient. Service is  provided through a video synchronous discussion virtually to substitute for in-person clinic visit. Due to this being a TeleHealth encounter (During WNTPX-97 public health emergency), evaluation of the following organ systems was limited: Vitals/Constitutional/EENT/Resp/CV/GI//MS/Neuro/Skin/Sgsp-Xxhj-Aex. Kale Nick Torres  1985  <[id number]>    Ms. Eduardo Reid is being seen virtually for a follow up visit using one of the following techniques  Face time  Informed verbal consent to the virtual visit was obtained from Ms. Eduardo Reid. Risks associated with HIPPA compliance with the virtual visit was explained to the patient. Ms. Eduardo Reid is at her residence and Dr. Suzi Moreno is in his office. HISTORY OF PRESENT ILLNESS:  Ms. Eduardo Reid is a 28 y.o. female  being assessed for a follow up visit for pain management for evaluation of ongoing care regarding her symptoms and monitoring of compliance with long term use high risk medications. She has a diagnosis of   1. Chronic pain syndrome    2. Fibromyalgia    3. Endometriosis    4. Chronic pelvic pain in female    5. Pelvic pain syndrome    . She complains of pain in the lower back  with radiation to the pelvic and groin area . She rates the pain 7/10 and describes it as sharp, aching, cramping. Current treatment regimen has helped relieve about 30% of the pain. She denies any side effects from the current pain regimen.  Patient reports that since the last follow up visit the physical functioning is worse, family/social relationships are better, mood is unchanged sleep patterns are worse, and that the overall functioning is unchanged. Patient denies misusing/abusing her narcotic pain medications or using any illegal drugs. There are No indicators for possible drug abuse, addiction or diversion problems. Ms. Wu Moseley she has been doing fair. She states she is having problems with sleep now, she had to cut back on the Pamelor. Patient mentions she is using Neurontin along with Percocet 1/2 tablet BID. Patient denies any side effects with the medications. She states her abdomen pain and pelvic area also complaining of pain in the lower back also. ALLERGIES: Patients list of allergies were reviewed     MEDICATIONS: Ms. Ani Gonzalez list of medications were reviewed. Her current medications are   Outpatient Medications Prior to Visit   Medication Sig Dispense Refill    gabapentin (NEURONTIN) 400 MG capsule Take 1 tab in the am, 1 tab in the afternoon and 2 tabs in the pm 120 capsule 1    oxyCODONE-acetaminophen (PERCOCET) 5-325 MG per tablet Take 0.5 tablets by mouth every 6 hours as needed for Pain (max 1 per day) for up to 42 days. 42 tablet 0    nortriptyline (PAMELOR) 25 MG capsule Take 1-2 capsules by mouth nightly 60 capsule 1    ondansetron (ZOFRAN-ODT) 4 MG disintegrating tablet Take 1 tablet by mouth every 8 hours as needed for Nausea 90 tablet 2    Fluticasone-Salmeterol (AIRDUO RESPICLICK 292/78) 047-08 MCG/ACT AEPB Inhale 1 puff into the lungs 2 times daily 1 each 0    Probiotic Product (PROBIOTIC DAILY PO) Take by mouth daily      calcium carbonate (TUMS) 500 MG chewable tablet Take 1 tablet by mouth daily.  Multiple Vitamin (MULTI-VITAMIN DAILY PO) Take  by mouth. No facility-administered medications prior to visit. SOCIAL/FAMILY/PAST MEDICAL HISTORY: Ms. Kelsey Chawla, family and past medical history was reviewed.      REVIEW OF SYSTEMS:    Respiratory: Negative for apnea, chest tightness and shortness of breath or change in baseline breathing. Gastrointestinal: Negative for nausea, vomiting, abdominal pain, diarrhea, constipation, blood in stool and abdominal distention. PHYSICAL EXAM:   Nursing note and vitals per patient reviewed. There were no vitals taken for this visit. Constitutional: She appears well-developed and well-nourished. No acute distress. No respiratory distress. Skin: Skin appears to be warm and dry. No rashes or any other marks noted. She is not diaphoretic. Respiratory/Pulmonary: NO conversational dyspnea, no accessory muscle use, no coughing during exam. She does not appear to be in labored breathing. Neurological/Psychiatric:She is alert and oriented to person, place, and time. Coordination is  normal.  Her mood isAppropriate and affect is Neutral/Euthymic(normal). Musculoskeletal / Extremities: Range of motion is normal. Gait is normal, assistive devices use: none. IMPRESSION:   1. Chronic pain syndrome    2. Fibromyalgia    3. Endometriosis    4. Chronic pelvic pain in female    5. Pelvic pain syndrome        PLAN:  Informed verbal consent regarding treatment was obtained  -ROM/Stretching exercises as advised   -walking/stretching exercises as advised    -decrease Pamelor 10 mg 1-2 nightly  -continue with Percocet 1/2 tablet BID     Current Outpatient Medications   Medication Sig Dispense Refill    gabapentin (NEURONTIN) 400 MG capsule Take 1 tab in the am, 1 tab in the afternoon and 2 tabs in the pm 120 capsule 1    oxyCODONE-acetaminophen (PERCOCET) 5-325 MG per tablet Take 0.5 tablets by mouth every 6 hours as needed for Pain (max 1 per day) for up to 42 days.  42 tablet 0    nortriptyline (PAMELOR) 25 MG capsule Take 1-2 capsules by mouth nightly 60 capsule 1    ondansetron (ZOFRAN-ODT) 4 MG disintegrating tablet Take 1 tablet by mouth every 8 hours as needed for Nausea 90 tablet 2    Fluticasone-Salmeterol (Viji Pals 232/14) 232-14 MCG/ACT AEPB Inhale 1 puff into the lungs 2 times daily 1 each 0    Probiotic Product (PROBIOTIC DAILY PO) Take by mouth daily      calcium carbonate (TUMS) 500 MG chewable tablet Take 1 tablet by mouth daily.  Multiple Vitamin (MULTI-VITAMIN DAILY PO) Take  by mouth. No current facility-administered medications for this visit. I will continue her current medication regimen  which is part of the above treatment schedule. It has been helping with Ms. Davis Husbands chronic  medical problems which for this visit include:   Diagnoses of Chronic pain syndrome, Fibromyalgia, Endometriosis, Chronic pelvic pain in female, and Pelvic pain syndrome were pertinent to this visit. Risks and benefits of the medications and other alternative treatments  including no treatment were discussed with the patient. The common side effects of these medications were also explained to the patient. Informed verbal consent was obtained. Goals of current treatment regimen include improvement in pain, restoration of functioning- with focus on improvement in physical performance, general activity, work or disability,emotional distress, health care utilization and  decreased medication consumption. Will continue to monitor progress towards achieving/maintaining therapeutic goals with special emphasis on  1. Improvement in perceived interfernce  of pain with ADL's. Ability to do home exercises independently. Ability to do household chores indoor and/or outdoor work and social and leisure activities. Improve psychosocial and physical functioning. - she is showing progression towards this treatment goal with the current regimen. She was advised against drinking alcohol with the narcotic pain medicines, advised against driving or handling machinery while adjusting the dose of medicines or if having cognitive  issues related to the current medications. Risk of overdose and death, if medicines not taken as prescribed, were also discussed. If the patient develops new symptoms or if the symptoms worsen, the patient should call the office. While transcribing every attempt was made to maintain the accuracy of the note in terms of it's contents,there may have been some errors made inadvertently. Thank you for allowing me to participate in the care of this patient.     Raquel Sheriff MD.    Cc: Jose Squires MD

## 2020-11-30 ENCOUNTER — TELEPHONE (OUTPATIENT)
Dept: PAIN MANAGEMENT | Age: 35
End: 2020-11-30

## 2020-11-30 NOTE — TELEPHONE ENCOUNTER
FYI, Patient states she is having dental procedure this morning and was Rx-ed Triazolam .25 mg x 2 tabs

## 2020-12-07 ENCOUNTER — VIRTUAL VISIT (OUTPATIENT)
Dept: PAIN MANAGEMENT | Age: 35
End: 2020-12-07

## 2020-12-07 PROCEDURE — 99213 OFFICE O/P EST LOW 20 MIN: CPT | Performed by: INTERNAL MEDICINE

## 2020-12-07 RX ORDER — GABAPENTIN 400 MG/1
CAPSULE ORAL
Qty: 120 CAPSULE | Refills: 1 | Status: SHIPPED | OUTPATIENT
Start: 2020-12-07 | End: 2021-01-18 | Stop reason: SDUPTHER

## 2020-12-07 RX ORDER — OXYCODONE HYDROCHLORIDE AND ACETAMINOPHEN 5; 325 MG/1; MG/1
0.5 TABLET ORAL EVERY 6 HOURS PRN
Qty: 42 TABLET | Refills: 0 | Status: SHIPPED | OUTPATIENT
Start: 2020-12-07 | End: 2021-01-18 | Stop reason: SDUPTHER

## 2020-12-07 NOTE — PROGRESS NOTES
TELE HEALTH VISIT (AUDIO-VISUAL)    Pursuant to the emergency declaration under the [facility], [facility], [facility] waiver authority and the [employer] and [employer] Act, this Virtual  Visit was conducted, with patient's/legal guardian's consent, to reduce the patient's risk of exposure to COVID-19 and provide continuity of care for an established patient. Service is  provided through a video synchronous discussion virtually to substitute for in-person clinic visit. Due to this being a TeleHealth encounter (During [facility]-60 public health emergency), evaluation of the following organ systems was limited: Vitals/Constitutional/EENT/Resp/CV/GI//MS/Neuro/Skin/Irqq-Imwe-Ghl. Laurine Goodell Jilg  1985  <[id number]>    Ms. Francisco Salas is being seen virtually for a follow up visit using one of the following techniques  Face time  Informed verbal consent to the virtual visit was obtained from Ms. Francisco Salas. Risks associated with HIPPA compliance with the virtual visit was explained to the patient. Ms. Francisco Salas is at her residence and Dr. Woody Smith is in his office. HISTORY OF PRESENT ILLNESS:  Ms. Francisco Salas is a 28 y.o. female  being assessed for a follow up visit for pain management for evaluation of ongoing care regarding her symptoms and monitoring of compliance with long term use high risk medications. She has a diagnosis of   1. Chronic pain syndrome    2. Fibromyalgia    3. Pelvic pain syndrome    4. Endometriosis    5. Chronic pelvic pain in female    . She complains of pain in the lower back  with radiation to the abdomen and upper leg Bilateral . She rates the pain 8/10 and describes it as aching, shooting. Current treatment regimen has helped relieve about 30% of the pain. She denies any side effects from the current pain regimen.  Patient reports that since the last follow up visit the physical functioning is unchanged, family/social relationships are unchanged, mood is unchanged sleep patterns are unchanged, and that the overall functioning is unchanged. Patient denies misusing/abusing her narcotic pain medications or using any illegal drugs. There are No indicators for possible drug abuse, addiction or diversion problems. Ms. May Sebastian states she has been doing ok. She reports she had dental procedure done recently and was given some Benzo's for sedation. She states she is using Pamelor along with Neurontin and Percocet 1/2 tablet BID. 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 Patient denies any constipation symptoms. She says her breathing has been ok. ALLERGIES: Patients list of allergies were reviewed     MEDICATIONS: Ms. May Sebastian list of medications were reviewed. Her current medications are   Outpatient Medications Prior to Visit   Medication Sig Dispense Refill    oxyCODONE-acetaminophen (PERCOCET) 5-325 MG per tablet Take 0.5 tablets by mouth every 6 hours as needed for Pain (max 1 per day) for up to 42 days. 42 tablet 0    gabapentin (NEURONTIN) 400 MG capsule Take 1 tab in the am, 1 tab in the afternoon and 2 tabs in the pm 120 capsule 1    nortriptyline (PAMELOR) 10 MG capsule Take 1-2 capsules by mouth nightly 60 capsule 0    ondansetron (ZOFRAN-ODT) 4 MG disintegrating tablet Take 1 tablet by mouth every 8 hours as needed for Nausea 90 tablet 2    Fluticasone-Salmeterol (AIRDUO RESPICLICK 176/25) 304-68 MCG/ACT AEPB Inhale 1 puff into the lungs 2 times daily 1 each 0    Probiotic Product (PROBIOTIC DAILY PO) Take by mouth daily      calcium carbonate (TUMS) 500 MG chewable tablet Take 1 tablet by mouth daily.  Multiple Vitamin (MULTI-VITAMIN DAILY PO) Take  by mouth. No facility-administered medications prior to visit. SOCIAL/FAMILY/PAST MEDICAL HISTORY: Ms. Filemon Urrutiaralf, family and past medical history was reviewed.      REVIEW OF SYSTEMS:    Respiratory: Negative for apnea, chest tightness and shortness of breath or change in baseline breathing. Gastrointestinal: Negative for nausea, vomiting, abdominal pain, diarrhea, constipation, blood in stool and abdominal distention. PHYSICAL EXAM:   Nursing note and vitals per patient reviewed. There were no vitals taken for this visit. Constitutional: She appears well-developed and well-nourished. No acute distress. No respiratory distress. Skin: Skin appears to be warm and dry. No rashes or any other marks noted. She is not diaphoretic. Respiratory/Pulmonary: NO conversational dyspnea, no accessory muscle use, no coughing during exam. She does not appear to be in labored breathing. Neurological/Psychiatric:She is alert and oriented to person, place, and time. Coordination is  normal.  Her mood isAppropriate and affect is Neutral/Euthymic(normal). Musculoskeletal / Extremities: Range of motion is normal. Gait is normal, assistive devices use: none. IMPRESSION:   1. Chronic pain syndrome    2. Fibromyalgia    3. Pelvic pain syndrome    4. Endometriosis    5. Chronic pelvic pain in female        PLAN:  Informed verbal consent regarding treatment was obtained  -continue with current opioid regimen Percocet 1 per day  -She was advised to increase fluids ( 5-7  glasses of fluid daily), limit caffeine, avoid cheese products, increase dietary fiber, increase activity and exercise as tolerated and relax regularly and enjoy meals   -walking/stretching exercises as advised    -Continue with all other adjuvant medications as before    Current Outpatient Medications   Medication Sig Dispense Refill    oxyCODONE-acetaminophen (PERCOCET) 5-325 MG per tablet Take 0.5 tablets by mouth every 6 hours as needed for Pain (max 1 per day) for up to 42 days.  42 tablet 0    gabapentin (NEURONTIN) 400 MG capsule Take 1 tab in the am, 1 tab in the afternoon and 2 tabs in the pm 120 capsule 1    nortriptyline (PAMELOR) 10 MG capsule Take 1-2 capsules by mouth nightly 60 capsule 0    ondansetron (ZOFRAN-ODT) 4 MG disintegrating tablet Take 1 tablet by mouth every 8 hours as needed for Nausea 90 tablet 2    Fluticasone-Salmeterol (AIRDUO RESPICLICK 367/59) 099-16 MCG/ACT AEPB Inhale 1 puff into the lungs 2 times daily 1 each 0    Probiotic Product (PROBIOTIC DAILY PO) Take by mouth daily      calcium carbonate (TUMS) 500 MG chewable tablet Take 1 tablet by mouth daily.  Multiple Vitamin (MULTI-VITAMIN DAILY PO) Take  by mouth. No current facility-administered medications for this visit. I will continue her current medication regimen  which is part of the above treatment schedule. It has been helping with Ms. Beau Zelaya chronic  medical problems which for this visit include:   Diagnoses of Chronic pain syndrome, Fibromyalgia, Pelvic pain syndrome, Endometriosis, and Chronic pelvic pain in female were pertinent to this visit. Risks and benefits of the medications and other alternative treatments  including no treatment were discussed with the patient. The common side effects of these medications were also explained to the patient. Informed verbal consent was obtained. Goals of current treatment regimen include improvement in pain, restoration of functioning- with focus on improvement in physical performance, general activity, work or disability,emotional distress, health care utilization and  decreased medication consumption. Will continue to monitor progress towards achieving/maintaining therapeutic goals with special emphasis on  1. Improvement in perceived interfernce  of pain with ADL's. Ability to do home exercises independently. Ability to do household chores indoor and/or outdoor work and social and leisure activities. Improve psychosocial and physical functioning. - she is showing progression towards this treatment goal with the current regimen.      She was advised against drinking alcohol with the narcotic pain medicines, advised against driving or handling machinery while adjusting the dose of medicines or if having cognitive  issues related to the current medications. Risk of overdose and death, if medicines not taken as prescribed, were also discussed. If the patient develops new symptoms or if the symptoms worsen, the patient should call the office. While transcribing every attempt was made to maintain the accuracy of the note in terms of it's contents,there may have been some errors made inadvertently. Thank you for allowing me to participate in the care of this patient.     Warren Kelly MD.    Cc: Terry Larios MD

## 2021-01-18 ENCOUNTER — VIRTUAL VISIT (OUTPATIENT)
Dept: PAIN MANAGEMENT | Age: 36
End: 2021-01-18
Payer: COMMERCIAL

## 2021-01-18 DIAGNOSIS — G89.29 CHRONIC PELVIC PAIN IN FEMALE: ICD-10-CM

## 2021-01-18 DIAGNOSIS — N94.89 PELVIC PAIN SYNDROME: ICD-10-CM

## 2021-01-18 DIAGNOSIS — N80.9 ENDOMETRIOSIS: ICD-10-CM

## 2021-01-18 DIAGNOSIS — M79.7 FIBROMYALGIA: ICD-10-CM

## 2021-01-18 DIAGNOSIS — G89.4 CHRONIC PAIN SYNDROME: ICD-10-CM

## 2021-01-18 DIAGNOSIS — R10.2 CHRONIC PELVIC PAIN IN FEMALE: ICD-10-CM

## 2021-01-18 PROCEDURE — 99213 OFFICE O/P EST LOW 20 MIN: CPT | Performed by: INTERNAL MEDICINE

## 2021-01-18 RX ORDER — OXYCODONE HYDROCHLORIDE AND ACETAMINOPHEN 5; 325 MG/1; MG/1
0.5 TABLET ORAL EVERY 6 HOURS PRN
Qty: 42 TABLET | Refills: 0 | Status: SHIPPED | OUTPATIENT
Start: 2021-01-18 | End: 2021-03-01 | Stop reason: SDUPTHER

## 2021-01-18 RX ORDER — GABAPENTIN 400 MG/1
CAPSULE ORAL
Qty: 120 CAPSULE | Refills: 1 | Status: SHIPPED | OUTPATIENT
Start: 2021-01-18 | End: 2021-03-01 | Stop reason: SDUPTHER

## 2021-01-18 RX ORDER — NORTRIPTYLINE HYDROCHLORIDE 10 MG/1
10-20 CAPSULE ORAL NIGHTLY
Qty: 60 CAPSULE | Refills: 0 | Status: SHIPPED | OUTPATIENT
Start: 2021-01-18 | End: 2021-03-01 | Stop reason: SDUPTHER

## 2021-01-18 NOTE — PROGRESS NOTES
TELE HEALTH VISIT (AUDIO-VISUAL)    Pursuant to the emergency declaration under the [facility], [facility] waiver authority and the [employer] and [employer] Act, this Virtual  Visit was conducted, with patient's/legal guardian's consent, to reduce the patient's risk of exposure to COVID-19 and provide continuity of care for an established patient. Service is  provided through a video synchronous discussion virtually to substitute for in-person clinic visit. Due to this being a TeleHealth encounter (During [facility]-63 public health emergency), evaluation of the following organ systems was limited: Vitals/Constitutional/EENT/Resp/CV/GI//MS/Neuro/Skin/Zpak-Eaex-Bcr. Jesusita Torres  1985  <[id number]>    Ms. Parth Camp is being seen virtually for a follow up visit using one of the following techniques  Facetime  Informed verbal consent to the virtual visit was obtained from Ms. Parth Camp. Risks associated with HIPPA compliance with the virtual visit was explained to the patient. Ms. Parth Camp is at her residence and Dr. Sheila Cardona is in his office. HISTORY OF PRESENT ILLNESS:  Ms. Parth Camp is a 28 y.o. female returns for a follow up visit for multiple medical problems. Her current presenting problems are   1. Chronic pain syndrome    2. Fibromyalgia    3. Pelvic pain syndrome    4. Endometriosis    5. Chronic pelvic pain in female    .  Multiple Vitamin (MULTI-VITAMIN DAILY PO) Take  by mouth.  oxyCODONE-acetaminophen (PERCOCET) 5-325 MG per tablet Take 0.5 tablets by mouth every 6 hours as needed for Pain (max 1 per day) for up to 42 days. 42 tablet 0    gabapentin (NEURONTIN) 400 MG capsule Take 1 tab in the am, 1 tab in the afternoon and 2 tabs in the pm 120 capsule 1    nortriptyline (PAMELOR) 10 MG capsule Take 1-2 capsules by mouth nightly 60 capsule 0     No facility-administered medications prior to visit. REVIEW OF SYSTEMS:    Respiratory: Negative for apnea, chest tightness and shortness of breath or change in baseline breathing. PHYSICAL EXAM:   Nursing note and vitals reviewed. There were no vitals taken for this visit. Constitutional: She appears well-developed and well-nourished. No acute distress. Cardiovascular: Normal rate, regular rhythm, normal heart sounds, and does not have murmur. Pulmonary/Chest: Effort normal. No respiratory distress. She does not have wheezes in the lung fields. She has no rales. Neurological/Psychiatric:She is alert and oriented to person, place, and time. Coordination is  normal.  Her mood isAppropriate and affect is Neutral/Euthymic(normal) . IMPRESSION:   1. Chronic pain syndrome    2. Fibromyalgia    3. Pelvic pain syndrome    4. Endometriosis    5. Chronic pelvic pain in female        PLAN:  Informed verbal consent was obtained  -Continue with current opioid regimen Percocet 1/2-1 per day   -Adv Biofeedback, relaxation and meditation techniques.  Referral to psychologist for CBT was also discussed with patient   -She was advised to increase fluids ( 5-7  glasses of fluid daily), limit caffeine, avoid cheese products, increase dietary fiber, increase activity and exercise as tolerated and relax regularly and enjoy meals   -Walking/Stretching exercises as advised    Current Outpatient Medications   Medication Sig Dispense Refill  oxyCODONE-acetaminophen (PERCOCET) 5-325 MG per tablet Take 0.5 tablets by mouth every 6 hours as needed for Pain (max 1 per day) for up to 42 days. 42 tablet 0    gabapentin (NEURONTIN) 400 MG capsule Take 1 tab in the am, 1 tab in the afternoon and 2 tabs in the pm 120 capsule 1    nortriptyline (PAMELOR) 10 MG capsule Take 1-2 capsules by mouth nightly 60 capsule 0    ondansetron (ZOFRAN-ODT) 4 MG disintegrating tablet Take 1 tablet by mouth every 8 hours as needed for Nausea 90 tablet 2    Fluticasone-Salmeterol (AIRDUO RESPICLICK 951/78) 169-79 MCG/ACT AEPB Inhale 1 puff into the lungs 2 times daily 1 each 0    Probiotic Product (PROBIOTIC DAILY PO) Take by mouth daily      calcium carbonate (TUMS) 500 MG chewable tablet Take 1 tablet by mouth daily.  Multiple Vitamin (MULTI-VITAMIN DAILY PO) Take  by mouth. No current facility-administered medications for this visit. I will continue her current medication regimen  which is part of the above treatment schedule. It has been helping with Ms. Rey Mcgill chronic  medical problems which for this visit include:   Diagnoses of Chronic pain syndrome, Fibromyalgia, Pelvic pain syndrome, Endometriosis, and Chronic pelvic pain in female were pertinent to this visit. Risks and benefits of the medications and other alternative treatments  including no treatment were discussed with the patient. The common side effects of these medications were also explained to the patient. Informed verbal consent was obtained. Goals of current treatment regimen include improvement in pain, restoration of functioning- with focus on improvement in physical performance, general activity, work or disability,emotional distress, health care utilization and  decreased medication consumption.  Will continue to monitor progress towards achieving/maintaining therapeutic goals with special emphasis on

## 2021-02-11 ENCOUNTER — TELEPHONE (OUTPATIENT)
Dept: PAIN MANAGEMENT | Age: 36
End: 2021-02-11

## 2021-02-11 NOTE — TELEPHONE ENCOUNTER
Submitted PA for OXYCODONE  [facility] Key: [id number]  STATUS: \"Approved for generic Oxycodone w/ Acetaminophen Tablet 5-325 MG, quantity up to 30 tablets per 30 days, under the pharmacy benefit. Generic substitution required when available\"    IThe patient was notified by phone; I LVM.

## 2021-02-17 ENCOUNTER — TELEPHONE (OUTPATIENT)
Dept: FAMILY MEDICINE CLINIC | Age: 36
End: 2021-02-17

## 2021-02-17 DIAGNOSIS — B37.9 ANTIBIOTIC-INDUCED YEAST INFECTION: Primary | ICD-10-CM

## 2021-02-17 DIAGNOSIS — T36.95XA ANTIBIOTIC-INDUCED YEAST INFECTION: Primary | ICD-10-CM

## 2021-02-17 RX ORDER — FLUCONAZOLE 150 MG/1
150 TABLET ORAL ONCE
Qty: 1 TABLET | Refills: 0 | Status: SHIPPED | OUTPATIENT
Start: 2021-02-17 | End: 2021-02-17

## 2021-02-17 NOTE — TELEPHONE ENCOUNTER
----- Message from [facility] sent at 2/16/2021 12:18 PM EST -----  Subject: Message to Provider    QUESTIONS  Information for Provider? Patient is requesting Sepideh Monge (generic brand)   because her dentist is prescribing her antibiotic for her tooth . Please   call the patient to advice . ---------------------------------------------------------------------------  --------------  [place] INFO  What is the best way for the office to contact you? OK to leave message on   voicemail  Preferred Call Back Phone Number? [phone]  ---------------------------------------------------------------------------  --------------  SCRIPT ANSWERS  Relationship to Patient?  Self

## 2021-03-01 ENCOUNTER — TELEPHONE (OUTPATIENT)
Dept: PAIN MANAGEMENT | Age: 36
End: 2021-03-01

## 2021-03-01 ENCOUNTER — VIRTUAL VISIT (OUTPATIENT)
Dept: PAIN MANAGEMENT | Age: 36
End: 2021-03-01
Payer: COMMERCIAL

## 2021-03-01 DIAGNOSIS — N94.89 PELVIC PAIN SYNDROME: ICD-10-CM

## 2021-03-01 DIAGNOSIS — G89.4 CHRONIC PAIN SYNDROME: ICD-10-CM

## 2021-03-01 DIAGNOSIS — R10.2 CHRONIC PELVIC PAIN IN FEMALE: ICD-10-CM

## 2021-03-01 DIAGNOSIS — M79.7 FIBROMYALGIA: ICD-10-CM

## 2021-03-01 DIAGNOSIS — G89.29 CHRONIC PELVIC PAIN IN FEMALE: ICD-10-CM

## 2021-03-01 DIAGNOSIS — N80.9 ENDOMETRIOSIS: ICD-10-CM

## 2021-03-01 PROCEDURE — 99213 OFFICE O/P EST LOW 20 MIN: CPT | Performed by: INTERNAL MEDICINE

## 2021-03-01 RX ORDER — GABAPENTIN 400 MG/1
CAPSULE ORAL
Qty: 120 CAPSULE | Refills: 1 | Status: SHIPPED | OUTPATIENT
Start: 2021-03-01 | End: 2021-05-11

## 2021-03-01 RX ORDER — NORTRIPTYLINE HYDROCHLORIDE 10 MG/1
10-20 CAPSULE ORAL NIGHTLY
Qty: 60 CAPSULE | Refills: 0 | Status: SHIPPED | OUTPATIENT
Start: 2021-03-01 | End: 2021-04-19

## 2021-03-01 RX ORDER — OXYCODONE HYDROCHLORIDE AND ACETAMINOPHEN 5; 325 MG/1; MG/1
0.5 TABLET ORAL EVERY 6 HOURS PRN
Qty: 42 TABLET | Refills: 0 | Status: SHIPPED | OUTPATIENT
Start: 2021-03-01 | End: 2021-04-12 | Stop reason: SDUPTHER

## 2021-03-01 NOTE — PROGRESS NOTES
TELE HEALTH VISIT (AUDIO-VISUAL)    Pursuant to the emergency declaration under the [facility], [facility] waiver authority and the [employer] and [employer] Act, this Virtual  Visit was conducted, with patient's/legal guardian's consent, to reduce the patient's risk of exposure to COVID-19 and provide continuity of care for an established patient. Service is  provided through a video synchronous discussion virtually to substitute for in-person clinic visit. Due to this being a TeleHealth encounter (During [facility]-93 public health emergency), evaluation of the following organ systems was limited: Vitals/Constitutional/EENT/Resp/CV/GI//MS/Neuro/Skin/Ymoo-Kicb-Gsu. Jimi Torres  1985  <[id number]>    Ms. Archana Regalado is being seen virtually for a follow up visit using one of the following techniques  [employer] Du[employer], Face time or Doxy. me  Informed verbal consent to the virtual visit was obtained from Ms. Archana Regalado. Risks associated with HIPPA compliance with the virtual visit was explained to the patient. Ms. Archana Regalado is at her residence and Dr. Ana Goel is in his office. HISTORY OF PRESENT ILLNESS:  Ms. Archana Regalado is a 28 y.o. female returns for a follow up visit for multiple medical problems. Her current presenting problems are No diagnosis found. .    As per information/history obtained from the PADT(patient assessment and documentation tool) - She complains of pain in the abdomen and lower back with radiation to the shoulders Bilateral, elbows Bilateral and hips Bilateral She rates the pain 7/10 and describes it as sharp, dull, burning, throbbing, shooting. Pain is made worse by: movement, lifting. Current treatment regimen has helped relieve about 20% of the pain. She denies side effects from the current pain regimen. Patient reports that since the last follow up visit the physical functioning is unchanged, family/social relationships are worse, mood is unchanged and sleep patterns are unchanged, and that the overall functioning is unchanged. Patient denies neurological bowel or bladder. Patient denies misusing/abusing her narcotic pain medications or using any illegal drugs. There are No indicators for possible drug abuse, addiction or diversion problems. Upon obtaining the medical history from Ms. Polly Pearce regarding today's office visit for her presenting problems, patient states she has been doing fair, but has been sick for a couple of weeks. She says she had issues with her insurance, has 2 weeks of medication. She mentions she had a dental procedure done, was given some sedation, but no pain medications. She complains her pelvic and and low back hurts the most. 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 ALLERGIES: Patients list of allergies were reviewed     MEDICATIONS: Ms. Polly Pearce list of medications were reviewed. Her current medications are   Outpatient Medications Prior to Visit   Medication Sig Dispense Refill    oxyCODONE-acetaminophen (PERCOCET) 5-325 MG per tablet Take 0.5 tablets by mouth every 6 hours as needed for Pain (max 1 per day) for up to 42 days. 42 tablet 0    gabapentin (NEURONTIN) 400 MG capsule Take 1 tab in the am, 1 tab in the afternoon and 2 tabs in the pm 120 capsule 1    nortriptyline (PAMELOR) 10 MG capsule Take 1-2 capsules by mouth nightly 60 capsule 0    ondansetron (ZOFRAN-ODT) 4 MG disintegrating tablet Take 1 tablet by mouth every 8 hours as needed for Nausea 90 tablet 2    Fluticasone-Salmeterol (AIRDUO RESPICLICK 253/82) 934-69 MCG/ACT AEPB Inhale 1 puff into the lungs 2 times daily 1 each 0    Probiotic Product (PROBIOTIC DAILY PO) Take by mouth daily      calcium carbonate (TUMS) 500 MG chewable tablet Take 1 tablet by mouth daily.  Multiple Vitamin (MULTI-VITAMIN DAILY PO) Take  by mouth.        No facility-administered medications prior to carbonate (TUMS) 500 MG chewable tablet Take 1 tablet by mouth daily.  Multiple Vitamin (MULTI-VITAMIN DAILY PO) Take  by mouth. No current facility-administered medications for this visit. I will continue her current medication regimen  which is part of the above treatment schedule. It has been helping with Ms. Aris Lockhart chronic  medical problems which for this visit include:   Diagnoses of Chronic pain syndrome, Fibromyalgia, Pelvic pain syndrome, Endometriosis, and Chronic pelvic pain in female were pertinent to this visit. Risks and benefits of the medications and other alternative treatments  including no treatment were discussed with the patient. The common side effects of these medications were also explained to the patient. Informed verbal consent was obtained. Goals of current treatment regimen include improvement in pain, restoration of functioning- with focus on improvement in physical performance, general activity, work or disability,emotional distress, health care utilization and  decreased medication consumption. Will continue to monitor progress towards achieving/maintaining therapeutic goals with special emphasis on  1. Improvement in perceived interfernce  of pain with ADL's. Ability to do home exercises independently. Ability to do household chores indoor and/or outdoor work and social and leisure activities. Improve psychosocial and physical functioning. - she is showing progression towards this treatment goal with the current regimen. She was advised against drinking alcohol with the narcotic pain medicines, advised against driving or handling machinery while adjusting the dose of medicines or if having cognitive  issues related to the current medications. Risk of overdose and death, if medicines not taken as prescribed, were also discussed. If the patient develops new symptoms or if the symptoms worsen, the patient should call the office.     While transcribing every attempt was made to maintain the accuracy of the note in terms of it's contents,there may have been some errors made inadvertently. Thank you for allowing me to participate in the care of this patient.     Jama Vaca MD.    Cc: Maria Valencia MD

## 2021-03-01 NOTE — TELEPHONE ENCOUNTER
Patient states her insurance informed her they will not pay for 45 days of her RX oxyCODONE-acetaminophen (PERCOCET) 5-325 MG and will need the directions changed in order to get insurance to pay. Pl's advise patient.

## 2021-03-02 ENCOUNTER — TELEPHONE (OUTPATIENT)
Dept: PAIN MANAGEMENT | Age: 36
End: 2021-03-02

## 2021-03-03 NOTE — TELEPHONE ENCOUNTER
Per [facility] ok to change sig to 1-2 daily. Pharmacy is aware and LVM let her know medication was fixed .

## 2021-03-11 DIAGNOSIS — N80.9 ENDOMETRIOSIS: ICD-10-CM

## 2021-03-11 DIAGNOSIS — M79.7 FIBROMYALGIA: ICD-10-CM

## 2021-03-11 DIAGNOSIS — N94.89 PELVIC PAIN SYNDROME: ICD-10-CM

## 2021-03-11 DIAGNOSIS — G89.29 CHRONIC PELVIC PAIN IN FEMALE: ICD-10-CM

## 2021-03-11 DIAGNOSIS — G89.4 CHRONIC PAIN SYNDROME: ICD-10-CM

## 2021-03-11 DIAGNOSIS — R10.2 CHRONIC PELVIC PAIN IN FEMALE: ICD-10-CM

## 2021-03-15 RX ORDER — GABAPENTIN 400 MG/1
CAPSULE ORAL
Qty: 120 CAPSULE | Refills: 1 | OUTPATIENT
Start: 2021-03-15

## 2021-04-04 DIAGNOSIS — G89.4 CHRONIC PAIN SYNDROME: ICD-10-CM

## 2021-04-12 ENCOUNTER — VIRTUAL VISIT (OUTPATIENT)
Dept: PAIN MANAGEMENT | Age: 36
End: 2021-04-12
Payer: COMMERCIAL

## 2021-04-12 DIAGNOSIS — M79.7 FIBROMYALGIA: ICD-10-CM

## 2021-04-12 DIAGNOSIS — G89.29 CHRONIC PELVIC PAIN IN FEMALE: ICD-10-CM

## 2021-04-12 DIAGNOSIS — R10.2 CHRONIC PELVIC PAIN IN FEMALE: ICD-10-CM

## 2021-04-12 DIAGNOSIS — N80.9 ENDOMETRIOSIS: ICD-10-CM

## 2021-04-12 DIAGNOSIS — G89.4 CHRONIC PAIN SYNDROME: ICD-10-CM

## 2021-04-12 DIAGNOSIS — N94.89 PELVIC PAIN SYNDROME: ICD-10-CM

## 2021-04-12 PROCEDURE — 99213 OFFICE O/P EST LOW 20 MIN: CPT | Performed by: INTERNAL MEDICINE

## 2021-04-12 RX ORDER — OXYCODONE HYDROCHLORIDE AND ACETAMINOPHEN 5; 325 MG/1; MG/1
0.5 TABLET ORAL EVERY 6 HOURS PRN
Qty: 42 TABLET | Refills: 0 | Status: SHIPPED | OUTPATIENT
Start: 2021-04-12 | End: 2021-05-21 | Stop reason: SDUPTHER

## 2021-04-12 NOTE — PROGRESS NOTES
TELE HEALTH VISIT (AUDIO-VISUAL)    Pursuant to the emergency declaration under the [facility] waiver authority and the [employer] Act, this Virtual  Visit was conducted, with patient's/legal guardian's consent, to reduce the patient's risk of exposure to COVID-19 and provide continuity of care for an established patient. Service is  provided through a video synchronous discussion virtually to substitute for in-person clinic visit. Due to this being a TeleHealth encounter (During [facility]-[address] [place] [address] emergency), evaluation of the following organ systems was limited: Vitals/Constitutional/EENT/Resp/CV/GI//MS/Neuro/Skin/Ecnc-Tuxa-Ueq. Darcy Torres  1985  <[id number]>    Ms. Cole Young is being seen virtually for a follow up visit using one of the following techniques  Google Duo, Face time or Doxy. me  Informed verbal consent to the virtual visit was obtained from Ms. Cole Young. Risks associated with HIPPA compliance with the virtual visit was explained to the patient. Ms. Cole Young is at her residence and Dr. Dick Mejias is in his office. HISTORY OF PRESENT ILLNESS:  Ms. Cole Young is a 28 y.o. female returns for a follow up visit for multiple medical problems. Her current presenting problems are   1. Chronic pain syndrome    2. Fibromyalgia    3. Pelvic pain syndrome    4. Endometriosis    . As per information/history obtained from the PADT(patient assessment and documentation tool) - She complains of pain in the lower back with radiation to the buttocks, hips Bilateral, upper leg Bilateral, knees Bilateral, lower leg Bilateral and pelvic area She rates the pain 7/10 and describes it as cramping, muscle spasms, sorness. Pain is made worse by: bending, lying down. Current treatment regimen has helped relieve about 30% of the pain.   She denies side effects from the current pain regimen. Patient reports that since the last follow up visit the physical functioning is worse, family/social relationships are better, mood is unchanged and sleep patterns are unchanged, and that the overall functioning is worse. Patient denies neurological bowel or bladder. Patient denies misusing/abusing her narcotic pain medications or using any illegal drugs. There are No indicators for possible drug abuse, addiction or diversion problems. Upon obtaining the medical history from Ms. Gabrielle Gaytan regarding today's office visit for her presenting problems, patient states she has been doing about the same. She says she is having increase pelvic pain. Patient states she has been using Neurontin along with Pamelor 10 mg. She states she has had increased flare ups of pain, she is still using Percocet 1 per day. Patient reports she is working part time still. ALLERGIES: Patients list of allergies were reviewed     MEDICATIONS: Ms. Gabrielle Gaytan list of medications were reviewed. Her current medications are   Outpatient Medications Prior to Visit   Medication Sig Dispense Refill    oxyCODONE-acetaminophen (PERCOCET) 5-325 MG per tablet Take 0.5 tablets by mouth every 6 hours as needed for Pain (max 1-2 per day) for up to 42 days. 42 tablet 0    gabapentin (NEURONTIN) 400 MG capsule Take 1 tab in the am, 1 tab in the afternoon and 2 tabs in the pm 120 capsule 1    nortriptyline (PAMELOR) 10 MG capsule Take 1-2 capsules by mouth nightly 60 capsule 0    ondansetron (ZOFRAN-ODT) 4 MG disintegrating tablet Take 1 tablet by mouth every 8 hours as needed for Nausea 90 tablet 2    Fluticasone-Salmeterol (AIRDUO RESPICLICK 107/84) 767-90 MCG/ACT AEPB Inhale 1 puff into the lungs 2 times daily 1 each 0    Probiotic Product (PROBIOTIC DAILY PO) Take by mouth daily      calcium carbonate (TUMS) 500 MG chewable tablet Take 1 tablet by mouth daily.  Multiple Vitamin (MULTI-VITAMIN DAILY PO) Take  by mouth. No facility-administered medications prior to visit. REVIEW OF SYSTEMS:    Respiratory: Negative for apnea, chest tightness and shortness of breath or change in baseline breathing. PHYSICAL EXAM:   Nursing note and vitals reviewed. There were no vitals taken for this visit. Constitutional: She appears well-developed and well-nourished. No acute distress. Cardiovascular: Normal rate, regular rhythm, normal heart sounds, and does not have murmur. Pulmonary/Chest: Effort normal. No respiratory distress. She does not have wheezes in the lung fields. She has no rales. Neurological/Psychiatric:She is alert and oriented to person, place, and time. Coordination is  normal.  Her mood isAppropriate and affect is Neutral/Euthymic(normal) . IMPRESSION:   1. Chronic pain syndrome    2. Fibromyalgia    3. Pelvic pain syndrome    4. Endometriosis    5. Chronic pelvic pain in female        PLAN:  Informed verbal consent was obtained  -[place] record was obtained and reviewed  for the last one year and no indicators of drug misuse  were found. Any other controlled substance prescriptions  seen on the record have been accounted for, I am aware of the patient receiving these medications. Daksha Schreiber [place] record will be rechecked as part of office protocol.    -Continue with current opioid regimen Percocet 1 per day as needed   -Adv Biofeedback, relaxation and meditation techniques. Referral to psychologist for CBT was also discussed with patient   -She was advised to increase fluids ( 5-7  glasses of fluid daily), limit caffeine, avoid cheese products, increase dietary fiber, increase activity and exercise as tolerated and relax regularly and enjoy meals   -Walking as tolerated 20-30 minutes daily    Current Outpatient Medications   Medication Sig Dispense Refill    oxyCODONE-acetaminophen (PERCOCET) 5-325 MG per tablet Take 0.5 tablets by mouth every 6 hours as needed for Pain (max 1-2 per day) for up to 42 days.  43 tablet 0    gabapentin (NEURONTIN) 400 MG capsule Take 1 tab in the am, 1 tab in the afternoon and 2 tabs in the pm 120 capsule 1    nortriptyline (PAMELOR) 10 MG capsule Take 1-2 capsules by mouth nightly 60 capsule 0    ondansetron (ZOFRAN-ODT) 4 MG disintegrating tablet Take 1 tablet by mouth every 8 hours as needed for Nausea 90 tablet 2    Fluticasone-Salmeterol (AIRDUO RESPICLICK 332/35) 687-76 MCG/ACT AEPB Inhale 1 puff into the lungs 2 times daily 1 each 0    Probiotic Product (PROBIOTIC DAILY PO) Take by mouth daily      calcium carbonate (TUMS) 500 MG chewable tablet Take 1 tablet by mouth daily.  Multiple Vitamin (MULTI-VITAMIN DAILY PO) Take  by mouth. No current facility-administered medications for this visit. I will continue her current medication regimen  which is part of the above treatment schedule. It has been helping with Ms. Parvin Parks chronic  medical problems which for this visit include:   Diagnoses of Chronic pain syndrome, Fibromyalgia, Pelvic pain syndrome, and Endometriosis were pertinent to this visit. Risks and benefits of the medications and other alternative treatments  including no treatment were discussed with the patient. The common side effects of these medications were also explained to the patient. Informed verbal consent was obtained. Goals of current treatment regimen include improvement in pain, restoration of functioning- with focus on improvement in physical performance, general activity, work or disability,emotional distress, health care utilization and  decreased medication consumption. Will continue to monitor progress towards achieving/maintaining therapeutic goals with special emphasis on  1. Improvement in perceived interfernce  of pain with ADL's. Ability to do home exercises independently. Ability to do household chores indoor and/or outdoor work and social and leisure activities. Improve psychosocial and physical functioning. - she is showing progression towards this treatment goal with the current regimen. She was advised against drinking alcohol with the narcotic pain medicines, advised against driving or handling machinery while adjusting the dose of medicines or if having cognitive  issues related to the current medications. Risk of overdose and death, if medicines not taken as prescribed, were also discussed. If the patient develops new symptoms or if the symptoms worsen, the patient should call the office. While transcribing every attempt was made to maintain the accuracy of the note in terms of it's contents,there may have been some errors made inadvertently. Thank you for allowing me to participate in the care of this patient.     Von Schwartz MD.    Cc: Aide Pineda MD

## 2021-04-14 ENCOUNTER — TELEPHONE (OUTPATIENT)
Dept: ADMINISTRATIVE | Age: 36
End: 2021-04-14

## 2021-04-19 RX ORDER — NORTRIPTYLINE HYDROCHLORIDE 10 MG/1
CAPSULE ORAL
Qty: 60 CAPSULE | Refills: 0 | Status: SHIPPED | OUTPATIENT
Start: 2021-04-19 | End: 2021-06-21 | Stop reason: SDUPTHER

## 2021-05-11 DIAGNOSIS — M79.7 FIBROMYALGIA: ICD-10-CM

## 2021-05-11 DIAGNOSIS — N80.9 ENDOMETRIOSIS: ICD-10-CM

## 2021-05-11 DIAGNOSIS — G89.4 CHRONIC PAIN SYNDROME: ICD-10-CM

## 2021-05-11 DIAGNOSIS — G89.29 CHRONIC PELVIC PAIN IN FEMALE: ICD-10-CM

## 2021-05-11 DIAGNOSIS — R10.2 CHRONIC PELVIC PAIN IN FEMALE: ICD-10-CM

## 2021-05-11 DIAGNOSIS — N94.89 PELVIC PAIN SYNDROME: ICD-10-CM

## 2021-05-11 RX ORDER — GABAPENTIN 400 MG/1
CAPSULE ORAL
Qty: 120 CAPSULE | Refills: 1 | Status: SHIPPED | OUTPATIENT
Start: 2021-05-11 | End: 2021-05-21 | Stop reason: SDUPTHER

## 2021-05-21 ENCOUNTER — VIRTUAL VISIT (OUTPATIENT)
Dept: PAIN MANAGEMENT | Age: 36
End: 2021-05-21
Payer: COMMERCIAL

## 2021-05-21 DIAGNOSIS — N94.89 PELVIC PAIN SYNDROME: ICD-10-CM

## 2021-05-21 DIAGNOSIS — G89.4 CHRONIC PAIN SYNDROME: ICD-10-CM

## 2021-05-21 DIAGNOSIS — R10.2 CHRONIC PELVIC PAIN IN FEMALE: ICD-10-CM

## 2021-05-21 DIAGNOSIS — N80.9 ENDOMETRIOSIS: ICD-10-CM

## 2021-05-21 DIAGNOSIS — G89.29 CHRONIC PELVIC PAIN IN FEMALE: ICD-10-CM

## 2021-05-21 DIAGNOSIS — M79.7 FIBROMYALGIA: ICD-10-CM

## 2021-05-21 PROCEDURE — 99213 OFFICE O/P EST LOW 20 MIN: CPT | Performed by: INTERNAL MEDICINE

## 2021-05-21 RX ORDER — OXYCODONE HYDROCHLORIDE AND ACETAMINOPHEN 5; 325 MG/1; MG/1
0.5 TABLET ORAL EVERY 6 HOURS PRN
Qty: 30 TABLET | Refills: 0 | Status: SHIPPED | OUTPATIENT
Start: 2021-05-21 | End: 2021-06-21 | Stop reason: SDUPTHER

## 2021-05-21 RX ORDER — GABAPENTIN 400 MG/1
CAPSULE ORAL
Qty: 120 CAPSULE | Refills: 1 | Status: SHIPPED | OUTPATIENT
Start: 2021-05-21 | End: 2021-06-21 | Stop reason: SDUPTHER

## 2021-05-21 NOTE — PROGRESS NOTES
TELE HEALTH VISIT (AUDIO-VISUAL)    Pursuant to the emergency declaration under the [facility] waiver authority and the [employer] Act, this Virtual  Visit was conducted, with patient's/legal guardian's consent, to reduce the patient's risk of exposure to COVID-19 and provide continuity of care for an established patient. Service is  provided through a video synchronous discussion virtually to substitute for in-person clinic visit. Due to this being a TeleHealth encounter ([facility]), evaluation of the following organ systems was limited: Vitals/Constitutional/EENT/Resp/CV/GI//MS/Neuro/Skin/Fdja-Msyo-Qso. Cher Torres  1985  <[id number]>    Ms. Zachary Brandon is being seen virtually for a follow up visit using one of the following techniques  Google Duo, Face time or Doxy. me  Informed verbal consent to the virtual visit was obtained from Ms. Zachary Brandon. Risks associated with HIPPA compliance with the virtual visit was explained to the patient. Ms. Zachary Brandon is at her residence and Dr. Prasanth Schmidt is in his office. HISTORY OF PRESENT ILLNESS:  Ms. Zachary Brandon is a 28 y.o. female returns for a follow up visit for multiple medical problems. Her current presenting problems are   1. Chronic pain syndrome    2. Fibromyalgia    3. Pelvic pain syndrome    4. Endometriosis    5. Chronic pelvic pain in female    . As per information/history obtained from the PADT(patient assessment and documentation tool) - She complains of pain in the lower back with radiation to the hips Bilateral, knees Bilateral and Pelvic region  She rates the pain 7/10 and describes it as aching, cramping. Pain is made worse by: walking, bending. Current treatment regimen has helped relieve about 30% of the pain. She denies side effects from the current pain regimen. Patient reports that since the last follow up visit the physical functioning is unchanged, family/social relationships are unchanged, mood is unchanged and sleep patterns are unchanged, and that the overall functioning is unchanged. Patient denies neurological bowel or bladder. Patient denies misusing/abusing her narcotic pain medications or using any illegal drugs. There are No indicators for possible drug abuse, addiction or diversion problems. Upon obtaining the medical history from Ms. Gabrielle Gaytan regarding today's office visit for her presenting problems, patient states she has been doing fair, her pain has been baseline, manageable with the medications. She mentions she is using Neurontin 1600 mg along with Percocet 1 per day or so. Patient denies any constipation symptoms. ALLERGIES: Patients list of allergies were reviewed     MEDICATIONS: Ms. Gabrielle Gaytan list of medications were reviewed. Her current medications are   Outpatient Medications Prior to Visit   Medication Sig Dispense Refill    gabapentin (NEURONTIN) 400 MG capsule TAKE 1 CAPSULE IN THE AM, 1 CAPSULE IN THE AFTERNOON AND 2 CAPSULES IN THE  capsule 1    nortriptyline (PAMELOR) 10 MG capsule TAKE 1 TO 2 CAPSULES BY MOUTH EVERY NIGHT 60 capsule 0    oxyCODONE-acetaminophen (PERCOCET) 5-325 MG per tablet Take 0.5 tablets by mouth every 6 hours as needed for Pain (max 1-2 per day) for up to 42 days. 42 tablet 0    ondansetron (ZOFRAN-ODT) 4 MG disintegrating tablet Take 1 tablet by mouth every 8 hours as needed for Nausea 90 tablet 2    Fluticasone-Salmeterol (AIRDUO RESPICLICK 688/32) 056-56 MCG/ACT AEPB Inhale 1 puff into the lungs 2 times daily 1 each 0    Probiotic Product (PROBIOTIC DAILY PO) Take by mouth daily      calcium carbonate (TUMS) 500 MG chewable tablet Take 1 tablet by mouth daily.  Multiple Vitamin (MULTI-VITAMIN DAILY PO) Take  by mouth. No facility-administered medications prior to visit.         REVIEW OF SYSTEMS: Respiratory: Negative for apnea, chest tightness and shortness of breath or change in baseline breathing. PHYSICAL EXAM:   Nursing note and vitals reviewed. There were no vitals taken for this visit. Constitutional: She appears well-developed and well-nourished. No acute distress. Cardiovascular: Normal rate, regular rhythm, normal heart sounds, and does not have murmur. Pulmonary/Chest: Effort normal. No respiratory distress. She does not have wheezes in the lung fields. She has no rales. Neurological/Psychiatric:She is alert and oriented to person, place, and time. Coordination is  normal.  Her mood isAppropriate and affect is Neutral/Euthymic(normal) . Her    IMPRESSION:   1. Chronic pain syndrome    2. Fibromyalgia    3. Pelvic pain syndrome    4. Endometriosis    5. Chronic pelvic pain in female        PLAN:  Informed verbal consent was obtained  -continue with current opioid regimen Percocet 1 per day  -She was advised to increase fluids ( 5-7  glasses of fluid daily), limit caffeine, avoid cheese products, increase dietary fiber, increase activity and exercise as tolerated and relax regularly and enjoy meals   -Continue with all other adjuvant medications as before      Current Outpatient Medications   Medication Sig Dispense Refill    gabapentin (NEURONTIN) 400 MG capsule TAKE 1 CAPSULE IN THE AM, 1 CAPSULE IN THE AFTERNOON AND 2 CAPSULES IN THE  capsule 1    nortriptyline (PAMELOR) 10 MG capsule TAKE 1 TO 2 CAPSULES BY MOUTH EVERY NIGHT 60 capsule 0    oxyCODONE-acetaminophen (PERCOCET) 5-325 MG per tablet Take 0.5 tablets by mouth every 6 hours as needed for Pain (max 1-2 per day) for up to 42 days.  42 tablet 0    ondansetron (ZOFRAN-ODT) 4 MG disintegrating tablet Take 1 tablet by mouth every 8 hours as needed for Nausea 90 tablet 2    Fluticasone-Salmeterol (AIRDUO RESPICLICK 658/54) 416-02 MCG/ACT AEPB Inhale 1 puff into the lungs 2 times daily 1 each 0    Probiotic Product (PROBIOTIC DAILY PO) Take by mouth daily      calcium carbonate (TUMS) 500 MG chewable tablet Take 1 tablet by mouth daily.  Multiple Vitamin (MULTI-VITAMIN DAILY PO) Take  by mouth. No current facility-administered medications for this visit. I will continue her current medication regimen  which is part of the above treatment schedule. It has been helping with Ms. Parvin Parks chronic  medical problems which for this visit include:   Diagnoses of Chronic pain syndrome, Fibromyalgia, Pelvic pain syndrome, Endometriosis, and Chronic pelvic pain in female were pertinent to this visit. Risks and benefits of the medications and other alternative treatments  including no treatment were discussed with the patient. The common side effects of these medications were also explained to the patient. Informed verbal consent was obtained. Goals of current treatment regimen include improvement in pain, restoration of functioning- with focus on improvement in physical performance, general activity, work or disability,emotional distress, health care utilization and  decreased medication consumption. Will continue to monitor progress towards achieving/maintaining therapeutic goals with special emphasis on  1. Improvement in perceived interfernce  of pain with ADL's. Ability to do home exercises independently. Ability to do household chores indoor and/or outdoor work and social and leisure activities. Improve psychosocial and physical functioning. - she is showing progression towards this treatment goal with the current regimen. 2. Ability to focus/concentrate at work and perform the duties required of her at work  Sit through a workday without lower extremity symptoms. Stand 30-60 minutes without lower extremity symptoms. Ability to lift up to 10-20 lbs. Ability to go up and down stairs. Sit 30-60 minutes  Without having to stand up frequently.  - she is maintaining/progressing towards her work related goals with the current regimen. She was advised against drinking alcohol with the narcotic pain medicines, advised against driving or handling machinery while adjusting the dose of medicines or if having cognitive  issues related to the current medications. Risk of overdose and death, if medicines not taken as prescribed, were also discussed. If the patient develops new symptoms or if the symptoms worsen, the patient should call the office. While transcribing every attempt was made to maintain the accuracy of the note in terms of it's contents,there may have been some errors made inadvertently. Thank you for allowing me to participate in the care of this patient.     Deborah Dennis MD.    Cc: Jennifer Bhakta MD

## 2021-06-21 ENCOUNTER — OFFICE VISIT (OUTPATIENT)
Dept: PAIN MANAGEMENT | Age: 36
End: 2021-06-21
Payer: COMMERCIAL

## 2021-06-21 ENCOUNTER — TELEPHONE (OUTPATIENT)
Dept: ADMINISTRATIVE | Age: 36
End: 2021-06-21

## 2021-06-21 VITALS
TEMPERATURE: 98.1 F | WEIGHT: 126 LBS | DIASTOLIC BLOOD PRESSURE: 75 MMHG | SYSTOLIC BLOOD PRESSURE: 115 MMHG | HEART RATE: 72 BPM | HEIGHT: 64 IN | BODY MASS INDEX: 21.51 KG/M2

## 2021-06-21 DIAGNOSIS — N80.9 ENDOMETRIOSIS: ICD-10-CM

## 2021-06-21 DIAGNOSIS — N94.89 PELVIC PAIN SYNDROME: ICD-10-CM

## 2021-06-21 DIAGNOSIS — R10.2 CHRONIC PELVIC PAIN IN FEMALE: ICD-10-CM

## 2021-06-21 DIAGNOSIS — G89.4 CHRONIC PAIN SYNDROME: ICD-10-CM

## 2021-06-21 DIAGNOSIS — G89.29 CHRONIC PELVIC PAIN IN FEMALE: ICD-10-CM

## 2021-06-21 DIAGNOSIS — M79.7 FIBROMYALGIA: ICD-10-CM

## 2021-06-21 PROCEDURE — 99213 OFFICE O/P EST LOW 20 MIN: CPT | Performed by: INTERNAL MEDICINE

## 2021-06-21 RX ORDER — OXYCODONE HYDROCHLORIDE AND ACETAMINOPHEN 5; 325 MG/1; MG/1
0.5 TABLET ORAL EVERY 6 HOURS PRN
Qty: 42 TABLET | Refills: 0 | Status: SHIPPED | OUTPATIENT
Start: 2021-06-21 | End: 2021-06-28 | Stop reason: SDUPTHER

## 2021-06-21 RX ORDER — NORTRIPTYLINE HYDROCHLORIDE 10 MG/1
CAPSULE ORAL
Qty: 60 CAPSULE | Refills: 1 | Status: SHIPPED | OUTPATIENT
Start: 2021-06-21 | End: 2021-09-13 | Stop reason: SDUPTHER

## 2021-06-21 RX ORDER — GABAPENTIN 400 MG/1
CAPSULE ORAL
Qty: 120 CAPSULE | Refills: 1 | Status: SHIPPED | OUTPATIENT
Start: 2021-06-21 | End: 2021-07-22

## 2021-06-21 NOTE — PROGRESS NOTES
Sury Ramírez Jilg  1985  <[id number]>      HISTORY OF PRESENT ILLNESS:  Ms. Miley Saunders is a 28 y.o. female returns for a follow up visit for pain management  She has a diagnosis of   1. Chronic pain syndrome    2. Fibromyalgia    3. Pelvic pain syndrome    4. Endometriosis    5. Chronic pelvic pain in female    . She complains of pain in the upper and low back, bilateral shoulders, elbows, knees and feet  She rates the pain 7/10 and describes it as sharp, aching. Current treatment regimen has helped relieve about 30% of the pain. She denies any side effects from the current pain regimen. Patient reports that since the last follow up visit the physical functioning is unchanged, family/social relationships are unchanged, mood is unchanged sleep patterns are unchanged, and that the overall functioning is unchanged. Patient denies misusing/abusing her narcotic pain medications or using any illegal drugs. There are No indicators for possible drug abuse, addiction or diversion problems. Patient reports she has been having some increase pain. She mentions she is using Pamelor along with Neurontin and Percocet. 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 She states he has been managing her ADLs and house chores. ALLERGIES: Patients list of allergies were reviewed     MEDICATIONS: Ms. Miley Saunders list of medications were reviewed. Her current medications are   Outpatient Medications Prior to Visit   Medication Sig Dispense Refill    gabapentin (NEURONTIN) 400 MG capsule TAKE 1 CAPSULE IN THE AM, 1 CAPSULE IN THE AFTERNOON AND 2 CAPSULES IN THE  capsule 1    nortriptyline (PAMELOR) 10 MG capsule TAKE 1 TO 2 CAPSULES BY MOUTH EVERY NIGHT 60 capsule 0    ondansetron (ZOFRAN-ODT) 4 MG disintegrating tablet Take 1 tablet by mouth every 8 hours as needed for Nausea 90 tablet 2    Fluticasone-Salmeterol (AIRDUO RESPICLICK 671/12) 824-25 MCG/ACT AEPB Inhale 1 puff into the lungs 2 times capsule TAKE 1 TO 2 CAPSULES BY MOUTH EVERY NIGHT 60 capsule 0    ondansetron (ZOFRAN-ODT) 4 MG disintegrating tablet Take 1 tablet by mouth every 8 hours as needed for Nausea 90 tablet 2    Fluticasone-Salmeterol (AIRDUO RESPICLICK 275/01) 843-35 MCG/ACT AEPB Inhale 1 puff into the lungs 2 times daily 1 each 0    Probiotic Product (PROBIOTIC DAILY PO) Take by mouth daily      calcium carbonate (TUMS) 500 MG chewable tablet Take 1 tablet by mouth daily.  Multiple Vitamin (MULTI-VITAMIN DAILY PO) Take  by mouth. No current facility-administered medications for this visit. I will continue her current medication regimen  which is part of the above treatment schedule. It has been helping with Ms. Sadie Nuno chronic  medical problems which for this visit include:   Diagnoses of Chronic pain syndrome, Fibromyalgia, Pelvic pain syndrome, Endometriosis, and Chronic pelvic pain in female were pertinent to this visit. Risks and benefits of the medications and other alternative treatments  including no treatment were discussed with the patient. The common side effects of these medications were also explained to the patient. Informed verbal consent was obtained. Goals of current treatment regimen include improvement in pain, restoration of functioning- with focus on improvement in physical performance, general activity, work or disability,emotional distress, health care utilization and  decreased medication consumption. Will continue to monitor progress towards achieving/maintaining therapeutic goals with special emphasis on  1. Improvement in perceived interfernce  of pain with ADL's. Ability to do home exercises independently. Ability to do household chores indoor and/or outdoor work and social and leisure activities. Improve psychosocial and physical functioning. - she is showing progression towards this treatment goal with the current regimen.      She was advised against drinking alcohol with the narcotic pain medicines, advised against driving or handling machinery while adjusting the dose of medicines or if having cognitive  issues related to the current medications. Risk of overdose and death, if medicines not taken as prescribed, were also discussed. If the patient develops new symptoms or if the symptoms worsen, the patient should call the office. While transcribing every attempt was made to maintain the accuracy of the note in terms of it's contents,there may have been some errors made inadvertently. Thank you for allowing me to participate in the care of this patient.     Fredrick Barrett MD.    Cc: Star Woo MD

## 2021-06-21 NOTE — TELEPHONE ENCOUNTER
Submitted PA for oxyCODONE-Acetaminophen 5-325MG OR TABS  Mayra Shah) - [phone]   Via CMM STATUS: PENDING

## 2021-06-22 NOTE — TELEPHONE ENCOUNTER
The medication is APPROVED, but she will have fill for seven days only since this is the the patient's first fill in the last 90 days. She will be able to fill 30 days after this first fill. The pharmacy was notified by  since they were not open yet. That will void the remainder of the RX. If this requires a response please respond to the pool ( [facility]). Thank you please advise patient.

## 2021-06-28 RX ORDER — OXYCODONE HYDROCHLORIDE AND ACETAMINOPHEN 5; 325 MG/1; MG/1
0.5 TABLET ORAL EVERY 6 HOURS PRN
Qty: 28 TABLET | Refills: 0 | Status: SHIPPED | OUTPATIENT
Start: 2021-06-28 | End: 2021-08-02 | Stop reason: SDUPTHER

## 2021-06-28 RX ORDER — OXYCODONE HYDROCHLORIDE AND ACETAMINOPHEN 5; 325 MG/1; MG/1
0.5 TABLET ORAL EVERY 6 HOURS PRN
Qty: 28 TABLET | Refills: 0 | Status: CANCELLED | OUTPATIENT
Start: 2021-06-28 | End: 2021-07-26

## 2021-06-28 NOTE — TELEPHONE ENCOUNTER
Patient called requesting her refill for Percocet, per previous notes.     Maxine / Mynor Bailey    Please advise

## 2021-07-19 DIAGNOSIS — R10.2 CHRONIC PELVIC PAIN IN FEMALE: ICD-10-CM

## 2021-07-19 DIAGNOSIS — M79.7 FIBROMYALGIA: ICD-10-CM

## 2021-07-19 DIAGNOSIS — N94.89 PELVIC PAIN SYNDROME: ICD-10-CM

## 2021-07-19 DIAGNOSIS — G89.4 CHRONIC PAIN SYNDROME: ICD-10-CM

## 2021-07-19 DIAGNOSIS — N80.9 ENDOMETRIOSIS: ICD-10-CM

## 2021-07-19 DIAGNOSIS — G89.29 CHRONIC PELVIC PAIN IN FEMALE: ICD-10-CM

## 2021-07-22 RX ORDER — GABAPENTIN 400 MG/1
CAPSULE ORAL
Qty: 120 CAPSULE | Refills: 0 | OUTPATIENT
Start: 2021-07-22 | End: 2021-08-02 | Stop reason: SDUPTHER

## 2021-08-02 ENCOUNTER — OFFICE VISIT (OUTPATIENT)
Dept: PAIN MANAGEMENT | Age: 36
End: 2021-08-02
Payer: COMMERCIAL

## 2021-08-02 VITALS
TEMPERATURE: 98.2 F | HEART RATE: 69 BPM | WEIGHT: 133 LBS | SYSTOLIC BLOOD PRESSURE: 111 MMHG | BODY MASS INDEX: 22.83 KG/M2 | DIASTOLIC BLOOD PRESSURE: 72 MMHG

## 2021-08-02 DIAGNOSIS — R10.2 CHRONIC PELVIC PAIN IN FEMALE: ICD-10-CM

## 2021-08-02 DIAGNOSIS — N94.89 PELVIC PAIN SYNDROME: ICD-10-CM

## 2021-08-02 DIAGNOSIS — G89.29 CHRONIC PELVIC PAIN IN FEMALE: ICD-10-CM

## 2021-08-02 DIAGNOSIS — M79.7 FIBROMYALGIA: ICD-10-CM

## 2021-08-02 DIAGNOSIS — G89.4 CHRONIC PAIN SYNDROME: ICD-10-CM

## 2021-08-02 DIAGNOSIS — N80.9 ENDOMETRIOSIS: ICD-10-CM

## 2021-08-02 PROCEDURE — 99214 OFFICE O/P EST MOD 30 MIN: CPT | Performed by: INTERNAL MEDICINE

## 2021-08-02 RX ORDER — METHOCARBAMOL 500 MG/1
500 TABLET, FILM COATED ORAL 2 TIMES DAILY
Qty: 60 TABLET | Refills: 1 | Status: SHIPPED | OUTPATIENT
Start: 2021-08-02 | End: 2021-09-13 | Stop reason: SDUPTHER

## 2021-08-02 RX ORDER — GABAPENTIN 400 MG/1
CAPSULE ORAL
Qty: 120 CAPSULE | Refills: 1 | Status: SHIPPED | OUTPATIENT
Start: 2021-08-02 | End: 2021-09-13 | Stop reason: SDUPTHER

## 2021-08-02 RX ORDER — OXYCODONE HYDROCHLORIDE AND ACETAMINOPHEN 5; 325 MG/1; MG/1
0.5 TABLET ORAL EVERY 6 HOURS PRN
Qty: 42 TABLET | Refills: 0 | Status: SHIPPED | OUTPATIENT
Start: 2021-08-02 | End: 2021-09-13 | Stop reason: SDUPTHER

## 2021-08-02 NOTE — PROGRESS NOTES
Iris Castaneda Ji  1985  <[id number]>    HISTORY OF PRESENT ILLNESS:  Ms. Laure Gonzáles is a 39 y.o. female returns for a follow up visit for multiple medical problems. Her  presenting problems are   1. Fibromyalgia    2. Pelvic pain syndrome    3. Chronic pelvic pain in female    4. Endometriosis    5. Chronic pain syndrome    6. Primary insomnia    . As per information/history obtained from the PADT(patient assessment and documentation tool) -  She complains of pain in the shoulders Bilateral, elbows Bilateral, mid back, lower back and knees Bilateral with radiation to the arms Bilateral, hands Bilateral, upper leg Bilateral and lower leg Bilateral She rates the pain 7/10 and describes it as sharp, aching, numbness. Pain is made worse by: sitting, lifting. Current treatment regimen has helped relieve about 30% of the pain. She denies side effects from the current pain regimen. Patient reports that since the last follow up visit the physical functioning is worse, family/social relationships are unchanged, mood is worse and sleep patterns are unchanged, and that the overall functioning is unchanged. Patient denies neurological bowel or bladder. Patient denies misusing/abusing her narcotic pain medications or using any illegal drugs. There are No indicators for possible drug abuse, addiction or diversion problems. Upon obtaining the medical history from Ms. Laure Gonzáles regarding today's office visit for her presenting problems, patient states she has been doing fair, pain has been baseline and tolerable somewhat with the medications. She complains she is getting bad spasms in the abdomen and Pelvic area. She reports she has not GI issues and is working full time. Patient states her sleep is fair. Has fairly normal sleep latency. Averages about 4-6 hours of sleep a night. Denies any signs of sleep apnea. Feels somewhat rested in the morning. She says she is using Pamelor as needed.  She reports she has been using Neurontin 1600 mgs. ALLERGIES/PAST MED/FAM/SOC HISTORY: Ms. Moises Villareal allergies, past medical, family and social history were reviewed in the chart. Ms. Moises Villareal current medications are   Outpatient Medications Prior to Visit   Medication Sig Dispense Refill    gabapentin (NEURONTIN) 400 MG capsule TAKE ONE CAPSULE BY MOUTH EVERY MORNING, 1 CAPSULE IN AFTERNOON AND 2 CAPSULES EVERY EVENING 120 capsule 0    nortriptyline (PAMELOR) 10 MG capsule TAKE 1 TO 2 CAPSULES BY MOUTH EVERY NIGHT 60 capsule 1    ondansetron (ZOFRAN-ODT) 4 MG disintegrating tablet Take 1 tablet by mouth every 8 hours as needed for Nausea 90 tablet 2    Fluticasone-Salmeterol (AIRDUO RESPICLICK 153/72) 744-22 MCG/ACT AEPB Inhale 1 puff into the lungs 2 times daily 1 each 0    Probiotic Product (PROBIOTIC DAILY PO) Take by mouth daily      calcium carbonate (TUMS) 500 MG chewable tablet Take 1 tablet by mouth daily.  Multiple Vitamin (MULTI-VITAMIN DAILY PO) Take  by mouth. No facility-administered medications prior to visit. REVIEW OF SYSTEMS: .   Respiratory: Negative for shortness of breath. Cardiovascular: Negative for chest pain, palpitations  Gastrointestinal: Negative for blood in stool, abdominal distention, nausea, vomiting, abdominal pain, diarrhea,constipation. Neurological: Negative for speech difficulty, weakness and light-headedness, dizziness, tremors, sleepiness  Psychiatric/Behavioral: Negative for suicidal ideas, hallucinations, behavioral problems, self-injury, decreased concentration/cognition, agitation, confusion. PHYSICAL EXAM:   Nursing note and vitals reviewed. /72   Pulse 69   Temp 98.2 °F (36.8 °C)   Wt 133 lb (60.3 kg)   BMI 22.83 kg/m²   General Appearance: Patient is well nourished, well developed, well groomed and in no acute distress. Skin: Skin is warm and dry, good turgor . No rash or lesions noted. She is not diaphoretic.      Pulmonary/Chest: Effort normal. No respiratory distress or use of accessory muscles. Auscultation revealing normal air entry. She does not have wheezes in the lung fields. She has no rales. Cardiovascular: Normal rate, regular rhythm, normal heart sounds, and does not have murmur. Exam reveals no gallop and no friction rub. Musculoskeletal / Extremities: Range of motion is normal. Gait is normal, assistive devices use: none. She exhibits edema: none, and no tenderness. Neurological/Psychiatric:She is alert and oriented to person, place, and time. Coordination is  normal.   Judgement and Insight is normal  Her mood is Appropriate and affect is Neutral/Euthymic(normal) . Her behavior is normal.   thought content normal.        IMPRESSION:     1. Chronic pelvic pain in female    2. Fibromyalgia    3. Pelvic pain syndrome    4. Endometriosis    5. Chronic pain syndrome        PLAN:  Informed verbal consent was obtained. -[place] record was obtained and reviewed  for the last one year and no indicators of drug misuse  were found. Any other controlled substance prescriptions  seen on the record have been accounted for, I am aware of the patient receiving these medications. Susan Sosa [place] record will be rechecked as part of office protocol.    -Continue with current opioid regimen Percocet 1 per day   -Start Robaxin 500 mg BID as needed for spasms.  -Continue with Neurontin 1600 mg   -Patient's urine drug screen results with GC/MS confirmation were obtained and reviewed and were negative for any illicit drugs. Prescribed medications were within acceptable range.   -she was advised proper sleep hygiene-told to avoid:use of caffeine or other stimulants after noon, alcohol use near bedtime, long or frequent naps during the day, erratic sleep schedule, heavy meals near bedtime, vigorous exercise near bedtime and use of electronic devices near bedtime   -Start Pamelor 25 mg 1-2 at night   Ms. Rey Bailey will be prescribed  the medications listed below which are for treatment of her presenting  medical problems which for this visit include:   Diagnoses of Fibromyalgia, Pelvic pain syndrome, Chronic pelvic pain in female, Endometriosis, Chronic pain syndrome, and Primary insomnia were pertinent to this visit. Medications/orders associated with this visit:    Current Outpatient Medications   Medication Sig Dispense Refill    gabapentin (NEURONTIN) 400 MG capsule TAKE ONE CAPSULE BY MOUTH EVERY MORNING, 1 CAPSULE IN AFTERNOON AND 2 CAPSULES EVERY EVENING 120 capsule 0    nortriptyline (PAMELOR) 10 MG capsule TAKE 1 TO 2 CAPSULES BY MOUTH EVERY NIGHT 60 capsule 1    ondansetron (ZOFRAN-ODT) 4 MG disintegrating tablet Take 1 tablet by mouth every 8 hours as needed for Nausea 90 tablet 2    Fluticasone-Salmeterol (AIRDUO RESPICLICK 641/15) 115-28 MCG/ACT AEPB Inhale 1 puff into the lungs 2 times daily 1 each 0    Probiotic Product (PROBIOTIC DAILY PO) Take by mouth daily      calcium carbonate (TUMS) 500 MG chewable tablet Take 1 tablet by mouth daily.  Multiple Vitamin (MULTI-VITAMIN DAILY PO) Take  by mouth. No current facility-administered medications for this visit. Goals of current treatment regimen include improvement in pain, restoration of functioning- with focus on improvement in physical performance, general activity, work or disability,emotional distress, health care utilization and  decreased medication consumption. Will continue to monitor progress towards achieving/maintaining therapeutic goals with special emphasis on  1. Improvement in perceived interfernce  of pain with ADL's. Ability to do home exercises independently. Ability to do household chores indoor and/or outdoor work and social and leisure activities. To increase flexibility/ROM, strength and endurance.  Improve psychosocial and physical functioning.- she is not showing any significant progress/or showing regression  towards this goal and reassessment and adjustment of goals/treatment have been made. 2. Improving sleep to 6-7 hours a night. Improve mood/ anxiety and depression symptoms such as crying spells, low energy, problems with concentration, motivation. - she is showing progression towards this treatment goal with the current regimen. 3. Reduction of reliance on opioid analgesia/more appropriate opioid use. - she is showing progression towards this treatment goal with the current regimen. Risks and benefits of the medications and other alternative treatments have been/were  discussed with the patient. Any questions on the  common side effects of these medications were also answered. She was advised against drinking alcohol with the narcotic pain medicines, advised against driving or handling machinery when  starting or adjusting the dose of medicines, feeling groggy or drowsy, or if having any cognitive issues related to the current medications. Sheis fully aware of the risk of overdose and death, if medicines are misused and not taken as prescribed. If she develops new symptoms or if the symptoms worsen, she was told to call the office. .  Thank you for allowing me to participate in the care of this patient.     Isabela Mock MD    Cc: Gautam Blanco MD

## 2021-09-13 ENCOUNTER — OFFICE VISIT (OUTPATIENT)
Dept: PAIN MANAGEMENT | Age: 36
End: 2021-09-13
Payer: COMMERCIAL

## 2021-09-13 VITALS
DIASTOLIC BLOOD PRESSURE: 72 MMHG | HEART RATE: 70 BPM | SYSTOLIC BLOOD PRESSURE: 107 MMHG | BODY MASS INDEX: 22.14 KG/M2 | WEIGHT: 129 LBS

## 2021-09-13 DIAGNOSIS — M79.7 FIBROMYALGIA: ICD-10-CM

## 2021-09-13 DIAGNOSIS — N94.89 PELVIC PAIN SYNDROME: ICD-10-CM

## 2021-09-13 DIAGNOSIS — N80.9 ENDOMETRIOSIS: ICD-10-CM

## 2021-09-13 DIAGNOSIS — R10.2 CHRONIC PELVIC PAIN IN FEMALE: ICD-10-CM

## 2021-09-13 DIAGNOSIS — G89.29 CHRONIC PELVIC PAIN IN FEMALE: ICD-10-CM

## 2021-09-13 DIAGNOSIS — G89.4 CHRONIC PAIN SYNDROME: ICD-10-CM

## 2021-09-13 PROCEDURE — 99213 OFFICE O/P EST LOW 20 MIN: CPT | Performed by: INTERNAL MEDICINE

## 2021-09-13 RX ORDER — NORTRIPTYLINE HYDROCHLORIDE 10 MG/1
10-20 CAPSULE ORAL NIGHTLY
Qty: 60 CAPSULE | Refills: 1 | Status: SHIPPED | OUTPATIENT
Start: 2021-09-13 | End: 2021-12-06 | Stop reason: SDUPTHER

## 2021-09-13 RX ORDER — GABAPENTIN 400 MG/1
CAPSULE ORAL
Qty: 120 CAPSULE | Refills: 1 | Status: SHIPPED | OUTPATIENT
Start: 2021-09-13 | End: 2021-10-25 | Stop reason: SDUPTHER

## 2021-09-13 RX ORDER — METHOCARBAMOL 500 MG/1
500 TABLET, FILM COATED ORAL 2 TIMES DAILY
Qty: 60 TABLET | Refills: 1 | Status: SHIPPED | OUTPATIENT
Start: 2021-09-13 | End: 2021-10-25 | Stop reason: SDUPTHER

## 2021-09-13 RX ORDER — OXYCODONE HYDROCHLORIDE AND ACETAMINOPHEN 5; 325 MG/1; MG/1
0.5 TABLET ORAL EVERY 6 HOURS PRN
Qty: 42 TABLET | Refills: 0 | Status: SHIPPED | OUTPATIENT
Start: 2021-09-13 | End: 2021-10-25 | Stop reason: SDUPTHER

## 2021-09-13 NOTE — PROGRESS NOTES
(ZOFRAN-ODT) 4 MG disintegrating tablet Take 1 tablet by mouth every 8 hours as needed for Nausea 90 tablet 2    Fluticasone-Salmeterol (AIRDUO RESPICLICK 455/12) 844-10 MCG/ACT AEPB Inhale 1 puff into the lungs 2 times daily 1 each 0    Probiotic Product (PROBIOTIC DAILY PO) Take by mouth daily      calcium carbonate (TUMS) 500 MG chewable tablet Take 1 tablet by mouth daily.  Multiple Vitamin (MULTI-VITAMIN DAILY PO) Take  by mouth.  gabapentin (NEURONTIN) 400 MG capsule Take 1 capsule po in AM, take 1 capsule po Afternoon, take 2 capsules po in  capsule 1     No facility-administered medications prior to visit. REVIEW OF SYSTEMS:    Respiratory: Negative for apnea, chest tightness and shortness of breath or change in baseline breathing. PHYSICAL EXAM:   Nursing note and vitals reviewed. /72   Pulse 70   Wt 129 lb (58.5 kg)   Breastfeeding No   BMI 22.14 kg/m²   Constitutional: She appears well-developed and well-nourished. No acute distress. Cardiovascular: Normal rate, regular rhythm, normal heart sounds, and does not have murmur. Pulmonary/Chest: Effort normal. No respiratory distress. She does not have wheezes in the lung fields. She has no rales. Neurological/Psychiatric:She is alert and oriented to person, place, and time. Coordination is  normal.  Her mood isAppropriate and affect is Neutral/Euthymic(normal) . Her    IMPRESSION:   1. Chronic pain syndrome    2. Endometriosis    3. Fibromyalgia    4. Chronic pelvic pain in female    5. Pelvic pain syndrome        PLAN:  Informed verbal consent was obtained  -Continue with current opioid regimen   -Continue with Percocet 1 per day  -Continue with Robaxin PRN  -she was advised  to avoid using too many OTC analgesics to control the headaches, avoid chocolates, increased caffeine, cheeses and MSG nitrite containing foods, cigarette smoking.  To avoid bright lights, strong smells and skipping meals.   -She was advised to increase fluids ( 5-7  glasses of fluid daily), limit caffeine, avoid cheese products, increase dietary fiber, increase activity and exercise as tolerated and relax regularly and enjoy meals      Current Outpatient Medications   Medication Sig Dispense Refill    oxyCODONE-acetaminophen (PERCOCET) 5-325 MG per tablet Take 0.5 tablets by mouth every 6 hours as needed for Pain (max 1-2 per day) for up to 42 days. 42 tablet 0    methocarbamol (ROBAXIN) 500 MG tablet Take 1 tablet by mouth 2 times daily 60 tablet 1    nortriptyline (PAMELOR) 10 MG capsule TAKE 1 TO 2 CAPSULES BY MOUTH EVERY NIGHT 60 capsule 1    ondansetron (ZOFRAN-ODT) 4 MG disintegrating tablet Take 1 tablet by mouth every 8 hours as needed for Nausea 90 tablet 2    Fluticasone-Salmeterol (AIRDUO RESPICLICK 200/62) 720-21 MCG/ACT AEPB Inhale 1 puff into the lungs 2 times daily 1 each 0    Probiotic Product (PROBIOTIC DAILY PO) Take by mouth daily      calcium carbonate (TUMS) 500 MG chewable tablet Take 1 tablet by mouth daily.  Multiple Vitamin (MULTI-VITAMIN DAILY PO) Take  by mouth.  gabapentin (NEURONTIN) 400 MG capsule Take 1 capsule po in AM, take 1 capsule po Afternoon, take 2 capsules po in  capsule 1     No current facility-administered medications for this visit. I will continue her current medication regimen  which is part of the above treatment schedule. It has been helping with Ms. Josselin Marroquin chronic  medical problems which for this visit include:   Diagnoses of Chronic pain syndrome, Endometriosis, Fibromyalgia, Chronic pelvic pain in female, and Pelvic pain syndrome were pertinent to this visit. Risks and benefits of the medications and other alternative treatments  including no treatment were discussed with the patient. The common side effects of these medications were also explained to the patient. Informed verbal consent was obtained.    Goals of current treatment regimen include improvement in pain, restoration of functioning- with focus on improvement in physical performance, general activity, work or disability,emotional distress, health care utilization and  decreased medication consumption. Will continue to monitor progress towards achieving/maintaining therapeutic goals with special emphasis on  1. Improvement in perceived interfernce  of pain with ADL's. Ability to do home exercises independently. Ability to do household chores indoor and/or outdoor work and social and leisure activities. Improve psychosocial and physical functioning. - she is showing progression towards this treatment goal with the current regimen. She was advised against drinking alcohol with the narcotic pain medicines, advised against driving or handling machinery while adjusting the dose of medicines or if having cognitive  issues related to the current medications. Risk of overdose and death, if medicines not taken as prescribed, were also discussed. If the patient develops new symptoms or if the symptoms worsen, the patient should call the office. While transcribing every attempt was made to maintain the accuracy of the note in terms of it's contents,there may have been some errors made inadvertently. Thank you for allowing me to participate in the care of this patient.     Stuart Fuchs MD.    Cc: Dhruv Wilkinson MD

## 2021-10-04 ENCOUNTER — CLINICAL DOCUMENTATION (OUTPATIENT)
Dept: PSYCHOLOGY | Age: 36
End: 2021-10-04

## 2021-10-04 ENCOUNTER — OFFICE VISIT (OUTPATIENT)
Dept: FAMILY MEDICINE CLINIC | Age: 36
End: 2021-10-04
Payer: COMMERCIAL

## 2021-10-04 VITALS
SYSTOLIC BLOOD PRESSURE: 122 MMHG | DIASTOLIC BLOOD PRESSURE: 70 MMHG | BODY MASS INDEX: 22.32 KG/M2 | HEIGHT: 63 IN | OXYGEN SATURATION: 99 % | HEART RATE: 86 BPM | WEIGHT: 126 LBS

## 2021-10-04 DIAGNOSIS — N80.9 ENDOMETRIOSIS: ICD-10-CM

## 2021-10-04 DIAGNOSIS — Z23 NEED FOR TDAP VACCINATION: ICD-10-CM

## 2021-10-04 DIAGNOSIS — Z00.00 ENCOUNTER FOR WELL ADULT EXAM WITHOUT ABNORMAL FINDINGS: ICD-10-CM

## 2021-10-04 DIAGNOSIS — Z00.00 WELL ADULT EXAM: ICD-10-CM

## 2021-10-04 DIAGNOSIS — Z71.85 VACCINE COUNSELING: ICD-10-CM

## 2021-10-04 DIAGNOSIS — Z23 NEED FOR PROPHYLACTIC VACCINATION AND INOCULATION AGAINST INFLUENZA: Primary | ICD-10-CM

## 2021-10-04 LAB
A/G RATIO: 1.6 (ref 1.1–2.2)
ALBUMIN SERPL-MCNC: 4.4 G/DL (ref 3.4–5)
ALP BLD-CCNC: 51 U/L (ref 40–129)
ALT SERPL-CCNC: 16 U/L (ref 10–40)
ANION GAP SERPL CALCULATED.3IONS-SCNC: 13 MMOL/L (ref 3–16)
AST SERPL-CCNC: 22 U/L (ref 15–37)
BASOPHILS ABSOLUTE: 0 K/UL (ref 0–0.2)
BASOPHILS RELATIVE PERCENT: 0.7 %
BILIRUB SERPL-MCNC: 0.3 MG/DL (ref 0–1)
BUN BLDV-MCNC: 6 MG/DL (ref 7–20)
CALCIUM SERPL-MCNC: 10 MG/DL (ref 8.3–10.6)
CHLORIDE BLD-SCNC: 105 MMOL/L (ref 99–110)
CHOLESTEROL, FASTING: 193 MG/DL (ref 0–199)
CO2: 23 MMOL/L (ref 21–32)
CREAT SERPL-MCNC: 0.6 MG/DL (ref 0.6–1.1)
EOSINOPHILS ABSOLUTE: 0.2 K/UL (ref 0–0.6)
EOSINOPHILS RELATIVE PERCENT: 3.2 %
GFR AFRICAN AMERICAN: >60
GFR NON-AFRICAN AMERICAN: >60
GLOBULIN: 2.7 G/DL
GLUCOSE BLD-MCNC: 88 MG/DL (ref 70–99)
HBV SURFACE AB TITR SER: <3.5 MIU/ML
HCT VFR BLD CALC: 43.1 % (ref 36–48)
HDLC SERPL-MCNC: 76 MG/DL (ref 40–60)
HEMOGLOBIN: 14.1 G/DL (ref 12–16)
LDL CHOLESTEROL CALCULATED: 104 MG/DL
LYMPHOCYTES ABSOLUTE: 2 K/UL (ref 1–5.1)
LYMPHOCYTES RELATIVE PERCENT: 32.8 %
MCH RBC QN AUTO: 30 PG (ref 26–34)
MCHC RBC AUTO-ENTMCNC: 32.7 G/DL (ref 31–36)
MCV RBC AUTO: 91.5 FL (ref 80–100)
MONOCYTES ABSOLUTE: 0.4 K/UL (ref 0–1.3)
MONOCYTES RELATIVE PERCENT: 6.3 %
NEUTROPHILS ABSOLUTE: 3.5 K/UL (ref 1.7–7.7)
NEUTROPHILS RELATIVE PERCENT: 57 %
PDW BLD-RTO: 13.7 % (ref 12.4–15.4)
PLATELET # BLD: 345 K/UL (ref 135–450)
PMV BLD AUTO: 8.1 FL (ref 5–10.5)
POTASSIUM SERPL-SCNC: 4.3 MMOL/L (ref 3.5–5.1)
RBC # BLD: 4.71 M/UL (ref 4–5.2)
SODIUM BLD-SCNC: 141 MMOL/L (ref 136–145)
TOTAL PROTEIN: 7.1 G/DL (ref 6.4–8.2)
TRIGLYCERIDE, FASTING: 63 MG/DL (ref 0–150)
TSH REFLEX: 4.17 UIU/ML (ref 0.27–4.2)
VLDLC SERPL CALC-MCNC: 13 MG/DL
WBC # BLD: 6.2 K/UL (ref 4–11)

## 2021-10-04 PROCEDURE — 36415 COLL VENOUS BLD VENIPUNCTURE: CPT | Performed by: FAMILY MEDICINE

## 2021-10-04 PROCEDURE — 90674 CCIIV4 VAC NO PRSV 0.5 ML IM: CPT | Performed by: FAMILY MEDICINE

## 2021-10-04 PROCEDURE — 90471 IMMUNIZATION ADMIN: CPT | Performed by: FAMILY MEDICINE

## 2021-10-04 PROCEDURE — 90715 TDAP VACCINE 7 YRS/> IM: CPT | Performed by: FAMILY MEDICINE

## 2021-10-04 PROCEDURE — 99395 PREV VISIT EST AGE 18-39: CPT | Performed by: FAMILY MEDICINE

## 2021-10-04 PROCEDURE — 90472 IMMUNIZATION ADMIN EACH ADD: CPT | Performed by: FAMILY MEDICINE

## 2021-10-04 NOTE — PROGRESS NOTES
Pt was met during a warm hand-off. Pt was referred by their PCP for PTSD. This writer [facility]. Pt indicated they would schedule an appointment. Pt was assisted in scheduling an appointment.

## 2021-10-05 LAB
ESTIMATED AVERAGE GLUCOSE: 102.5 MG/DL
HBA1C MFR BLD: 5.2 %
MEASLES IMMUNE (IGG): NORMAL
MUMPS AB IGG: NORMAL
RUBELLA ANTIBODY IGG: NORMAL
VARICELLA-ZOSTER VIRUS AB, IGG: NORMAL

## 2021-10-11 ENCOUNTER — TELEMEDICINE (OUTPATIENT)
Dept: PSYCHOLOGY | Age: 36
End: 2021-10-11
Payer: COMMERCIAL

## 2021-10-11 DIAGNOSIS — F32.A DEPRESSION, UNSPECIFIED DEPRESSION TYPE: ICD-10-CM

## 2021-10-11 DIAGNOSIS — F43.10 PTSD (POST-TRAUMATIC STRESS DISORDER): Primary | ICD-10-CM

## 2021-10-11 PROCEDURE — 90791 PSYCH DIAGNOSTIC EVALUATION: CPT | Performed by: PSYCHOLOGIST

## 2021-10-14 ASSESSMENT — ENCOUNTER SYMPTOMS
BACK PAIN: 0
SHORTNESS OF BREATH: 0
CHEST TIGHTNESS: 0
CONSTIPATION: 0
COLOR CHANGE: 0
EYE PAIN: 0
DIARRHEA: 0
RHINORRHEA: 0
TROUBLE SWALLOWING: 0

## 2021-10-14 NOTE — PROGRESS NOTES
Well Adult Note  Name: Julissa Moreno Date: 10/14/2021   MRN: <[id number]> Sex: Female   Age: 39 y.o. Ethnicity: Non- / Non    : 1985 Race:   Native [place]  14. / Corey Oviedos  Other      Garrison Neri is here for well adult exam.  History:  Presents for CPE. No complaints today. Review of Systems   Constitutional: Negative for fatigue and unexpected weight change. HENT: Negative for congestion, rhinorrhea and trouble swallowing. Eyes: Negative for pain and visual disturbance. Respiratory: Negative for chest tightness and shortness of breath. Cardiovascular: Negative for chest pain and palpitations. Gastrointestinal: Negative for constipation and diarrhea. Endocrine: Negative for cold intolerance and heat intolerance. Genitourinary: Negative for frequency and urgency. Musculoskeletal: Negative for arthralgias and back pain. Skin: Negative for color change and rash. Allergic/Immunologic: Negative for environmental allergies and food allergies. Neurological: Negative for dizziness and light-headedness. Hematological: Negative for adenopathy. Does not bruise/bleed easily. Psychiatric/Behavioral: Negative for dysphoric mood and sleep disturbance. Allergies   Allergen Reactions    Cymbalta [Duloxetine Hcl]      Fatigue, wt gain       Prior to Visit Medications    Medication Sig Taking? Authorizing Provider   oxyCODONE-acetaminophen (PERCOCET) 5-325 MG per tablet Take 0.5 tablets by mouth every 6 hours as needed for Pain (max 1-2 per day) for up to 42 days.  Yes Gustavo Loya MD   methocarbamol (ROBAXIN) 500 MG tablet Take 1 tablet by mouth 2 times daily Yes Gustavo Loya MD   nortriptyline (PAMELOR) 10 MG capsule Take 1-2 capsules by mouth nightly Yes Gustavo Loya MD   gabapentin (NEURONTIN) 400 MG capsule Take 1 capsule po in AM, take 1 capsule po Afternoon, take 2 capsules po in PM Yes Gustavo Loya MD   Probiotic Product (PROBIOTIC DAILY PO) Take by mouth daily Yes Historical Provider, MD   calcium carbonate (TUMS) 500 MG chewable tablet Take 1 tablet by mouth daily. Yes Historical Provider, MD   Multiple Vitamin (MULTI-VITAMIN DAILY PO) Take  by mouth. Yes Historical Provider, MD   ondansetron (ZOFRAN-ODT) 4 MG disintegrating tablet Take 1 tablet by mouth every 8 hours as needed for Nausea  Patient not taking: Reported on 10/4/2021  Melissa Subramanian MD   Fluticasone-Salmeterol (AIRDUO RESPICLICK 810/87) 869-10 MCG/ACT AEPB Inhale 1 puff into the lungs 2 times daily  Patient not taking: Reported on 10/4/2021  Melissa Subramanian MD       Past Medical History:   Diagnosis Date    Chronic myofascial pain     Dr. Placido Christopher for pain control    Chronic pelvic pain in female     pelvic floor dysfunction    Depression     Endometriosis     hx of lupron/depo; surgery. hx of OCP trials.  Seizure disorder [facility])     neurology (while in [place]). off seizure meds since 2010, stable. Past Surgical History:   Procedure Laterality Date    ABDOMEN SURGERY  2008, 2012    diagnostic laparascopy, scar tissue, endometriosis    LASIK      TYMPANOSTOMY TUBE PLACEMENT         Family History   Adopted: Yes       Social History     Tobacco Use    Smoking status: Former Smoker     Types: E-Cigarettes    Smokeless tobacco: Never Used    Tobacco comment: occasional eCIG   Vaping Use    Vaping Use: Some days   Substance Use Topics    Alcohol use: No     Alcohol/week: 0.0 standard drinks    Drug use: No       Objective   /70   Pulse 86   Ht 5' 3.25\" (1.607 m)   Wt 126 lb (57.2 kg)   LMP 09/21/2021   SpO2 99%   BMI 22.14 kg/m²   Wt Readings from Last 3 Encounters:   10/04/21 126 lb (57.2 kg)   09/13/21 129 lb (58.5 kg)   08/02/21 133 lb (60.3 kg)       Physical Exam  Vitals and nursing note reviewed. Constitutional:       Appearance: Normal appearance. She is well-developed. HENT:      Head: Normocephalic and atraumatic.       Right Ear: External ear normal.      Left Ear: External ear normal.      Nose: Nose normal.   Eyes:      Conjunctiva/sclera: Conjunctivae normal.      Pupils: Pupils are equal, round, and reactive to light. Cardiovascular:      Rate and Rhythm: Normal rate and regular rhythm. Heart sounds: Normal heart sounds. Pulmonary:      Effort: Pulmonary effort is normal.      Breath sounds: Normal breath sounds. Abdominal:      General: Bowel sounds are normal.      Palpations: Abdomen is soft. Musculoskeletal:         General: Normal range of motion. Cervical back: Normal range of motion and neck supple. Skin:     General: Skin is dry. Neurological:      Mental Status: She is alert and oriented to person, place, and time. Deep Tendon Reflexes: Reflexes are normal and symmetric. Psychiatric:         Behavior: Behavior normal.         Thought Content: Thought content normal.         Judgment: Judgment normal.           Assessment   Plan   1. Need for prophylactic vaccination and inoculation against influenza  -     INFLUENZA, MDCK QUADV, 2 YRS AND OLDER, IM, PF, PREFILL SYR OR SDV, 0.5ML (FLUCELVAX QUADV, PF)  2. Need for Tdap vaccination  -     Tdap (age 6y and older) IM (BOOSTRIX)  3. Vaccine counseling  -     Mumps, Measles, Rubella, and Varicella Zoster, IgG  -     HEPATITIS B SURFACE ANTIBODY  4. Well adult exam  -     Lipid, Fasting  -     HEMOGLOBIN A1C  -     COMPREHENSIVE METABOLIC PANEL  -     TSH with Reflex  -     CBC WITH AUTO DIFFERENTIAL  5.  Endometriosis  -     [address] [facility], DO, Gynecology, [facility]         Personalized Preventive Plan   Current Health Maintenance Status  Immunization History   Administered Date(s) Administered    COVID-19, Wilson Peter, PF, 30mcg/0.3mL 06/17/2021, 07/14/2021    Influenza, MDCK Quadv, IM, PF (Flucelvax 2 yrs and older) 10/04/2021    Tdap (Boostrix, Adacel) 10/04/2021        Health Maintenance   Topic Date Due    Hepatitis C screen  Never done    Varicella vaccine (1 of 2 - 2-dose childhood series) Never done    HIV screen  Never done    Cervical cancer screen  Never done    DTaP/Tdap/Td vaccine (2 - Td or Tdap) 10/04/2031    Flu vaccine  Completed    COVID-19 Vaccine  Completed    Hepatitis A vaccine  Aged Out    Hepatitis B vaccine  Aged Out    Hib vaccine  Aged Out    Meningococcal (ACWY) vaccine  Aged Out    Pneumococcal 0-64 years Vaccine  Aged Out     Recommendations for PostedIn Due: see orders and patient instructions/AVS.  .

## 2021-10-25 ENCOUNTER — TELEMEDICINE (OUTPATIENT)
Dept: PSYCHOLOGY | Age: 36
End: 2021-10-25
Payer: COMMERCIAL

## 2021-10-25 ENCOUNTER — OFFICE VISIT (OUTPATIENT)
Dept: PAIN MANAGEMENT | Age: 36
End: 2021-10-25
Payer: COMMERCIAL

## 2021-10-25 VITALS
HEART RATE: 72 BPM | BODY MASS INDEX: 22.67 KG/M2 | WEIGHT: 129 LBS | SYSTOLIC BLOOD PRESSURE: 110 MMHG | DIASTOLIC BLOOD PRESSURE: 69 MMHG

## 2021-10-25 DIAGNOSIS — N94.89 PELVIC PAIN SYNDROME: ICD-10-CM

## 2021-10-25 DIAGNOSIS — G89.4 CHRONIC PAIN SYNDROME: ICD-10-CM

## 2021-10-25 DIAGNOSIS — G89.29 CHRONIC PELVIC PAIN IN FEMALE: ICD-10-CM

## 2021-10-25 DIAGNOSIS — M79.7 FIBROMYALGIA: ICD-10-CM

## 2021-10-25 DIAGNOSIS — F32.A DEPRESSION, UNSPECIFIED DEPRESSION TYPE: Primary | ICD-10-CM

## 2021-10-25 DIAGNOSIS — R10.2 CHRONIC PELVIC PAIN IN FEMALE: ICD-10-CM

## 2021-10-25 DIAGNOSIS — F43.10 PTSD (POST-TRAUMATIC STRESS DISORDER): ICD-10-CM

## 2021-10-25 DIAGNOSIS — N80.9 ENDOMETRIOSIS: ICD-10-CM

## 2021-10-25 PROCEDURE — 99213 OFFICE O/P EST LOW 20 MIN: CPT | Performed by: INTERNAL MEDICINE

## 2021-10-25 PROCEDURE — 90832 PSYTX W PT 30 MINUTES: CPT | Performed by: PSYCHOLOGIST

## 2021-10-25 RX ORDER — GABAPENTIN 400 MG/1
CAPSULE ORAL
Qty: 120 CAPSULE | Refills: 1 | Status: SHIPPED | OUTPATIENT
Start: 2021-10-25 | End: 2021-12-06 | Stop reason: SDUPTHER

## 2021-10-25 RX ORDER — METHOCARBAMOL 500 MG/1
500 TABLET, FILM COATED ORAL 2 TIMES DAILY
Qty: 60 TABLET | Refills: 1 | Status: SHIPPED | OUTPATIENT
Start: 2021-10-25 | End: 2021-12-06 | Stop reason: SDUPTHER

## 2021-10-25 RX ORDER — OXYCODONE HYDROCHLORIDE AND ACETAMINOPHEN 5; 325 MG/1; MG/1
0.5 TABLET ORAL EVERY 6 HOURS PRN
Qty: 42 TABLET | Refills: 0 | Status: SHIPPED | OUTPATIENT
Start: 2021-10-25 | End: 2021-12-06 | Stop reason: SDUPTHER

## 2021-10-25 NOTE — PROGRESS NOTES
Immanuel Marte Jilg  1985  <[id number]>    HISTORY OF PRESENT ILLNESS:  Ms. Shelly Garcia is a 39 y.o. female returns for a follow up visit for multiple medical problems. Her current presenting problems are   1. Chronic pain syndrome    2. Endometriosis    3. Fibromyalgia    4. Pelvic pain syndrome    . As per information/history obtained from the PADT(patient assessment and documentation tool) - She complains of pain in the shoulders Bilateral, elbows Bilateral, upper back, lower back and knees Bilateral with radiation to the arms Bilateral, abdomen, ankles Bilateral and feet Bilateral She rates the pain 7/10 and describes it as sharp, aching, burning. Pain is made worse by: standing, lifting. Current treatment regimen has helped relieve about 30% of the pain. She denies side effects from the current pain regimen. Patient reports that since the last follow up visit the physical functioning is unchanged, family/social relationships are worse, mood is unchanged and sleep patterns are worse, and that the overall functioning is unchanged. Patient denies neurological bowel or bladder. Patient denies misusing/abusing her narcotic pain medications or using any illegal drugs. There are No indicators for possible drug abuse, addiction or diversion problems. Upon obtaining the medical history from Ms. Shelly Garcia regarding today's office visit for her presenting problems, patient states she is doing fair, her pain has been tolerable with the medications. Ms. Shelly Garcia reports she is working full time now. She states she had labs done recently. Patient is complaining of her pelvic and back hurting the most. She reports she has an appointment to see her GYN. Patient mentions she is using Percocet 1/2 BID PRN. ALLERGIES: Patients list of allergies were reviewed     MEDICATIONS: Ms. Shelly Garcia list of medications were reviewed. Her current medications are   Outpatient Medications Prior to Visit Medication Sig Dispense Refill    oxyCODONE-acetaminophen (PERCOCET) 5-325 MG per tablet Take 0.5 tablets by mouth every 6 hours as needed for Pain (max 1-2 per day) for up to 42 days. 42 tablet 0    methocarbamol (ROBAXIN) 500 MG tablet Take 1 tablet by mouth 2 times daily 60 tablet 1    nortriptyline (PAMELOR) 10 MG capsule Take 1-2 capsules by mouth nightly 60 capsule 1    ondansetron (ZOFRAN-ODT) 4 MG disintegrating tablet Take 1 tablet by mouth every 8 hours as needed for Nausea 90 tablet 2    Fluticasone-Salmeterol (AIRDUO RESPICLICK 282/60) 585-45 MCG/ACT AEPB Inhale 1 puff into the lungs 2 times daily 1 each 0    Probiotic Product (PROBIOTIC DAILY PO) Take by mouth daily      calcium carbonate (TUMS) 500 MG chewable tablet Take 1 tablet by mouth daily.  Multiple Vitamin (MULTI-VITAMIN DAILY PO) Take  by mouth.  gabapentin (NEURONTIN) 400 MG capsule Take 1 capsule po in AM, take 1 capsule po Afternoon, take 2 capsules po in  capsule 1     No facility-administered medications prior to visit. REVIEW OF SYSTEMS:    Respiratory: Negative for apnea, chest tightness and shortness of breath or change in baseline breathing. PHYSICAL EXAM:   Nursing note and vitals reviewed. /69   Pulse 72   Wt 129 lb (58.5 kg)   Breastfeeding No   BMI 22.67 kg/m²   Constitutional: She appears well-developed and well-nourished. No acute distress. Cardiovascular: Normal rate, regular rhythm, normal heart sounds, and does not have murmur. Pulmonary/Chest: Effort normal. No respiratory distress. She does not have wheezes in the lung fields. She has no rales. Neurological/Psychiatric:She is alert and oriented to person, place, and time. Coordination is  normal.  Her mood isAppropriate and affect is Neutral/Euthymic(normal) . Her    IMPRESSION:   1. Chronic pain syndrome    2. Endometriosis    3. Fibromyalgia    4. Pelvic pain syndrome    5.  Chronic pelvic pain in female PLAN:  Informed verbal consent was obtained  -ROM/Stretching exercises as advised   -Continue with Percocet 1/2 BID PRN   -Most recent labs were reviewed and are baseline,okay   -Continue with all other adjuvant medications as before   Current Outpatient Medications   Medication Sig Dispense Refill    oxyCODONE-acetaminophen (PERCOCET) 5-325 MG per tablet Take 0.5 tablets by mouth every 6 hours as needed for Pain (max 1-2 per day) for up to 42 days. 42 tablet 0    methocarbamol (ROBAXIN) 500 MG tablet Take 1 tablet by mouth 2 times daily 60 tablet 1    nortriptyline (PAMELOR) 10 MG capsule Take 1-2 capsules by mouth nightly 60 capsule 1    ondansetron (ZOFRAN-ODT) 4 MG disintegrating tablet Take 1 tablet by mouth every 8 hours as needed for Nausea 90 tablet 2    Fluticasone-Salmeterol (AIRDUO RESPICLICK 613/67) 884-18 MCG/ACT AEPB Inhale 1 puff into the lungs 2 times daily 1 each 0    Probiotic Product (PROBIOTIC DAILY PO) Take by mouth daily      calcium carbonate (TUMS) 500 MG chewable tablet Take 1 tablet by mouth daily.  Multiple Vitamin (MULTI-VITAMIN DAILY PO) Take  by mouth.  gabapentin (NEURONTIN) 400 MG capsule Take 1 capsule po in AM, take 1 capsule po Afternoon, take 2 capsules po in  capsule 1     No current facility-administered medications for this visit. I will continue her current medication regimen  which is part of the above treatment schedule. It has been helping with Ms. Jhon Salinas chronic  medical problems which for this visit include:   Diagnoses of Chronic pain syndrome, Endometriosis, Fibromyalgia, and Pelvic pain syndrome were pertinent to this visit. Risks and benefits of the medications and other alternative treatments  including no treatment were discussed with the patient. The common side effects of these medications were also explained to the patient. Informed verbal consent was obtained.    Goals of current treatment regimen include improvement in pain, restoration of functioning- with focus on improvement in physical performance, general activity, work or disability,emotional distress, health care utilization and  decreased medication consumption. Will continue to monitor progress towards achieving/maintaining therapeutic goals with special emphasis on  1. Improvement in perceived interfernce  of pain with ADL's. Ability to do home exercises independently. Ability to do household chores indoor and/or outdoor work and social and leisure activities. Improve psychosocial and physical functioning. - she is showing progression towards this treatment goal with the current regimen. She was advised against drinking alcohol with the narcotic pain medicines, advised against driving or handling machinery while adjusting the dose of medicines or if having cognitive  issues related to the current medications. Risk of overdose and death, if medicines not taken as prescribed, were also discussed. If the patient develops new symptoms or if the symptoms worsen, the patient should call the office. While transcribing every attempt was made to maintain the accuracy of the note in terms of it's contents,there may have been some errors made inadvertently. Thank you for allowing me to participate in the care of this patient.     Payal Naqvi MD.    Cc: Ernestina Flores MD

## 2021-10-25 NOTE — PROGRESS NOTES
Behavioral Health Consultation  Maxine Felix, Ph.D.  Psychologist  10/25/2021  2:07 PM EDT      Time spent with Patient: 25 minutes  This is patient's second [facility] appointment. Reason for Consult:    Chief Complaint   Patient presents with    Depression    Anxiety     Referring Provider: Thea Hancock MD  [id number] [place],  [place] [place]    Feedback given to PCP. TELEHEALTH VISIT -- Audio/Visual (During TZJHA-18 public health emergency)  }  Pursuant to the emergency declaration under the [facility], [facility] waiver authority and the [employer] Act, this Virtual Visit was conducted, with patient's consent, to reduce the patient's risk of exposure to COVID-19 and provide continuity of care for an established patient. Services were provided through a video synchronous discussion virtually to substitute for in-person clinic visit. Pt gave verbal informed consent to participate in telehealth services. Conducted a risk-benefit analysis and determined that the patient's presenting problems are consistent with the use of telepsychology. Determined that the patient has sufficient knowledge and skills in the use of technology enabling them to adequately benefit from telepsychology. It was determined that this patient was able to be properly treated without an in-person session. Patient verified that they were currently located at the [facility] address that was provided during registration.     Verified the following information:  Patient's identification: Yes  Patient location: [address] [place] [address]   Patient's call back number: [phone]   Patient's emergency contact's name and number, as well as permission to contact them if needed: Extended Emergency Contact Information  Primary Emergency Contact: Lizbeth Pool  Address: [address] [place] [address] [address] [facility] [address] Phone: [phone]  Relation: Parent     Provider location: [place]:  Patient reported that she had a period of increased pain since our last visit. Is insightful about the connection between her pain and emotional discomfort. Has been experiencing some flashbacks from her history. This is a relatively new symptom for her. Admits to anxiety about the symptoms, feels like she does not have control during the flashbacks. Mom  about 6 years ago. Patient feels she did not process this loss right away. Discussed family dynamic, impact on her anxiety now. Is uncomfortable when relationships are stable. She wonders about being co-dependent. Discussed relaxation training and recommended regular practice. O:  MSE:    Appearance: good hygiene   Attitude: cooperative and friendly  Consciousness: alert  Orientation: oriented to person, place, time, general circumstance  Memory: recent and remote memory intact  Attention/Concentration: intact during session  Psychomotor Activity:normal  Eye Contact: normal  Speech: normal rate and volume, well-articulated  Mood: anxious  Affect: anxious  Perception: within normal limits  Thought Content: all-or-none thinking and intrusive thoughts  Thought Process: logical, coherent and goal-directed  Insight: good  Judgment: intact  Ability to understand instructions: Yes  Ability to respond meaningfully: Yes  Morbid Ideation: no   Suicide Assessment: no suicidal ideation, plan, or intent  Homicidal Ideation: no    History:    Medications:   Current Outpatient Medications   Medication Sig Dispense Refill    oxyCODONE-acetaminophen (PERCOCET) 5-325 MG per tablet Take 0.5 tablets by mouth every 6 hours as needed for Pain (max 1-2 per day) for up to 42 days.  42 tablet 0    methocarbamol (ROBAXIN) 500 MG tablet Take 1 tablet by mouth 2 times daily 60 tablet 1    gabapentin (NEURONTIN) 400 MG capsule Take 1 capsule po in AM, take 1 capsule po Afternoon, take 2 capsules po in  capsule 1    nortriptyline (PAMELOR) 10 MG capsule Take 1-2 capsules by mouth nightly 60 capsule 1    ondansetron (ZOFRAN-ODT) 4 MG disintegrating tablet Take 1 tablet by mouth every 8 hours as needed for Nausea 90 tablet 2    Fluticasone-Salmeterol (AIRDUO RESPICLICK 684/50) 652-43 MCG/ACT AEPB Inhale 1 puff into the lungs 2 times daily 1 each 0    Probiotic Product (PROBIOTIC DAILY PO) Take by mouth daily      calcium carbonate (TUMS) 500 MG chewable tablet Take 1 tablet by mouth daily.  Multiple Vitamin (MULTI-VITAMIN DAILY PO) Take  by mouth. No current facility-administered medications for this visit. Social History:   Social History     Socioeconomic History    Marital status: Single     Spouse name: Not on file    Number of children: Not on file    Years of education: Not on file    Highest education level: Not on file   Occupational History    Not on file   Tobacco Use    Smoking status: Former Smoker     Types: E-Cigarettes    Smokeless tobacco: Never Used    Tobacco comment: occasional eCIG   Vaping Use    Vaping Use: Some days   Substance and Sexual Activity    Alcohol use: No     Alcohol/week: 0.0 standard drinks    Drug use: No    Sexual activity: Not Currently   Other Topics Concern    Not on file   Social History Narrative    Not on file     Social Determinants of Health     Financial Resource Strain:     Difficulty of Paying Living Expenses:    Food Insecurity:     Worried About Running Out of Food in the Last Year:     Ran Out of Food in the Last Year:    Transportation Needs:     Lack of Transportation (Medical):      Lack of Transportation (Non-Medical):    Physical Activity:     Days of Exercise per Week:     Minutes of Exercise per Session:    Stress:     Feeling of Stress :    Social Connections:     Frequency of Communication with Friends and Family:     Frequency of Social Gatherings with Friends and Family:     Attends [employer] Services:  Active Member of Clubs or Organizations:     Attends Club or Organization Meetings:     Marital Status:    Intimate Partner Violence:     Fear of Current or Ex-Partner:     Emotionally Abused:     Physically Abused:     Sexually Abused:      TOBACCO:   reports that she has quit smoking. Her smoking use included e-cigarettes. She has never used smokeless tobacco.  ETOH:   reports no history of alcohol use. Family History:   Family History   Adopted: Yes     A:  Patient engaged and cooperative. Denies SI. Insight and motivation are good. Diagnosis:    1. Depression, unspecified depression type    2. PTSD (post-traumatic stress disorder)          Diagnosis Date    Chronic myofascial pain     Dr. Payal Samuel for pain control    Chronic pelvic pain in female     pelvic floor dysfunction    Depression     Endometriosis     hx of lupron/depo; surgery. hx of OCP trials.  Seizure disorder [facility])     neurology (while in [place]). off seizure meds since 2010, stable. Plan:   Pt interventions:  Trained in relaxation strategies, Conducted functional assessment, [place]-setting to identify pt's primary goals for [facility] visit / overall health, Supportive techniques, Emphasized self-care as important for managing overall health, Identified relevant behavioral strategies for targeting anxiety including relaxation training, Emphasized importance of regular practice of relaxation strategies to target / promote reduced anxiety and Provided pt book recommendation.     Pt Behavioral Change Plan:   See Pt Instructions

## 2021-10-25 NOTE — PATIENT INSTRUCTIONS
1. Consider reading a book called \"The Body Keeps the Score. \"  2. Practice relaxation exercises daily, at bedtime if possible. You may want to set an alarm on your phone to remind you do to these. You could also download an darius to assist with your relaxation practice. Suggestions include \"Calm\" or \"Headspace. \"   3. Search YouTube for   4. Return to see Dr. Latha Orozco in 2 weeks. Deep Breathing     What Is Deep Breathing? Deep breathing involves using your diaphragm muscle to help bring about a state of physiological relaxation. The diaphragm is a large muscle that rests across the bottom of your rib cage. When you inhale, the diaphragm muscle drops, opening up space so air can come in. When watching someone do this it looks like your stomach is filling with air. This type of breathing helps activate the part of your nervous system that controls relaxation. It can lead to decreased heart rate, blood pressure, decreased muscle tension, and overall feelings of relaxation. Why Be Concerned With How Im Breathing?  To increase your awareness of the role that breathing plays in increased physical tension and in contributing to increasing your bodys stress response.  To lower your level of stress-related arousal and tension.  To give you a method of taking calm, relaxing breaths to break the cycle of increasing arousal during stressful situations. What Is the Best Way To Use Deep Breathing Exercises?  Use deep breathing frequently.  Take deep breaths at the first signs of stress, anxiety, physical tension, or other symptoms.  Schedule time for relaxation. My scheduled time for deep breathing will be bedtime. Relaxation:  Diaphragmatic Breathing             ______________________________________________________________________________    1. Sit in a comfortable position  2. Place one hand on your stomach and the other on your chest  3.   Try to breathe so that only your stomach rises and falls    As you inhale, concentrate on your chest remaining relatively still while your stomach rises. It may be helpful for you to imagine that your pants are too big and you need to push your stomach out to hold them up. When exhaling, allow your stomach to fall in and the air to fully escape. Inhale slowly. You may choose to hold the air in for about a second. Exhale slowly. Dont push the air out, but just let the natural pressure of your body slowly move it out. It is normal for this healthy method of breathing to feel a little awkward at first.  With practice, it will feel more natural.    4.  Take some deep breaths, concentrating on only moving your stomach. Hold each breath for 2 to 3 seconds before exhaling. 5.   Get your mind on your side      One other important factor in getting relaxed is your mind. Your mind and body are connected. The mind influences the body and the body influences the mind. What you do with your mind when you are trying to relax is very important. The key is to avoid thinking about stressful things. You can think about       Neutral things (e.g., counting, saying a word like calm or relax)    Pleasant things (e.g., imagining a pleasant place)    6. Return to regular breathing, continuing to breathe so that only your stomach moves. 7.  It is recommended that you practice 2 times per day, 10 minutes each time. Deep Breathing Exercises  Exercise 1:  The 4-7-8 (or Relaxing Breath) Exercise  This exercise is utterly simple, takes almost no time, requires no equipment and can be done anywhere. Although you can do the exercise in any position, sit with your back straight while learning the exercise. Place the tip of your tongue against the ridge of tissue just behind your upper front teeth, and keep it there through the entire exercise.  You will be exhaling through your mouth around your tongue; try pursing your lips slightly if this seems awkward.  Exhale completely through your mouth, making a whoosh sound.  Close your mouth and inhale quietly through your nose to a mental count of four.  Hold your breath for a count of seven.  Exhale completely through your mouth, making a whoosh sound to a count of eight.  This is one breath. Now inhale again and repeat the cycle three more times for a total of four breaths. Note that you always inhale quietly through your nose and exhale audibly through your mouth. The tip of your tongue stays in position the whole time. Exhalation takes twice as long as inhalation. The absolute time you spend on each phase is not important; the ratio of 4:7:8 is important. If you have trouble holding your breath, speed the exercise up but keep to the ratio of 4:7:8 for the three phases. With practice you can slow it all down and get used to inhaling and exhaling more and more deeply. This exercise is a natural tranquilizer for the nervous system. Unlike tranquilizing drugs, which are often effective when you first take them but then lose their power over time, this exercise is subtle when you first try it but gains in power with repetition and practice. Do it at least twice a day. You cannot do it too frequently. Do NOT do more than 4 breaths at one time for the first month of practice. Later, if you wish, you can extend it to eight breaths. If you feel a little lightheaded when you first breathe this way, do not be concerned; it will pass. Once you develop this technique by practicing it every day, it will be a very useful tool that you will always have with you. Use it whenever anything upsetting happens - before you react. Use it whenever you are aware of internal tension. Use it to help you fall asleep. This exercise cannot be recommended too highly. Everyone can benefit from it.     Exercise 2: Meditation exercise  Breath Counting  If you want to get a feel for this challenging work, try your hand at breath counting, a deceptively simple technique. Sit in a comfortable position with the spine straight and head inclined slightly forward. Gently close your eyes and take a few deep breaths. Then let the breath come naturally without trying to influence it. Ideally it will be quiet and slow, but depth and rhythm may vary.  To begin the exercise, count \"one\" to yourself as you exhale.  The next time you exhale, count \"two,\" and so on up to Anthony. \"   Then begin a new cycle, counting \"one\" on the next exhalation. Never count higher than \"five,\" and count only when you exhale. You will know your attention has wandered when you find yourself up to \"eight,\" \"12,\" even \"19. \"  Try to do 10 minutes of this form of meditation. Progressive Muscle Relaxation  Progressive Muscle Relaxation is a relaxation technique used to release stress. It can relax the muscles and lower blood pressure, heart rate, and respiration. Progressive Muscle Relaxation is the tensing and then relaxing each muscle group of the body, one group at a time. Though this technique is simple it may take several sessions before it is 'mastered.'   Some people prefer to listen to an audiotape (or CD or mp3) that guides one through progressive muscle relaxation. The scripts are usually about 20 minutes long. Progressive muscle relaxation may be done sitting or lying down. Tense up a group of muscles - tense hard but don't strain - and hold for about 5-10 seconds. Release the tension from the muscles all at once. Stay relaxed for 10 - 20 seconds. Some people prefer to count, for example:  Tense for count of 5  Release all at once  Rest for count of 10  . ..or  Tense for count of 10  Release all at once  Rest for count of 20  Pay close attention to the feeling of relaxation when you release the contracted muscles. When going through the muscle groups, some people start with the hands, others with the feet.  You may do one side of the body (hand, arm, leg, foot) at a time or do both sides at the same time. Listening to a prerecorded script that guides you through the process is helpful. Sample of Progressive Muscle Relaxation Exercise:   Hands - Clench fists  tense for 5, release, rest for 10      Right forearms and hands - Extend arm, elbow locked, and bend hand back at the wrist  tense for 5, release, rest for 10      Upper right arm - Bend arms at elbows and flex biceps  tense for 5, release, rest for 10     Forehead - wrinkle forehead into frown, tense, release, rest, and/or raise eyebrows  tense for 5, release, rest for 10      Eyes - close eyes tightly, hold and release  tense for 5, release, rest for 10      Mouth - press lips tightly together  tense for 5, release, rest for 10      Jaw - open mouth wide and stick out tongue  tense for 5, release, rest for 10      Buttocks - tense  tense for 5, release, rest for 10      Abdomen  tense for 5, release, rest for 10      Chest  tense for 5, release, rest for 10      Back - arch back  tense for 5, release, rest for 10      Neck and shoulders  tense for 5, release, rest for 10      Thighs  tense for 5, release, rest for 10      Lower legs and feet - Point toes toward shin  tense for 5, release, rest for 10      Feet - Point toes and curl them under  tense for 5, release, rest for 10     You may repeat relaxing and tensing muscle groups that have you have already done to relax them further.

## 2021-10-26 ENCOUNTER — TELEPHONE (OUTPATIENT)
Dept: PAIN MANAGEMENT | Age: 36
End: 2021-10-26

## 2021-10-26 NOTE — TELEPHONE ENCOUNTER
Submitted PA for [facility]  [facility] Key: [id number] STATUS: \" Approved from 10/26/2021 to 01/26/2022\". The patient was notified by .

## 2021-11-08 ENCOUNTER — TELEMEDICINE (OUTPATIENT)
Dept: PSYCHOLOGY | Age: 36
End: 2021-11-08
Payer: COMMERCIAL

## 2021-11-08 DIAGNOSIS — F32.A DEPRESSION, UNSPECIFIED DEPRESSION TYPE: ICD-10-CM

## 2021-11-08 DIAGNOSIS — F43.10 PTSD (POST-TRAUMATIC STRESS DISORDER): Primary | ICD-10-CM

## 2021-11-08 PROCEDURE — 90832 PSYTX W PT 30 MINUTES: CPT | Performed by: PSYCHOLOGIST

## 2021-11-08 NOTE — PROGRESS NOTES
Behavioral Health Consultation  Naty Paula, Ph.D.  Psychologist  11/8/2021  2:06 PM EST      Time spent with Patient: 25 minutes  This is patient's third [facility] appointment. Reason for Consult:    Chief Complaint   Patient presents with    Depression    Anxiety     Referring Provider: Jai Paul MD  [address] [facility]. [place],  KierstenTeresa Mina    Feedback given to PCP. TELEHEALTH VISIT -- Audio/Visual (During MIOFI-44 public health emergency)  }  Pursuant to the emergency declaration under the [facility] waiver authority and the [employer] Act, this Virtual Visit was conducted, with patient's consent, to reduce the patient's risk of exposure to COVID-19 and provide continuity of care for an established patient. Services were provided through a video synchronous discussion virtually to substitute for in-person clinic visit. Pt gave verbal informed consent to participate in telehealth services. Conducted a risk-benefit analysis and determined that the patient's presenting problems are consistent with the use of telepsychology. Determined that the patient has sufficient knowledge and skills in the use of technology enabling them to adequately benefit from telepsychology. It was determined that this patient was able to be properly treated without an in-person session. Patient verified that they were currently located at the [facility] address that was provided during registration.     Verified the following information:  Patient's identification: Yes  Patient location: [address] [place] [address]   Patient's call back number: [phone]   Patient's emergency contact's name and number, as well as permission to contact them if needed: Extended Emergency Contact Information  Primary Emergency Contact: Precious Kurtz  Address: [address] [place] [address], [address] [facility] [address] Phone: [phone]  Relation: Parent     Provider location: [address]:  Please note that DAMON Acevdeo was present for and participated in appointment today for training purposes. Pt consented to such. Patient reported that she has been able to practice relaxation a few times since last visit. Finds some relief after deep breathing, but feels tense again shortly thereafter. She has been experiencing increased pelvic pain in the last few weeks. PMR was also helpful, but only for a short period of time. Patient feels that she has been more introspective since starting work with [facility]. Feels that treatment is helpful. Identifies anxiety as a barrier to socializing. Reviewed social support as a tool for managing anxiety. Also discussed the role of avoidance in maintaining anxiety. Discussed recommendation to engage in specialty care. Patient remains hesitant, but asked about referrals, insurance, etc.    O:  MSE:    Appearance: good hygiene   Attitude: cooperative and friendly  Consciousness: alert  Orientation: oriented to person, place, time, general circumstance  Memory: recent and remote memory intact  Attention/Concentration: intact during session  Psychomotor Activity:normal  Eye Contact: normal  Speech: normal rate and volume, well-articulated  Mood: anxious  Affect: anxious  Perception: within normal limits  Thought Content: intrusive thoughts  Thought Process: logical, coherent and goal-directed  Insight: good  Judgment: intact  Ability to understand instructions: Yes  Ability to respond meaningfully: Yes  Morbid Ideation: no   Suicide Assessment: no suicidal ideation, plan, or intent  Homicidal Ideation: no    History:    Medications:   Current Outpatient Medications   Medication Sig Dispense Refill    oxyCODONE-acetaminophen (PERCOCET) 5-325 MG per tablet Take 0.5 tablets by mouth every 6 hours as needed for Pain (max 1-2 per day) for up to 42 days.  42 tablet 0    methocarbamol (ROBAXIN) 500 MG tablet Take 1 tablet by mouth 2 times daily 60 tablet 1    gabapentin (NEURONTIN) 400 MG capsule Take 1 capsule po in AM, take 1 capsule po Afternoon, take 2 capsules po in  capsule 1    nortriptyline (PAMELOR) 10 MG capsule Take 1-2 capsules by mouth nightly 60 capsule 1    ondansetron (ZOFRAN-ODT) 4 MG disintegrating tablet Take 1 tablet by mouth every 8 hours as needed for Nausea 90 tablet 2    Fluticasone-Salmeterol (AIRDUO RESPICLICK 228/85) 087-04 MCG/ACT AEPB Inhale 1 puff into the lungs 2 times daily 1 each 0    Probiotic Product (PROBIOTIC DAILY PO) Take by mouth daily      calcium carbonate (TUMS) 500 MG chewable tablet Take 1 tablet by mouth daily.  Multiple Vitamin (MULTI-VITAMIN DAILY PO) Take  by mouth. No current facility-administered medications for this visit. Social History:   Social History     Socioeconomic History    Marital status: Single     Spouse name: Not on file    Number of children: Not on file    Years of education: Not on file    Highest education level: Not on file   Occupational History    Not on file   Tobacco Use    Smoking status: Former Smoker     Types: E-Cigarettes    Smokeless tobacco: Never Used    Tobacco comment: occasional eCIG   Vaping Use    Vaping Use: Some days   Substance and Sexual Activity    Alcohol use: No     Alcohol/week: 0.0 standard drinks    Drug use: No    Sexual activity: Not Currently   Other Topics Concern    Not on file   Social History Narrative    Not on file     Social Determinants of Health     Financial Resource Strain:     Difficulty of Paying Living Expenses: Not on file   Food Insecurity:     Worried About Running Out of Food in the Last Year: Not on file    Asha of Food in the Last Year: Not on file   Transportation Needs:     Lack of Transportation (Medical): Not on file    Lack of Transportation (Non-Medical):  Not on file   Physical Activity:     Days of Exercise per Week: Not on file    Minutes of Exercise per Session: Not on file   Stress:     Feeling of Stress : Not on file   Social Connections:     Frequency of Communication with Friends and Family: Not on file    Frequency of Social Gatherings with Friends and Family: Not on file    Attends Catholic Services: Not on file    Active Member of Clubs or Organizations: Not on file    Attends Club or Organization Meetings: Not on file    Marital Status: Not on file   Intimate Partner Violence:     Fear of Current or Ex-Partner: Not on file    Emotionally Abused: Not on file    Physically Abused: Not on file    Sexually Abused: Not on file   Housing Stability:     Unable to Pay for Housing in the Last Year: Not on file    Number of [place] in the Last Year: Not on file    Unstable Housing in the Last Year: Not on file     TOBACCO:   reports that she has quit smoking. Her smoking use included e-cigarettes. She has never used smokeless tobacco.  ETOH:   reports no history of alcohol use. Family History:   Family History   Adopted: Yes     A:  Patient engaged and cooperative. Denies SI. Insight and motivation are good. Diagnosis:    1. PTSD (post-traumatic stress disorder)    2. Depression, unspecified depression type          Diagnosis Date    Chronic myofascial pain     Dr. Payal Samuel for pain control    Chronic pelvic pain in female     pelvic floor dysfunction    Depression     Endometriosis     hx of lupron/depo; surgery. hx of OCP trials.  Seizure disorder [facility])     neurology (while in [place]). off seizure meds since 2010, stable. Plan:  Pt interventions:  Trained in strategies for increasing balanced thinking, Discussed and set plan for behavioral activation, Conducted functional assessment, [place]-setting to identify pt's primary goals for [facility] visit / overall health, Supportive techniques, Emphasized self-care as important for managing overall health and Identified maladaptive thoughts.     Pt Behavioral Change Plan:   See Pt Instructions

## 2021-11-08 NOTE — PATIENT INSTRUCTIONS
1. Keep up the good work practicing relaxation on a regular basis. 2. Review handouts about negative automatic thoughts and strategies for challenging them. 3. Consider making plans to socialize before our next visit. 4. Return to see Dr. Larson January in 2 weeks. NEGATIVE AUTOMATIC THOUGHTS    1. Mind reading: You assume that you know what people think without having sufficient evidence of their thoughts. \"He thinks I'm a loser. \"  2. Fortune telling: You predict the future- that things will get worse or that there is danger ahead. \"I'll fail that exam\" and \"I won't get the job. \"  3. Catastrophizing: You believe that what has happened or will happen will be so awful and unbearable that you won't be able to stand it. \"It would be terrible if I failed. \"  4. Labeling: You assign global negative traits to yourself and others. \"Im a failure\" or \"He's a rotten person. \"  5. Discounting positives: You claim that the positives that you or others attain are trivial. \"That's what wives are supposed to do--so it doesn't count when she's nice to me. \" \"Those successes were easy, so they don't matter. \"  6. Negative filter: You focus almost exclusively on the negatives and seldom notice the positives. \"Look at all of the people who don't like me. \"  7. Overgeneralizing: You perceive a global pattern of negatives on the basis of a single incident. \"This generally happens to me. I seem to fail at a lot of things. \"  8. Dichotomous thinking: You view events, or people, in all-or-nothing terms. \"I get rejected by everyone\" or \"It was a waste of time. \"  9. Shoulds: You interpret events in terms of how things should be or should have gone rather than simply focusing on what is. \"I should do well. If I don't, then I'm a failure. \"  10. Personalizing: You attribute a disproportionate amount of the blame to yourself for negative events and fail to see that certain events are also caused by others. \"The marriage ended because I failed\"  6. Blaming: You focus on the other person as the source of your negative feelings and you refuse to take responsibility for changing yourself. \"She's to blame for the way I feel now\" or \"My parents caused all my problems. \"  12. Unfair comparisons: You interpret events in terms of standards that are unrealistic-for example, you focus primarily on others who do better than you and find yourself inferior in the comparison. \"She's more successful than I am\" or \"Others did better than I did on the test.\"  13. Regret orientation: You focus on the idea that you could have done better in the past, rather on what you can do better now. \"I could have had a better job if I had tried\". (best friends with the Shoulds)   15. What if?: You keep asking a series of questions about \"What if\" something happens and fail to be satisfied with any of the answers. \"Yeah, but what if I get anxious? Or what if I can't catch my breath? \"  15. Emotional reasoning: You let your feelings guide your interpretation of reality-for example, \"I feel depressed, therefore my marriage is not working out. \"  16. Inability to disconfirm: You reject any evidence or arguments that might contradict your negative thoughts. For example, when you have the thought \"I'm unlovable\", you reject as irrelevant any evidence that people like you. Consequently, your thought cannot be refuted. \"That's not the real issue. There are deeper problems. There are other factors. \"  17. Judgment Focus: You view yourself, others and events in terms of evaluations of good, bad or superior-inferior, rather than simply describing, accepting, or understanding. You are continually measuring yourself and others according to arbitrary standards, finding that you and others fall short. You are focused on the judgments of others as well as your own judgments of yourself. \"I didn't perform well in college\" or \"If I take up tennis, I won't do well\" or \"Look how successful she is.  I'm not successful\". Challenging Upsetting Thinking  ________________________________________________________________________  Examine your thoughts for key words:     1. must, need, got to, have to, should (unrealistic standards)   2. never, always, completely, totally, all everything, everyone (predictions / labeling)   3. awful, terrible, horrible, unbearable, disaster, worst ever (labeling / predictions)   4. jerk, slob, creep, hypocrite, bully, stupid (labels)    Dispute or question the accuracy of the questionable thoughts. 1. Am I upsetting myself unnecessarily? How can I see this another way? 2. Is my thinking working for or against me? How could I view this in a less    upsetting way? 3. What am I demanding must happen? What do I want or prefer, rather than need? 4. Am I making something too terrible? Is it really that awful? What would be so     terrible about that? 5. Am I labeling a person? What is the action that I dont like? 6. Whats untrue about my thoughts? How can I stick to the facts? Whats the proof    for what I am thinking or believing about this? 7. Am I using extreme, black-and-white language? What less extreme words might     be more accurate? 8. Am I fortune telling or mind reading in a way that gets me upset or unhappy? What are the odds (percent chance -- e.g., there is a 5% chance. ..) that it will     really turn out the way Im thinking or imagining? 9. What are my options in this situation? How would I like to respond? 10. Create more moderate, helpful, or realistic statements to replace the upsetting    ones. 11.   Have I had any experiences that show that this thought might not be completely true? 12.   If my best friend or someone I loved had this thought, what would I tell them? 15. If my best friend or someone I loved knew I was thinking this thought, what would they say to me?   What evidence would they point out to me that would suggest that my thought is not completely true? 14. Are there strengths in me or positives in the situation that I am ignoring? Am I underestimating my ability to cope with unfortunate circumstances? 15. When I am not feeling this way, do I think about this situation any differently? How?  16. Have I been in this type of situation before? What happened? What have I learned from prior experiences that could help me now? 17. Five years from now, if I look back on this situation, will I look at it any differently? Will I focus on any different part of my experience? 25. Am I blaming myself for something over which I do not have complete control? 1. General Questions to Challenge Alarming Thoughts. A.  Am I alarming myself unnecessarily? Can I see this another way?   B. What am I demanding must happen? What do I want rather than need? C. Am I rating something a catastrophe? Is it every bit that awful? D. Am I rating a type of person? What's the action I don't like?   E. What's untrue about my thoughts? How can I stick to the facts? 2.  Strategies to Change Alarming Evaluations. A.  Listen for the extreme or catastrophic rating words (horrible, terrible, disaster. awful) of an event, a rating which implies that things couldn't be worse and you will not be able to survive the event. B. Instead of using this extreme rating when it doesn't fully apply, think of the event in terms of degree of disappointment or inconvenience. Other words might better describe the relative severity of the event such as annoying, nuisance, irritating, unfortunate, unlucky, frustration, or problem. Skipper Pressman for the extreme or overly general rating of a person (loser, stupid, inconsiderate, pushy, selfish, jerk, incompetent), something which implies that there are good and bad people in the world and this person definitely is part of the bad group. D.   Focus your judgment more on the specific action as the problem rather than what you believe is the general type of person involved. Realize that you are on shaky ground whenever you think you can fairly and without a doubt categorize someone as totally fitting a particular type. It is much more relevant to think in terms of the actions  which someone did that you disagree with or you see as mistakes. This pertains to your rating of your self as well as the self of another. Examples:     Alarming Evaluation: Katey Isaac, trying to stay on this diet is terrible; I can't take it anymore. Reassuring Evaluation: Not being able to eat exactly what I used to sure feels frustrating sometimes, but I can manage. Alarming Evaluation: He just doesn't know what the hell he's doing. Besides that, he doesn't give a damn about anybody but himself. Reassuring Evaluation: I think he's making a mistake here by not involving the rest of the staff in this decision. 3.  Strategies to Change Alarming Expectations. Adalberto Guerrerok out the element of truth or the preference in your alarming expectation. B.  Remove the absolute demand words (must, should, need, have to) and replace them with words of preference (want to; would like; wish; it would be better if). C.  Check that your preference is reasonable considering the cost of it to your health, convenience, relationships, or your other priorities. Examples:     Alarming Expectation:  I must not ever make a mistake. Reassuring Expectation:  I want to do things well and reduce my mistakes. Alarming Expectation: You have to always treat me fairly. Reassuring Expectation: I would like you to do everything I think is right but realize you won't always see things my way. 4.  Strategies to Change Alarming Predictions. Adalberto Guerrerok out what you see as the alarming scenario. Cynthia Vinny yourself what actually are the odds of this entire scene taking place. If this is not really very likely.  Remind yourself of the more probable events. C.  Play out what your options could be and how you would like to respond should something like your \"what if\" scenario took place. Think about what you might have learned from similar situations before. Examples:     Alarming Prediction: What if they look bored during my talk? Reassuring Prediction: There will probably be times when not everyone will look attentive but that's pretty common and I still know I have something worthwhile in say. Alarming Prediction: If I don't get that job, I don't know what I'll do. Reassuring Prediction: It will be disappointing if I don't get this job but I have been disappointed before and been able to bounce back. Let me see what they have to say and plan from there.

## 2021-11-22 ENCOUNTER — TELEMEDICINE (OUTPATIENT)
Dept: PSYCHOLOGY | Age: 36
End: 2021-11-22
Payer: COMMERCIAL

## 2021-11-22 DIAGNOSIS — G89.4 CHRONIC PAIN SYNDROME: ICD-10-CM

## 2021-11-22 DIAGNOSIS — F43.10 PTSD (POST-TRAUMATIC STRESS DISORDER): Primary | ICD-10-CM

## 2021-11-22 DIAGNOSIS — F32.A DEPRESSION, UNSPECIFIED DEPRESSION TYPE: ICD-10-CM

## 2021-11-22 PROCEDURE — 90832 PSYTX W PT 30 MINUTES: CPT | Performed by: PSYCHOLOGIST

## 2021-11-22 NOTE — PROGRESS NOTES
[phone]  Relation: Parent     Provider location: [address]:  Patient reported that she has been stressed about schoolwork. Has been trying to focus on shorter lists to help improve productivity. She anticipates that her pain will flare in the next few weeks. Is focusing on being as active and productive as possible, in case her pain becomes problematic soon. Processed interaction of pain and stress. Patient asked about CBT and ways to implement between visits. Provided encouragement and validation for using relaxation practices regularly. O:  MSE:    Appearance: good hygiene   Attitude: cooperative and friendly  Consciousness: alert  Orientation: oriented to person, place, time, general circumstance  Memory: recent and remote memory intact  Attention/Concentration: intact during session  Psychomotor Activity:normal  Eye Contact: normal  Speech: normal rate and volume, well-articulated  Mood: anxious  Affect: dysphoric and anxious  Perception: within normal limits  Thought Content: all-or-none thinking and intrusive thoughts  Thought Process: logical, coherent and goal-directed  Insight: good  Judgment: intact  Ability to understand instructions: Yes  Ability to respond meaningfully: Yes  Morbid Ideation: no   Suicide Assessment: no suicidal ideation, plan, or intent  Homicidal Ideation: no    History:    Medications:   Current Outpatient Medications   Medication Sig Dispense Refill    oxyCODONE-acetaminophen (PERCOCET) 5-325 MG per tablet Take 0.5 tablets by mouth every 6 hours as needed for Pain (max 1-2 per day) for up to 42 days.  42 tablet 0    methocarbamol (ROBAXIN) 500 MG tablet Take 1 tablet by mouth 2 times daily 60 tablet 1    gabapentin (NEURONTIN) 400 MG capsule Take 1 capsule po in AM, take 1 capsule po Afternoon, take 2 capsules po in  capsule 1    nortriptyline (PAMELOR) 10 MG capsule Take 1-2 capsules by mouth nightly 60 capsule 1    ondansetron (ZOFRAN-ODT) 4 MG disintegrating tablet Take 1 tablet by mouth every 8 hours as needed for Nausea 90 tablet 2    Fluticasone-Salmeterol (AIRDUO RESPICLICK 868/41) 842-09 MCG/ACT AEPB Inhale 1 puff into the lungs 2 times daily 1 each 0    Probiotic Product (PROBIOTIC DAILY PO) Take by mouth daily      calcium carbonate (TUMS) 500 MG chewable tablet Take 1 tablet by mouth daily.  Multiple Vitamin (MULTI-VITAMIN DAILY PO) Take  by mouth. No current facility-administered medications for this visit. Social History:   Social History     Socioeconomic History    Marital status: Single     Spouse name: Not on file    Number of children: Not on file    Years of education: Not on file    Highest education level: Not on file   Occupational History    Not on file   Tobacco Use    Smoking status: Former Smoker     Types: E-Cigarettes    Smokeless tobacco: Never Used    Tobacco comment: occasional eCIG   Vaping Use    Vaping Use: Some days   Substance and Sexual Activity    Alcohol use: No     Alcohol/week: 0.0 standard drinks    Drug use: No    Sexual activity: Not Currently   Other Topics Concern    Not on file   Social History Narrative    Not on file     Social Determinants of Health     Financial Resource Strain:     Difficulty of Paying Living Expenses: Not on file   Food Insecurity:     Worried About Running Out of Food in the Last Year: Not on file    Asha of Food in the Last Year: Not on file   Transportation Needs:     Lack of Transportation (Medical): Not on file    Lack of Transportation (Non-Medical):  Not on file   Physical Activity:     Days of Exercise per Week: Not on file    Minutes of Exercise per Session: Not on file   Stress:     Feeling of Stress : Not on file   Social Connections:     Frequency of Communication with Friends and Family: Not on file    Frequency of Social Gatherings with Friends and Family: Not on file    Attends Nondenominational Services: Not on file   [employer] of [employer]s or Organizations: Not on file    Attends Club or Organization Meetings: Not on file    Marital Status: Not on file   Intimate Partner Violence:     Fear of Current or Ex-Partner: Not on file    Emotionally Abused: Not on file    Physically Abused: Not on file    Sexually Abused: Not on file   Housing Stability:     Unable to Pay for Housing in the Last Year: Not on file    Number of [place] in the Last Year: Not on file    Unstable Housing in the Last Year: Not on file     TOBACCO:   reports that she has quit smoking. Her smoking use included e-cigarettes. She has never used smokeless tobacco.  ETOH:   reports no history of alcohol use. Family History:   Family History   Adopted: Yes     A:  Patient engaged and cooperative. Denies SI. Insight and motivation are good. Diagnosis:    1. PTSD (post-traumatic stress disorder)    2. Depression, unspecified depression type          Diagnosis Date    Chronic myofascial pain     Dr. Placido Christopher for pain control    Chronic pelvic pain in female     pelvic floor dysfunction    Depression     Endometriosis     hx of lupron/depo; surgery. hx of OCP trials.  Seizure disorder [facility])     neurology (while in [place]). off seizure meds since 2010, stable. Plan:  Pt interventions:  Discussed use of imagery, distractions, relaxation, mood management, communication training, questioning unhelpful thinking, problem-solving, and behavioral activation to manage pain, Conducted functional assessment, [place]-setting to identify pt's primary goals for [facility] visit / overall health, Supportive techniques, Emphasized self-care as important for managing overall health and Identified maladaptive thoughts.     Pt Behavioral Change Plan:   See Pt Instructions

## 2021-11-22 NOTE — PATIENT INSTRUCTIONS
by others. \"The marriage ended because I failed\"  6. Blaming: You focus on the other person as the source of your negative feelings and you refuse to take responsibility for changing yourself. \"She's to blame for the way I feel now\" or \"My parents caused all my problems. \"  12. Unfair comparisons: You interpret events in terms of standards that are unrealistic-for example, you focus primarily on others who do better than you and find yourself inferior in the comparison. \"She's more successful than I am\" or \"Others did better than I did on the test.\"  13. Regret orientation: You focus on the idea that you could have done better in the past, rather on what you can do better now. \"I could have had a better job if I had tried\". (best friends with the [employer])   15. What if?: You keep asking a series of questions about \"What if\" something happens and fail to be satisfied with any of the answers. \"Yeah, but what if I get anxious? Or what if I can't catch my breath? \"  15. Emotional reasoning: You let your feelings guide your interpretation of reality-for example, \"I feel depressed, therefore my marriage is not working out. \"  16. Inability to disconfirm: You reject any evidence or arguments that might contradict your negative thoughts. For example, when you have the thought \"I'm unlovable\", you reject as irrelevant any evidence that people like you. Consequently, your thought cannot be refuted. \"That's not the real issue. There are deeper problems. There are other factors. \"  17. Judgment Focus: You view yourself, others and events in terms of evaluations of good, bad or superior-inferior, rather than simply describing, accepting, or understanding. You are continually measuring yourself and others according to arbitrary standards, finding that you and others fall short. You are focused on the judgments of others as well as your own judgments of yourself.  \"I didn't perform well in college\" or \"If I take up tennis, I won't do well\" or \"Look how successful she is. I'm not successful\". Challenging Upsetting Thinking  ________________________________________________________________________  Examine your thoughts for key words:     1. must, need, got to, have to, should (unrealistic standards)   2. never, always, completely, totally, all everything, everyone (predictions / labeling)   3. awful, terrible, horrible, unbearable, disaster, worst ever (labeling / predictions)   4. jerk, slob, creep, hypocrite, bully, stupid (labels)    Dispute or question the accuracy of the questionable thoughts. 1. Am I upsetting myself unnecessarily? How can I see this another way? 2. Is my thinking working for or against me? How could I view this in a less    upsetting way? 3. What am I demanding must happen? What do I want or prefer, rather than need? 4. Am I making something too terrible? Is it really that awful? What would be so     terrible about that? 5. Am I labeling a person? What is the action that I dont like? 6. Whats untrue about my thoughts? How can I stick to the facts? Whats the proof    for what I am thinking or believing about this? 7. Am I using extreme, black-and-white language? What less extreme words might     be more accurate? 8. Am I fortune telling or mind reading in a way that gets me upset or unhappy? What are the odds (percent chance -- e.g., there is a 5% chance. ..) that it will     really turn out the way Im thinking or imagining? 9. What are my options in this situation? How would I like to respond? 10. Create more moderate, helpful, or realistic statements to replace the upsetting    ones. 11.   Have I had any experiences that show that this thought might not be completely true? 12.   If my best friend or someone I loved had this thought, what would I tell them? 15. If my best friend or someone I loved knew I was thinking this thought, what would they say to me?   What evidence would they point out to me that would suggest that my thought is not completely true? 14. Are there strengths in me or positives in the situation that I am ignoring? Am I underestimating my ability to cope with unfortunate circumstances? 15. When I am not feeling this way, do I think about this situation any differently? How?  16. Have I been in this type of situation before? What happened? What have I learned from prior experiences that could help me now? 17. Five years from now, if I look back on this situation, will I look at it any differently? Will I focus on any different part of my experience? 25. Am I blaming myself for something over which I do not have complete control? 1. General Questions to Challenge Alarming Thoughts. A.  Am I alarming myself unnecessarily? Can I see this another way?   B. What am I demanding must happen? What do I want rather than need? C. Am I rating something a catastrophe? Is it every bit that awful? D. Am I rating a type of person? What's the action I don't like?   E. What's untrue about my thoughts? How can I stick to the facts? 2.  Strategies to Change Alarming Evaluations. A.  Listen for the extreme or catastrophic rating words (horrible, terrible, disaster. awful) of an event, a rating which implies that things couldn't be worse and you will not be able to survive the event. B. Instead of using this extreme rating when it doesn't fully apply, think of the event in terms of degree of disappointment or inconvenience. Other words might better describe the relative severity of the event such as annoying, nuisance, irritating, unfortunate, unlucky, frustration, or problem. [place] for the extreme or overly general rating of a person (loser, stupid, inconsiderate, pushy, selfish, jerk, incompetent), something which implies that there are good and bad people in the world and this person definitely is part of the bad group. D.   Focus your judgment more on the specific action as the problem rather than what you believe is the general type of person involved. Realize that you are on shaky ground whenever you think you can fairly and without a doubt categorize someone as totally fitting a particular type. It is much more relevant to think in terms of the actions  which someone did that you disagree with or you see as mistakes. This pertains to your rating of your self as well as the self of another. Examples:     Alarming Evaluation: Ash[place]quirino Nguyễnroy, trying to stay on this diet is terrible; I can't take it anymore. Reassuring Evaluation: Not being able to eat exactly what I used to sure feels frustrating sometimes, but I can manage. Alarming Evaluation: He just doesn't know what the hell he's doing. Besides that, he doesn't give a damn about anybody but himself. Reassuring Evaluation: I think he's making a mistake here by not involving the rest of the staff in this decision. 3.  Strategies to Change Alarming Expectations. Eleni Willswill out the element of truth or the preference in your alarming expectation. B.  Remove the absolute demand words (must, should, need, have to) and replace them with words of preference (want to; would like; wish; it would be better if). C.  Check that your preference is reasonable considering the cost of it to your health, convenience, relationships, or your other priorities. Examples:     Alarming Expectation:  I must not ever make a mistake. Reassuring Expectation:  I want to do things well and reduce my mistakes. Alarming Expectation: You have to always treat me fairly. Reassuring Expectation: I would like you to do everything I think is right but realize you won't always see things my way. 4.  Strategies to Change Alarming Predictions. Eleni Willswill out what you see as the alarming scenario. Pierce Krill yourself what actually are the odds of this entire scene taking place.   If this is not really very likely. Remind yourself of the more probable events. C.  Play out what your options could be and how you would like to respond should something like your \"what if\" scenario took place. Think about what you might have learned from similar situations before. Examples:     Alarming Prediction: What if they look bored during my talk? Reassuring Prediction: There will probably be times when not everyone will look attentive but that's pretty common and I still know I have something worthwhile in say. Alarming Prediction: If I don't get that job, I don't know what I'll do. Reassuring Prediction: It will be disappointing if I don't get this job but I have been disappointed before and been able to bounce back. Let me see what they have to say and plan from there. Understanding Chronic Pain   Chronic pain is best understood as an interaction of numerous factors. Many aspects of these factors can be addressed to help manage chronic pain conditions. Factor  What you can do to improve pain management    Physical     Keep muscles toned through physical activity.  Take prescribed pain medication.  Use relaxation techniques to relax muscles and control the stress response. Emotional     Use relaxation to control anxiety.  Stay involved with relationships and enjoyable activities to protect against depression and other negative moods. Cognitive     Recognize unhealthy thinking patterns that interfere with adaptive coping with pain.  Challenge faulty thinking and replace it with healthy thoughts. Behavior     Stay physically active.  Pace your activities; avoid a cycle of overactivity and underactivity.  Adhere to medical recommendations (including medications and physical therapy). Social     Discuss what you find helpful and not helpful with family and others who are close to you.  Stay socially involved.              Five Steps for Managing Intense Pain Episodes Unhelpful self-talk Helpful self-talk   I cant stand this pain. (Underestimating ability.)  Ada dealt with it before; I can get through it again. (Acknowledging ability to tolerate pain.)    This pain is horrible, awful, terrible. (Emotional evaluations.)  This pain is an 8 on a scale of 1 to 10. ([place], unemotional evaluation method.)    This pain is ruining my life. (Global assessment.)  This is a difficult time for me. (Specific assessment.)    I cant do anything to make this stop. (All or nothing thinking.)  There are things I can do to get through this. (Avoiding all or nothing thinking.)          1. Manage your thinking. What do you think before pain episodes? What do you think during pain episodes? What do you think following pain episodes? Which of these thoughts are helpful in managing your pain? Which are unhelpful? How can you alter your thinking to make it more helpful? 2. Stay as relaxed as possible. Use deep breathing when you first feel an increase in pain; continue using deep breathing throughout the pain episode. 3. Use imagery and distraction. Use relaxing imagery, watch television, listen to music,   do a mentally challenging puzzle or game. 4. Use medications effectively. Recognize early warning signs of increased pain. Take medication early to help manage pain episodes better. 5. Use your support network. Talk with your family members or others about what they can do or say to help during a pain episode. Let them know what is not helpful during a pain episode.

## 2021-11-24 DIAGNOSIS — G89.4 CHRONIC PAIN SYNDROME: ICD-10-CM

## 2021-11-24 DIAGNOSIS — N80.9 ENDOMETRIOSIS: ICD-10-CM

## 2021-11-24 DIAGNOSIS — M79.7 FIBROMYALGIA: ICD-10-CM

## 2021-11-24 DIAGNOSIS — G89.29 CHRONIC PELVIC PAIN IN FEMALE: ICD-10-CM

## 2021-11-24 DIAGNOSIS — N94.89 PELVIC PAIN SYNDROME: ICD-10-CM

## 2021-11-24 DIAGNOSIS — R10.2 CHRONIC PELVIC PAIN IN FEMALE: ICD-10-CM

## 2021-12-06 ENCOUNTER — OFFICE VISIT (OUTPATIENT)
Dept: PAIN MANAGEMENT | Age: 36
End: 2021-12-06
Payer: COMMERCIAL

## 2021-12-06 VITALS
DIASTOLIC BLOOD PRESSURE: 70 MMHG | BODY MASS INDEX: 23.39 KG/M2 | TEMPERATURE: 97.5 F | WEIGHT: 132 LBS | SYSTOLIC BLOOD PRESSURE: 102 MMHG | HEART RATE: 73 BPM | OXYGEN SATURATION: 96 % | HEIGHT: 63 IN

## 2021-12-06 DIAGNOSIS — N94.89 PELVIC PAIN SYNDROME: ICD-10-CM

## 2021-12-06 DIAGNOSIS — G89.29 CHRONIC PELVIC PAIN IN FEMALE: ICD-10-CM

## 2021-12-06 DIAGNOSIS — R10.2 CHRONIC PELVIC PAIN IN FEMALE: ICD-10-CM

## 2021-12-06 DIAGNOSIS — G89.4 CHRONIC PAIN SYNDROME: ICD-10-CM

## 2021-12-06 DIAGNOSIS — M79.7 FIBROMYALGIA: ICD-10-CM

## 2021-12-06 DIAGNOSIS — N80.9 ENDOMETRIOSIS: ICD-10-CM

## 2021-12-06 PROCEDURE — 99213 OFFICE O/P EST LOW 20 MIN: CPT | Performed by: INTERNAL MEDICINE

## 2021-12-06 RX ORDER — GABAPENTIN 400 MG/1
CAPSULE ORAL
Qty: 120 CAPSULE | Refills: 1 | Status: SHIPPED | OUTPATIENT
Start: 2021-12-06 | End: 2022-01-17 | Stop reason: SDUPTHER

## 2021-12-06 RX ORDER — NORTRIPTYLINE HYDROCHLORIDE 10 MG/1
10-20 CAPSULE ORAL NIGHTLY
Qty: 60 CAPSULE | Refills: 1 | Status: SHIPPED | OUTPATIENT
Start: 2021-12-06 | End: 2022-01-17

## 2021-12-06 RX ORDER — GABAPENTIN 400 MG/1
CAPSULE ORAL
Qty: 120 CAPSULE | Refills: 1 | OUTPATIENT
Start: 2021-12-06

## 2021-12-06 RX ORDER — METHOCARBAMOL 500 MG/1
500 TABLET, FILM COATED ORAL 2 TIMES DAILY
Qty: 60 TABLET | Refills: 1 | Status: SHIPPED | OUTPATIENT
Start: 2021-12-06 | End: 2022-01-17 | Stop reason: SDUPTHER

## 2021-12-06 RX ORDER — OXYCODONE HYDROCHLORIDE AND ACETAMINOPHEN 5; 325 MG/1; MG/1
0.5 TABLET ORAL EVERY 6 HOURS PRN
Qty: 42 TABLET | Refills: 0 | Status: SHIPPED | OUTPATIENT
Start: 2021-12-06 | End: 2022-01-17 | Stop reason: SDUPTHER

## 2021-12-06 RX ORDER — ERYTHROMYCIN AND BENZOYL PEROXIDE 30; 50 MG/G; MG/G
GEL TOPICAL
Qty: 60 G | Refills: 0 | Status: SHIPPED | OUTPATIENT
Start: 2021-12-06 | End: 2022-02-24 | Stop reason: SDUPTHER

## 2021-12-06 RX ORDER — NORTRIPTYLINE HYDROCHLORIDE 10 MG/1
CAPSULE ORAL
Qty: 60 CAPSULE | Refills: 1 | OUTPATIENT
Start: 2021-12-06

## 2021-12-06 NOTE — PROGRESS NOTES
Arianna Boles Jilg  1985  <[id number]>    HISTORY OF PRESENT ILLNESS:  Ms. Daljit Singer is a 39 y.o. female returns for a follow up visit for multiple medical problems. Her current presenting problems are   1. Chronic pain syndrome    2. Endometriosis    3. Fibromyalgia    4. Pelvic pain syndrome    5. Chronic pelvic pain in female    . As per information/history obtained from the PADT(patient assessment and documentation tool) - She complains of pain in the arms Bilateral, lower back, lower leg Bilateral and bilateral arms, and shoulders  She rates the pain 7/10 and describes it as aching, stabbing. Pain is made worse by: sitting, lifting. Current treatment regimen has helped relieve about 30% of the pain. She denies side effects from the current pain regimen. Patient reports that since the last follow up visit the physical functioning is worse, family/social relationships are unchanged, mood is unchanged and sleep patterns are unchanged, and that the overall functioning is unchanged. Patient denies neurological bowel or bladder. Patient denies misusing/abusing her narcotic pain medications or using any illegal drugs. There are No indicators for possible drug abuse, addiction or diversion problems. Upon obtaining the medical history from Ms. Daljit Singer regarding today's office visit for her presenting problems, patient states she has been having doing about the same, she states the medications are helping some. Ms. Lexie Sorensen mentions she is using Percocet 1 per day along with the other. She mentions she is using Neurontin but Pamelor very seldom. Patient says she is having some acne symptoms, she wants her Benzamycin Gel filled again. ALLERGIES: Patients list of allergies were reviewed     MEDICATIONS: Ms. Daljit Singer list of medications were reviewed. Her current medications are   Outpatient Medications Prior to Visit   Medication Sig Dispense Refill    oxyCODONE-acetaminophen (PERCOCET) 5-325 MG per tablet Take 0.5 tablets by mouth every 6 hours as needed for Pain (max 1-2 per day) for up to 42 days. 42 tablet 0    methocarbamol (ROBAXIN) 500 MG tablet Take 1 tablet by mouth 2 times daily 60 tablet 1    nortriptyline (PAMELOR) 10 MG capsule Take 1-2 capsules by mouth nightly 60 capsule 1    ondansetron (ZOFRAN-ODT) 4 MG disintegrating tablet Take 1 tablet by mouth every 8 hours as needed for Nausea 90 tablet 2    Fluticasone-Salmeterol (AIRDUO RESPICLICK 909/45) 852-58 MCG/ACT AEPB Inhale 1 puff into the lungs 2 times daily 1 each 0    Probiotic Product (PROBIOTIC DAILY PO) Take by mouth daily      calcium carbonate (TUMS) 500 MG chewable tablet Take 1 tablet by mouth daily.  Multiple Vitamin (MULTI-VITAMIN DAILY PO) Take  by mouth.  gabapentin (NEURONTIN) 400 MG capsule Take 1 capsule po in AM, take 1 capsule po Afternoon, take 2 capsules po in  capsule 1     No facility-administered medications prior to visit. REVIEW OF SYSTEMS:    Respiratory: Negative for apnea, chest tightness and shortness of breath or change in baseline breathing. PHYSICAL EXAM:   Nursing note and vitals reviewed. /70   Pulse 73   Temp 97.5 °F (36.4 °C)   Ht 5' 3.25\" (1.607 m)   Wt 132 lb (59.9 kg)   SpO2 96%   BMI 23.20 kg/m²   Constitutional: She appears well-developed and well-nourished. No acute distress. Cardiovascular: Normal rate, regular rhythm, normal heart sounds, and does not have murmur. Pulmonary/Chest: Effort normal. No respiratory distress. She does not have wheezes in the lung fields. She has no rales. Neurological/Psychiatric:She is alert and oriented to person, place, and time. Coordination is  normal.  Her mood isAppropriate and affect is Neutral/Euthymic(normal) . IMPRESSION:   1. Chronic pain syndrome    2. Endometriosis    3. Fibromyalgia    4. Pelvic pain syndrome    5.  Chronic pelvic pain in female        PLAN:  Informed verbal consent was of Chronic pain syndrome, Endometriosis, Fibromyalgia, Pelvic pain syndrome, and Chronic pelvic pain in female were pertinent to this visit. Risks and benefits of the medications and other alternative treatments  including no treatment were discussed with the patient. The common side effects of these medications were also explained to the patient. Informed verbal consent was obtained. Goals of current treatment regimen include improvement in pain, restoration of functioning- with focus on improvement in physical performance, general activity, work or disability,emotional distress, health care utilization and  decreased medication consumption. Will continue to monitor progress towards achieving/maintaining therapeutic goals with special emphasis on  1. Improvement in perceived interfernce  of pain with ADL's. Ability to do home exercises independently. Ability to do household chores indoor and/or outdoor work and social and leisure activities. Improve psychosocial and physical functioning. - she is showing progression towards this treatment goal with the current regimen. 2. Ability to focus/concentrate at work and perform the duties required of her at work  Sit through a workday without lower extremity symptoms. Stand 30-60 minutes without lower extremity symptoms. Ability to lift up to 10-20 lbs. Ability to go up and down stairs. Sit 30-60 minutes  Without having to stand up frequently. - she is maintaining/progressing towards her work related goals with the current regimen. She was advised against drinking alcohol with the narcotic pain medicines, advised against driving or handling machinery while adjusting the dose of medicines or if having cognitive  issues related to the current medications. Risk of overdose and death, if medicines not taken as prescribed, were also discussed. If the patient develops new symptoms or if the symptoms worsen, the patient should call the office.     While transcribing

## 2022-01-17 ENCOUNTER — OFFICE VISIT (OUTPATIENT)
Dept: PAIN MANAGEMENT | Age: 37
End: 2022-01-17
Payer: COMMERCIAL

## 2022-01-17 VITALS
HEART RATE: 76 BPM | BODY MASS INDEX: 23.04 KG/M2 | SYSTOLIC BLOOD PRESSURE: 112 MMHG | DIASTOLIC BLOOD PRESSURE: 62 MMHG | HEIGHT: 63 IN | WEIGHT: 130 LBS

## 2022-01-17 DIAGNOSIS — N80.9 ENDOMETRIOSIS: ICD-10-CM

## 2022-01-17 DIAGNOSIS — G89.4 CHRONIC PAIN SYNDROME: ICD-10-CM

## 2022-01-17 DIAGNOSIS — M79.7 FIBROMYALGIA: ICD-10-CM

## 2022-01-17 DIAGNOSIS — R10.2 CHRONIC PELVIC PAIN IN FEMALE: ICD-10-CM

## 2022-01-17 DIAGNOSIS — G89.29 CHRONIC PELVIC PAIN IN FEMALE: ICD-10-CM

## 2022-01-17 DIAGNOSIS — N94.89 PELVIC PAIN SYNDROME: ICD-10-CM

## 2022-01-17 PROCEDURE — 99213 OFFICE O/P EST LOW 20 MIN: CPT | Performed by: INTERNAL MEDICINE

## 2022-01-17 RX ORDER — GABAPENTIN 400 MG/1
CAPSULE ORAL
Qty: 120 CAPSULE | Refills: 1 | Status: SHIPPED | OUTPATIENT
Start: 2022-01-17 | End: 2022-04-07 | Stop reason: SDUPTHER

## 2022-01-17 RX ORDER — OXYCODONE HYDROCHLORIDE AND ACETAMINOPHEN 5; 325 MG/1; MG/1
0.5 TABLET ORAL EVERY 6 HOURS PRN
Qty: 42 TABLET | Refills: 0 | Status: SHIPPED | OUTPATIENT
Start: 2022-01-17 | End: 2022-02-24 | Stop reason: SDUPTHER

## 2022-01-17 RX ORDER — METHOCARBAMOL 500 MG/1
500 TABLET, FILM COATED ORAL 2 TIMES DAILY
Qty: 60 TABLET | Refills: 1 | Status: SHIPPED | OUTPATIENT
Start: 2022-01-17 | End: 2022-04-07 | Stop reason: SDUPTHER

## 2022-01-17 NOTE — PROGRESS NOTES
Ruslan Anderson Jilg  1985  <[id number]>    HISTORY OF PRESENT ILLNESS:  Ms. Derek Rosario is a 39 y.o. female returns for a follow up visit for multiple medical problems. Her current presenting problems are   1. Chronic pain syndrome    2. Pelvic pain syndrome    3. Endometriosis    4. Chronic pelvic pain in female    5. Fibromyalgia    . As per information/history obtained from the PADT(patient assessment and documentation tool) - She complains of pain in the neck, shoulders Bilateral, elbows Bilateral, hands Bilateral, lower back, lower leg Bilateral and feet Bilateral with radiation to the  She rates the pain 8/10 and describes it as aching, stabbing. Pain is made worse by: sitting, lifting. Current treatment regimen has helped relieve about 30% of the pain. She denies side effects from the current pain regimen. Patient reports that since the last follow up visit the physical functioning is worse, family/social relationships are unchanged, mood is unchanged and sleep patterns are unchanged, and that the overall functioning is unchanged. Patient denies neurological bowel or bladder. Patient denies misusing/abusing her narcotic pain medications or using any illegal drugs. There are No indicators for possible drug abuse, addiction or diversion problems. Upon obtaining the medical history from Ms. Derek Rosario regarding today's office visit for her presenting problems, patient states she has been doing fair, her pain has been up and down. Ms. Derek Rosario says the NSAID's are helping with the pain. Patient reports she is working par time still. She states she is using Neurontin 1600 mg along with Percocet 1 per day or so. Patient is complaining of her abdomen and back hurting the most.        ALLERGIES: Patients list of allergies were reviewed     MEDICATIONS: Ms. Derek Rosario list of medications were reviewed. Her current medications are   Outpatient Medications Prior to Visit   Medication Sig Dispense Refill    oxyCODONE-acetaminophen (PERCOCET) 5-325 MG per tablet Take 0.5 tablets by mouth every 6 hours as needed for Pain (max 1-2 per day) for up to 42 days. 42 tablet 0    methocarbamol (ROBAXIN) 500 MG tablet Take 1 tablet by mouth 2 times daily 60 tablet 1    nortriptyline (PAMELOR) 10 MG capsule Take 1-2 capsules by mouth nightly 60 capsule 1    ondansetron (ZOFRAN-ODT) 4 MG disintegrating tablet Take 1 tablet by mouth every 8 hours as needed for Nausea 90 tablet 2    Fluticasone-Salmeterol (AIRDUO RESPICLICK 324/98) 748-97 MCG/ACT AEPB Inhale 1 puff into the lungs 2 times daily 1 each 0    Probiotic Product (PROBIOTIC DAILY PO) Take by mouth daily      calcium carbonate (TUMS) 500 MG chewable tablet Take 1 tablet by mouth daily.  Multiple Vitamin (MULTI-VITAMIN DAILY PO) Take  by mouth.  gabapentin (NEURONTIN) 400 MG capsule Take 1 capsule po in AM, take 1 capsule po Afternoon, take 2 capsules po in  capsule 1     No facility-administered medications prior to visit. REVIEW OF SYSTEMS:    Respiratory: Negative for apnea, chest tightness and shortness of breath or change in baseline breathing. PHYSICAL EXAM:   Nursing note and vitals reviewed. /62   Pulse 76   Ht 5' 3.25\" (1.607 m)   Wt 130 lb (59 kg)   BMI 22.85 kg/m²   Constitutional: She appears well-developed and well-nourished. No acute distress. Cardiovascular: Normal rate, regular rhythm, normal heart sounds, and does not have murmur. Pulmonary/Chest: Effort normal. No respiratory distress. She does not have wheezes in the lung fields. She has no rales. Neurological/Psychiatric:She is alert and oriented to person, place, and time. Coordination is  normal.  Her mood isAppropriate and affect is Neutral/Euthymic(normal) . IMPRESSION:   1. Chronic pain syndrome    2. Pelvic pain syndrome    3. Endometriosis    4. Chronic pelvic pain in female    5.  Fibromyalgia        PLAN:  Informed verbal consent was obtained  -ROM/Stretching exercises as advised   -She was advised to increase fluids ( 5-7  glasses of fluid daily), limit caffeine, avoid cheese products, increase dietary fiber, increase activity and exercise as tolerated and relax regularly and enjoy meals   -Walking 20-30 minutes daily as tolerated   -Continue with Percocet 1/2 tablet BID PRN   -Discontinue Pamelor, she feels she does not need it     Current Outpatient Medications   Medication Sig Dispense Refill    oxyCODONE-acetaminophen (PERCOCET) 5-325 MG per tablet Take 0.5 tablets by mouth every 6 hours as needed for Pain (max 1-2 per day) for up to 42 days. 42 tablet 0    methocarbamol (ROBAXIN) 500 MG tablet Take 1 tablet by mouth 2 times daily 60 tablet 1    nortriptyline (PAMELOR) 10 MG capsule Take 1-2 capsules by mouth nightly 60 capsule 1    ondansetron (ZOFRAN-ODT) 4 MG disintegrating tablet Take 1 tablet by mouth every 8 hours as needed for Nausea 90 tablet 2    Fluticasone-Salmeterol (AIRDUO RESPICLICK 130/46) 735-76 MCG/ACT AEPB Inhale 1 puff into the lungs 2 times daily 1 each 0    Probiotic Product (PROBIOTIC DAILY PO) Take by mouth daily      calcium carbonate (TUMS) 500 MG chewable tablet Take 1 tablet by mouth daily.  Multiple Vitamin (MULTI-VITAMIN DAILY PO) Take  by mouth.  gabapentin (NEURONTIN) 400 MG capsule Take 1 capsule po in AM, take 1 capsule po Afternoon, take 2 capsules po in  capsule 1     No current facility-administered medications for this visit. I will continue her current medication regimen  which is part of the above treatment schedule. It has been helping with Ms. Bowers Neigh chronic  medical problems which for this visit include:   Diagnoses of Chronic pain syndrome, Pelvic pain syndrome, Endometriosis, Chronic pelvic pain in female, and Fibromyalgia were pertinent to this visit.    Risks and benefits of the medications and other alternative treatments  including no treatment were discussed with the patient. The common side effects of these medications were also explained to the patient. Informed verbal consent was obtained. Goals of current treatment regimen include improvement in pain, restoration of functioning- with focus on improvement in physical performance, general activity, work or disability,emotional distress, health care utilization and  decreased medication consumption. Will continue to monitor progress towards achieving/maintaining therapeutic goals with special emphasis on  1. Improvement in perceived interfernce  of pain with ADL's. Ability to do home exercises independently. Ability to do household chores indoor and/or outdoor work and social and leisure activities. Improve psychosocial and physical functioning. - she is showing progression towards this treatment goal with the current regimen. She was advised against drinking alcohol with the narcotic pain medicines, advised against driving or handling machinery while adjusting the dose of medicines or if having cognitive  issues related to the current medications. Risk of overdose and death, if medicines not taken as prescribed, were also discussed. If the patient develops new symptoms or if the symptoms worsen, the patient should call the office. While transcribing every attempt was made to maintain the accuracy of the note in terms of it's contents,there may have been some errors made inadvertently. Thank you for allowing me to participate in the care of this patient.     Bernie Yung MD.    Cc: Masoud Nassar MD

## 2022-02-24 ENCOUNTER — OFFICE VISIT (OUTPATIENT)
Dept: PAIN MANAGEMENT | Age: 37
End: 2022-02-24
Payer: COMMERCIAL

## 2022-02-24 VITALS
SYSTOLIC BLOOD PRESSURE: 109 MMHG | WEIGHT: 135 LBS | HEART RATE: 61 BPM | DIASTOLIC BLOOD PRESSURE: 74 MMHG | BODY MASS INDEX: 23.73 KG/M2 | OXYGEN SATURATION: 100 %

## 2022-02-24 DIAGNOSIS — G89.4 CHRONIC PAIN SYNDROME: ICD-10-CM

## 2022-02-24 DIAGNOSIS — N80.9 ENDOMETRIOSIS: ICD-10-CM

## 2022-02-24 DIAGNOSIS — N94.89 PELVIC PAIN SYNDROME: ICD-10-CM

## 2022-02-24 DIAGNOSIS — M79.7 FIBROMYALGIA: ICD-10-CM

## 2022-02-24 DIAGNOSIS — G89.29 CHRONIC PELVIC PAIN IN FEMALE: ICD-10-CM

## 2022-02-24 DIAGNOSIS — R10.2 CHRONIC PELVIC PAIN IN FEMALE: ICD-10-CM

## 2022-02-24 PROCEDURE — 99213 OFFICE O/P EST LOW 20 MIN: CPT | Performed by: INTERNAL MEDICINE

## 2022-02-24 RX ORDER — OXYCODONE HYDROCHLORIDE AND ACETAMINOPHEN 5; 325 MG/1; MG/1
0.5 TABLET ORAL EVERY 6 HOURS PRN
Qty: 42 TABLET | Refills: 0 | Status: SHIPPED | OUTPATIENT
Start: 2022-02-24 | End: 2022-04-07 | Stop reason: SDUPTHER

## 2022-02-24 RX ORDER — ERYTHROMYCIN AND BENZOYL PEROXIDE 30; 50 MG/G; MG/G
GEL TOPICAL
Qty: 60 G | Refills: 0 | Status: SHIPPED | OUTPATIENT
Start: 2022-02-24 | End: 2022-02-28 | Stop reason: SDUPTHER

## 2022-02-24 NOTE — TELEPHONE ENCOUNTER
Requesting Refill  benzoyl peroxide-erythromycin (BENZAMYCIN) 5-3 % gel     Last Office Visit  -  10/4/21  Next Office Visit  -  none

## 2022-02-25 ENCOUNTER — TELEPHONE (OUTPATIENT)
Dept: FAMILY MEDICINE CLINIC | Age: 37
End: 2022-02-25

## 2022-02-25 DIAGNOSIS — L70.0 ACNE VULGARIS: Primary | ICD-10-CM

## 2022-02-28 RX ORDER — ERYTHROMYCIN AND BENZOYL PEROXIDE 30; 50 MG/G; MG/G
GEL TOPICAL
Qty: 60 G | Refills: 0 | Status: SHIPPED | OUTPATIENT
Start: 2022-02-28 | End: 2022-03-30

## 2022-02-28 NOTE — TELEPHONE ENCOUNTER
That's who I submitted the PA through. If you see in the top right hand corner of the PA response that's scanned into media it says ENVOLVE on it. The patient should receive a copy of that denial letter as well.

## 2022-03-03 NOTE — TELEPHONE ENCOUNTER
Pt called back stated insurance is now wanting progess notes to prove pt has acne or they will except a call with information [phone] pharmacy

## 2022-03-07 NOTE — TELEPHONE ENCOUNTER
I called [facility] and spoke to [employer]. She asked that I resubmit the PA along with chart notes. Also asked why patient isn't getting this from dermatologist? I have a feeling it is not going to get approved still but I am trying. The plan will tell the patient all it needs is a PA but the PA has requirements and that is what the patient's don't understand and the rep and myself were discussing this. Submitted PA for Benzoyl Peroxide-Erythromycin 5-3% gel, Key: [id number]. Medication has been DENIED. See attached denial letter. Please notify patient. Thank you.

## 2022-03-07 NOTE — TELEPHONE ENCOUNTER
-Pt called back today 3/7/22 with good Fax Number to Fax Progress Notes to patients insurance company. FAX To: [phone]    -Pt asking for any updates.

## 2022-03-23 DIAGNOSIS — N80.9 ENDOMETRIOSIS: ICD-10-CM

## 2022-03-23 DIAGNOSIS — G89.29 CHRONIC PELVIC PAIN IN FEMALE: ICD-10-CM

## 2022-03-23 DIAGNOSIS — N94.89 PELVIC PAIN SYNDROME: ICD-10-CM

## 2022-03-23 DIAGNOSIS — R10.2 CHRONIC PELVIC PAIN IN FEMALE: ICD-10-CM

## 2022-03-23 DIAGNOSIS — M79.7 FIBROMYALGIA: ICD-10-CM

## 2022-03-23 DIAGNOSIS — G89.4 CHRONIC PAIN SYNDROME: ICD-10-CM

## 2022-04-07 ENCOUNTER — TELEPHONE (OUTPATIENT)
Dept: PAIN MANAGEMENT | Age: 37
End: 2022-04-07

## 2022-04-07 ENCOUNTER — OFFICE VISIT (OUTPATIENT)
Dept: PAIN MANAGEMENT | Age: 37
End: 2022-04-07
Payer: COMMERCIAL

## 2022-04-07 VITALS
SYSTOLIC BLOOD PRESSURE: 112 MMHG | DIASTOLIC BLOOD PRESSURE: 74 MMHG | OXYGEN SATURATION: 100 % | WEIGHT: 133.8 LBS | HEART RATE: 68 BPM | BODY MASS INDEX: 23.51 KG/M2

## 2022-04-07 DIAGNOSIS — N94.89 PELVIC PAIN SYNDROME: ICD-10-CM

## 2022-04-07 DIAGNOSIS — G89.4 CHRONIC PAIN SYNDROME: ICD-10-CM

## 2022-04-07 DIAGNOSIS — R10.2 CHRONIC PELVIC PAIN IN FEMALE: ICD-10-CM

## 2022-04-07 DIAGNOSIS — G89.29 CHRONIC PELVIC PAIN IN FEMALE: ICD-10-CM

## 2022-04-07 DIAGNOSIS — M79.7 FIBROMYALGIA: ICD-10-CM

## 2022-04-07 DIAGNOSIS — N80.9 ENDOMETRIOSIS: ICD-10-CM

## 2022-04-07 PROCEDURE — 99213 OFFICE O/P EST LOW 20 MIN: CPT | Performed by: INTERNAL MEDICINE

## 2022-04-07 RX ORDER — GABAPENTIN 400 MG/1
CAPSULE ORAL
Qty: 120 CAPSULE | Refills: 1 | OUTPATIENT
Start: 2022-04-07

## 2022-04-07 RX ORDER — OXYCODONE HYDROCHLORIDE AND ACETAMINOPHEN 5; 325 MG/1; MG/1
0.5 TABLET ORAL EVERY 6 HOURS PRN
Qty: 42 TABLET | Refills: 0 | Status: SHIPPED | OUTPATIENT
Start: 2022-04-07 | End: 2022-05-19 | Stop reason: SDUPTHER

## 2022-04-07 RX ORDER — GABAPENTIN 400 MG/1
CAPSULE ORAL
Qty: 120 CAPSULE | Refills: 1 | Status: SHIPPED | OUTPATIENT
Start: 2022-04-07 | End: 2022-05-19 | Stop reason: SDUPTHER

## 2022-04-07 RX ORDER — METHOCARBAMOL 500 MG/1
500 TABLET, FILM COATED ORAL 2 TIMES DAILY
Qty: 60 TABLET | Refills: 1 | Status: SHIPPED | OUTPATIENT
Start: 2022-04-07 | End: 2022-05-19 | Stop reason: SDUPTHER

## 2022-04-07 NOTE — TELEPHONE ENCOUNTER
I received call from patient that a PA is needed for her PERCOCET. Can you please update Epic with insurance info? Right now it is showing no coverage. If this requires a response please respond to the pool ( [facility] [address] [facility]). Thank you please advise patient.

## 2022-04-07 NOTE — TELEPHONE ENCOUNTER
The patient called back, and she said that she will call the insurance. She said when she figures it out she will call me back.

## 2022-05-16 NOTE — TELEPHONE ENCOUNTER
Submitted PA for OXYCODONE  [facility] Key: [id number] STATUS: \"The drug has been approved from 05/16/2022 to 08/16/2022. The patient was notified by . The patient does have Arthor Spruce; it was reinstated. Please add that insurance back in. If this requires a response please respond to the pool ( [facility] [address] [facility]). Thank you please advise patient.

## 2022-05-19 ENCOUNTER — OFFICE VISIT (OUTPATIENT)
Dept: PAIN MANAGEMENT | Age: 37
End: 2022-05-19
Payer: COMMERCIAL

## 2022-05-19 VITALS
BODY MASS INDEX: 23.13 KG/M2 | SYSTOLIC BLOOD PRESSURE: 116 MMHG | DIASTOLIC BLOOD PRESSURE: 80 MMHG | HEART RATE: 69 BPM | WEIGHT: 131.6 LBS | OXYGEN SATURATION: 99 %

## 2022-05-19 DIAGNOSIS — G89.4 CHRONIC PAIN SYNDROME: ICD-10-CM

## 2022-05-19 DIAGNOSIS — G89.29 CHRONIC PELVIC PAIN IN FEMALE: ICD-10-CM

## 2022-05-19 DIAGNOSIS — N94.89 PELVIC PAIN SYNDROME: ICD-10-CM

## 2022-05-19 DIAGNOSIS — Z51.81 ENCOUNTER FOR THERAPEUTIC DRUG MONITORING: Primary | ICD-10-CM

## 2022-05-19 DIAGNOSIS — M79.7 FIBROMYALGIA: ICD-10-CM

## 2022-05-19 DIAGNOSIS — R10.2 CHRONIC PELVIC PAIN IN FEMALE: ICD-10-CM

## 2022-05-19 DIAGNOSIS — N80.9 ENDOMETRIOSIS: ICD-10-CM

## 2022-05-19 PROCEDURE — 99213 OFFICE O/P EST LOW 20 MIN: CPT | Performed by: INTERNAL MEDICINE

## 2022-05-19 RX ORDER — METHOCARBAMOL 500 MG/1
500 TABLET, FILM COATED ORAL 2 TIMES DAILY
Qty: 60 TABLET | Refills: 1 | Status: SHIPPED | OUTPATIENT
Start: 2022-05-19 | End: 2022-06-30 | Stop reason: SDUPTHER

## 2022-05-19 RX ORDER — GABAPENTIN 400 MG/1
CAPSULE ORAL
Qty: 120 CAPSULE | Refills: 1 | Status: SHIPPED | OUTPATIENT
Start: 2022-05-19 | End: 2022-05-19 | Stop reason: CLARIF

## 2022-05-19 RX ORDER — GABAPENTIN 400 MG/1
CAPSULE ORAL
Qty: 150 CAPSULE | Refills: 1 | Status: SHIPPED | OUTPATIENT
Start: 2022-05-19 | End: 2022-06-30 | Stop reason: SDUPTHER

## 2022-05-19 RX ORDER — OXYCODONE HYDROCHLORIDE AND ACETAMINOPHEN 5; 325 MG/1; MG/1
0.5 TABLET ORAL EVERY 6 HOURS PRN
Qty: 42 TABLET | Refills: 0 | Status: SHIPPED | OUTPATIENT
Start: 2022-05-19 | End: 2022-06-30 | Stop reason: SDUPTHER

## 2022-05-19 NOTE — PROGRESS NOTES
Earna Northern Lazarus Baton  1985  [id number]      HISTORY OF PRESENT ILLNESS:  Ms. Kimberley Briones is a 39 y.o. female returns for a follow up visit for pain management  She has a diagnosis of   1. Chronic pain syndrome    2. Endometriosis    3. Chronic pelvic pain in female    4. Pelvic pain syndrome    5. Fibromyalgia    . She complains of pain in the bilateral shoulders, bilateral elbows, upper back, lower back, bilateral knees with radiation to the bilateral arms, abdomen. bilateral legs  She rates the pain 7/10 and describes it as sharp, aching, numbness. Current treatment regimen has helped relieve about 30% of the pain. She denies any side effects from the current pain regimen. Patient reports that since the last follow up visit the physical functioning is worse, family/social relationships are unchanged, mood is unchanged sleep patterns are worse, and that the overall functioning is worse. Patient denies misusing/abusing her narcotic pain medications or using any illegal drugs. There are No indicators for possible drug abuse, addiction or diversion problems, patient states she has been doing fair, her pain has been manageable with the medications. Ms. Kimberley Briones states she is using Percocet 1 per day mostly along with Neurontin 1600 mg. Patient denies any constipation symptoms. She reports she is working full time and managing her ADL's. ALLERGIES: Patients list of allergies were reviewed     MEDICATIONS: Ms. Kimberley Briones list of medications were reviewed. Her current medications are   Outpatient Medications Prior to Visit   Medication Sig Dispense Refill    oxyCODONE-acetaminophen (PERCOCET) 5-325 MG per tablet Take 0.5 tablets by mouth every 6 hours as needed for Pain (max 1-2 per day) for up to 42 days.  42 tablet 0    methocarbamol (ROBAXIN) 500 MG tablet Take 1 tablet by mouth 2 times daily 60 tablet 1    ondansetron (ZOFRAN-ODT) 4 MG disintegrating tablet Take 1 tablet by mouth every 8 hours as needed for Nausea 90 tablet 2    Fluticasone-Salmeterol (AIRDUO RESPICLICK 026/44) 742-20 MCG/ACT AEPB Inhale 1 puff into the lungs 2 times daily 1 each 0    Probiotic Product (PROBIOTIC DAILY PO) Take by mouth daily      calcium carbonate (TUMS) 500 MG chewable tablet Take 1 tablet by mouth daily.  Multiple Vitamin (MULTI-VITAMIN DAILY PO) Take  by mouth.  gabapentin (NEURONTIN) 400 MG capsule Take 1 capsule po in AM, take 1 capsule po Afternoon, take 2 capsules po in  capsule 1     No facility-administered medications prior to visit. REVIEW OF SYSTEMS:    Respiratory: Negative for apnea, chest tightness and shortness of breath or change in baseline breathing. PHYSICAL EXAM:   Nursing note and vitals reviewed. /80   Pulse 69   Wt 131 lb 9.6 oz (59.7 kg)   SpO2 99%   BMI 23.13 kg/m²   Constitutional: She appears well-developed and well-nourished. No acute distress. Cardiovascular: Normal rate, regular rhythm, normal heart sounds, and does not have murmur. Pulmonary/Chest: Effort normal. No respiratory distress. She does not have wheezes in the lung fields. She has no rales. Neurological/Psychiatric:She is alert and oriented to person, place, and time. Coordination is  normal.  Her mood isAppropriate and affect is Neutral/Euthymic(normal) . Her    IMPRESSION:   1. Chronic pain syndrome    2. Endometriosis    3. Chronic pelvic pain in female    4. Pelvic pain syndrome    5.  Fibromyalgia        PLAN:  Informed verbal consent was obtained  -ROM/Stretching exercises as advised   -Increase Neurontin to 2000 mg in divided doses   -she was advised proper sleep hygiene-told to avoid:use of caffeine or other stimulants after noon, alcohol use near bedtime, long or frequent naps during the day, erratic sleep schedule, heavy meals near bedtime, vigorous exercise near bedtime and use of electronic devices near bedtime   -Continue with Percocet 1/2 tablet BID PRN   -She was advised to increase fluids ( 5-7  glasses of fluid daily), limit caffeine, avoid cheese products, increase dietary fiber, increase activity and exercise as tolerated and relax regularly and enjoy meals   -Urine drug screen with GC/MS for opiates and drugs of abuse was ordered and will follow up on results. Current Outpatient Medications   Medication Sig Dispense Refill    oxyCODONE-acetaminophen (PERCOCET) 5-325 MG per tablet Take 0.5 tablets by mouth every 6 hours as needed for Pain (max 1-2 per day) for up to 42 days. 42 tablet 0    methocarbamol (ROBAXIN) 500 MG tablet Take 1 tablet by mouth 2 times daily 60 tablet 1    ondansetron (ZOFRAN-ODT) 4 MG disintegrating tablet Take 1 tablet by mouth every 8 hours as needed for Nausea 90 tablet 2    Fluticasone-Salmeterol (AIRDUO RESPICLICK 081/74) 026-64 MCG/ACT AEPB Inhale 1 puff into the lungs 2 times daily 1 each 0    Probiotic Product (PROBIOTIC DAILY PO) Take by mouth daily      calcium carbonate (TUMS) 500 MG chewable tablet Take 1 tablet by mouth daily.  Multiple Vitamin (MULTI-VITAMIN DAILY PO) Take  by mouth.  gabapentin (NEURONTIN) 400 MG capsule Take 1 capsule po in AM, take 1 capsule po Afternoon, take 2 capsules po in  capsule 1     No current facility-administered medications for this visit. I will continue her current medication regimen  which is part of the above treatment schedule. It has been helping with Ms. Ba Marks chronic  medical problems which for this visit include:   Diagnoses of Chronic pain syndrome, Endometriosis, Chronic pelvic pain in female, Pelvic pain syndrome, and Fibromyalgia were pertinent to this visit. Risks and benefits of the medications and other alternative treatments  including no treatment were discussed with the patient. The common side effects of these medications were also explained to the patient. Informed verbal consent was obtained.    Goals of current treatment regimen include improvement in pain, restoration of functioning- with focus on improvement in physical performance, general activity, work or disability,emotional distress, health care utilization and  decreased medication consumption. Will continue to monitor progress towards achieving/maintaining therapeutic goals with special emphasis on  1. Improvement in perceived interfernce  of pain with ADL's. Ability to do home exercises independently. Ability to do household chores indoor and/or outdoor work and social and leisure activities. Improve psychosocial and physical functioning. - she is showing progression towards this treatment goal with the current regimen. She was advised against drinking alcohol with the narcotic pain medicines, advised against driving or handling machinery while adjusting the dose of medicines or if having cognitive  issues related to the current medications. Risk of overdose and death, if medicines not taken as prescribed, were also discussed. If the patient develops new symptoms or if the symptoms worsen, the patient should call the office. While transcribing every attempt was made to maintain the accuracy of the note in terms of it's contents,there may have been some errors made inadvertently. Thank you for allowing me to participate in the care of this patient.     Tomasz Henry MD.    Cc: Herman Holly MD

## 2022-06-23 ENCOUNTER — TELEPHONE (OUTPATIENT)
Dept: FAMILY MEDICINE CLINIC | Age: 37
End: 2022-06-23

## 2022-06-23 NOTE — TELEPHONE ENCOUNTER
----- Message from Nora Carreno sent at 6/23/2022  3:16 PM EDT -----  Subject: Referral Request    QUESTIONS   Reason for referral request? Geo Ngo called for a new referral to a   dermatologist. The currant Dr she was seeing through [address] left the   practice. Will Dr. Flora Hilario refer her to another Dr with in [address] for her   skin care needs. Please reach out to Bailey at your nearest convenience. Has the physician seen you for this condition before? No   Preferred Specialist (if applicable)? Do you already have an appointment scheduled? Additional Information for Provider?   ---------------------------------------------------------------------------  --------------  CALL BACK INFO  What is the best way for the office to contact you? OK to leave message on   voicemail  Preferred Call Back Phone Number? [phone]  ---------------------------------------------------------------------------  --------------  SCRIPT ANSWERS  Relationship to Patient?  Self

## 2022-06-30 ENCOUNTER — OFFICE VISIT (OUTPATIENT)
Dept: PAIN MANAGEMENT | Age: 37
End: 2022-06-30
Payer: COMMERCIAL

## 2022-06-30 ENCOUNTER — TELEPHONE (OUTPATIENT)
Dept: PAIN MANAGEMENT | Age: 37
End: 2022-06-30

## 2022-06-30 VITALS
BODY MASS INDEX: 22.99 KG/M2 | HEART RATE: 69 BPM | SYSTOLIC BLOOD PRESSURE: 115 MMHG | WEIGHT: 130.8 LBS | OXYGEN SATURATION: 99 % | DIASTOLIC BLOOD PRESSURE: 67 MMHG

## 2022-06-30 DIAGNOSIS — N80.9 ENDOMETRIOSIS: ICD-10-CM

## 2022-06-30 DIAGNOSIS — M79.7 FIBROMYALGIA: ICD-10-CM

## 2022-06-30 DIAGNOSIS — N94.89 PELVIC PAIN SYNDROME: ICD-10-CM

## 2022-06-30 DIAGNOSIS — R10.2 CHRONIC PELVIC PAIN IN FEMALE: ICD-10-CM

## 2022-06-30 DIAGNOSIS — G89.29 CHRONIC PELVIC PAIN IN FEMALE: ICD-10-CM

## 2022-06-30 DIAGNOSIS — Z51.81 ENCOUNTER FOR THERAPEUTIC DRUG MONITORING: ICD-10-CM

## 2022-06-30 DIAGNOSIS — G89.4 CHRONIC PAIN SYNDROME: ICD-10-CM

## 2022-06-30 PROCEDURE — 99213 OFFICE O/P EST LOW 20 MIN: CPT | Performed by: INTERNAL MEDICINE

## 2022-06-30 RX ORDER — OXYCODONE HYDROCHLORIDE AND ACETAMINOPHEN 5; 325 MG/1; MG/1
0.5 TABLET ORAL EVERY 6 HOURS PRN
Qty: 60 TABLET | Refills: 0 | Status: SHIPPED | OUTPATIENT
Start: 2022-06-30 | End: 2022-08-22 | Stop reason: SDUPTHER

## 2022-06-30 RX ORDER — METHOCARBAMOL 500 MG/1
500 TABLET, FILM COATED ORAL 2 TIMES DAILY
Qty: 60 TABLET | Refills: 1 | Status: SHIPPED | OUTPATIENT
Start: 2022-06-30 | End: 2022-08-22 | Stop reason: SDUPTHER

## 2022-06-30 RX ORDER — CELECOXIB 200 MG/1
200 CAPSULE ORAL DAILY
Qty: 30 CAPSULE | Refills: 0 | Status: SHIPPED | OUTPATIENT
Start: 2022-06-30 | End: 2022-08-22 | Stop reason: SDUPTHER

## 2022-06-30 RX ORDER — GABAPENTIN 400 MG/1
CAPSULE ORAL
Qty: 150 CAPSULE | Refills: 1 | Status: SHIPPED | OUTPATIENT
Start: 2022-06-30 | End: 2022-08-22 | Stop reason: SDUPTHER

## 2022-06-30 NOTE — PROGRESS NOTES
Shannon Ruiz Andrea  1985  [id number]    HISTORY OF PRESENT ILLNESS:  Ms. Lazarus Mohamud is a 39 y.o. female returns for a follow up visit for multiple medical problems. Her  presenting problems are   1. Chronic pain syndrome    2. Pelvic pain syndrome    3. Endometriosis    4. Chronic pelvic pain in female    5. Fibromyalgia    6. Encounter for therapeutic drug monitoring    . As per information/history obtained from the PADT(patient assessment and documentation tool) -  She complains of pain in the abdomen, upper back, lower back and knees Bilateral with radiation to the shoulders Bilateral, arms Bilateral and hands Bilateral She rates the pain 8/10 and describes it as sharp, aching. Pain is made worse by: movement, walking, standing. Current treatment regimen has helped relieve about 30% of the pain. She denies side effects from the current pain regimen. Patient reports that since the last follow up visit the physical functioning is worse, family/social relationships are unchanged, mood is unchanged and sleep patterns are worse, and that the overall functioning is worse. Patient denies neurological bowel or bladder. Patient denies misusing/abusing her narcotic pain medications or using any illegal drugs. There are No indicators for possible drug abuse, addiction or diversion problems. Upon obtaining the medical history from Ms. Lazarus Mohamud regarding today's office visit for her presenting problems, patient complains she has been having increase pain in the Pelvic region. She mentions she has an appointment to see her GYN. She says her symptoms fare up around her periods. She reports she is using OTC NSAIDs as needed, but they are not helping. She states she is using Robaxin also along with Neurontin. She denies any constipation symptoms. Patient states her sleep is fair. Has fairly normal sleep latency. Averages about 4-6 hours of sleep a night. Denies any signs of sleep apnea.  Feels somewhat rested in the morning. ALLERGIES/PAST MED/FAM/SOC HISTORY: Ms. Law Failing allergies, past medical, family and social history were reviewed in the chart. Ms. Law Failing current medications are   Outpatient Medications Prior to Visit   Medication Sig Dispense Refill    ondansetron (ZOFRAN-ODT) 4 MG disintegrating tablet Take 1 tablet by mouth every 8 hours as needed for Nausea 90 tablet 2    Fluticasone-Salmeterol (AIRDUO RESPICLICK 202/63) 352-44 MCG/ACT AEPB Inhale 1 puff into the lungs 2 times daily 1 each 0    Probiotic Product (PROBIOTIC DAILY PO) Take by mouth daily      calcium carbonate (TUMS) 500 MG chewable tablet Take 1 tablet by mouth daily.  Multiple Vitamin (MULTI-VITAMIN DAILY PO) Take  by mouth.  oxyCODONE-acetaminophen (PERCOCET) 5-325 MG per tablet Take 0.5 tablets by mouth every 6 hours as needed for Pain (max 1-2 per day) for up to 42 days. 42 tablet 0    methocarbamol (ROBAXIN) 500 MG tablet Take 1 tablet by mouth 2 times daily 60 tablet 1    gabapentin (NEURONTIN) 400 MG capsule Take 1 capsule po in AM, take 2 capsule po Afternoon, take 2 capsules po in  capsule 1     No facility-administered medications prior to visit. REVIEW OF SYSTEMS: .   Respiratory: Negative for shortness of breath. Cardiovascular: Negative for chest pain, palpitations  Gastrointestinal: Negative for blood in stool, abdominal distention, nausea, vomiting, abdominal pain, diarrhea,constipation. Neurological: Negative for speech difficulty, weakness and light-headedness, dizziness, tremors, sleepiness  Psychiatric/Behavioral: Negative for suicidal ideas, hallucinations, behavioral problems, self-injury, decreased concentration/cognition, agitation, confusion. PHYSICAL EXAM:   Nursing note and vitals reviewed.  /67   Pulse 69   Wt 130 lb 12.8 oz (59.3 kg)   SpO2 99%   BMI 22.99 kg/m²   General Appearance: Patient is well nourished, well developed, well groomed and in no acute distress. Skin: Skin is warm and dry, good turgor . No rash or lesions noted. She is not diaphoretic. Pulmonary/Chest: Effort normal. No respiratory distress or use of accessory muscles. Auscultation revealing normal air entry. She does not have wheezes in the lung fields. She has no rales. Cardiovascular: Normal rate, regular rhythm, normal heart sounds, and does not have murmur. Exam reveals no gallop and no friction rub. Musculoskeletal / Extremities: Range of motion is poor. Gait is normal, assistive devices use: none. She exhibits edema: none, and no tenderness. Neurological/Psychiatric:She is alert and oriented to person, place, and time. Coordination is  normal.   Judgement and Insight is normal  Her mood is Appropriate and affect is Neutral/Euthymic(normal) . Her behavior is normal.   thought content normal.        IMPRESSION:     1. Chronic pain syndrome    2. Pelvic pain syndrome    3. Endometriosis    4. Chronic pelvic pain in female    5. Fibromyalgia    6. Encounter for therapeutic drug monitoring        PLAN:  Informed verbal consent was obtained. -ROM/Streching exercises as advised   -Continue with Percocet 1 per day as needed   -Start Celebrex 200 mg daily as needed   -She was advised to increase fluids ( 5-7  glasses of fluid daily), limit caffeine, avoid cheese products, increase dietary fiber, increase activity and exercise as tolerated and relax regularly and enjoy meals   -Continue with Neurontin 1600 mg   -Continue with Robaxin   -Follow up with GYN regard to repeat CT scan/USG   -Patient's urine drug screen results with GC/MS confirmation were obtained and reviewed and were negative for any illicit drugs. Prescribed medications were within acceptable range. -[place] record was obtained and reviewed  for the last one year and no indicators of drug misuse  were found.  Any other controlled substance prescriptions  seen on the record have been accounted for, I am aware of the patient receiving these medications. Jefe Palmer [place] record will be rechecked as part of office protocol. Ms. Phan Smith will be prescribed  the medications  listed below which are for treatment of her presenting  medical problems which for this visit include:   Diagnoses of Chronic pain syndrome, Pelvic pain syndrome, Endometriosis, Chronic pelvic pain in female, Fibromyalgia, and Encounter for therapeutic drug monitoring were pertinent to this visit. Medications/orders associated with this visit:    Current Outpatient Medications   Medication Sig Dispense Refill    oxyCODONE-acetaminophen (PERCOCET) 5-325 MG per tablet Take 0.5 tablets by mouth every 6 hours as needed for Pain (max 1-2 per day) for up to 56 days. 60 tablet 0    methocarbamol (ROBAXIN) 500 MG tablet Take 1 tablet by mouth 2 times daily 60 tablet 1    gabapentin (NEURONTIN) 400 MG capsule Take 1 capsule po in AM, take 2 capsule po Afternoon, take 2 capsules po in  capsule 1    celecoxib (CELEBREX) 200 MG capsule Take 1 capsule by mouth daily 30 capsule 0    ondansetron (ZOFRAN-ODT) 4 MG disintegrating tablet Take 1 tablet by mouth every 8 hours as needed for Nausea 90 tablet 2    Fluticasone-Salmeterol (AIRDUO RESPICLICK 455/51) 518-44 MCG/ACT AEPB Inhale 1 puff into the lungs 2 times daily 1 each 0    Probiotic Product (PROBIOTIC DAILY PO) Take by mouth daily      calcium carbonate (TUMS) 500 MG chewable tablet Take 1 tablet by mouth daily.  Multiple Vitamin (MULTI-VITAMIN DAILY PO) Take  by mouth. No current facility-administered medications for this visit. Goals of current treatment regimen include improvement in pain, restoration of functioning- with focus on improvement in physical performance, general activity, work or disability,emotional distress, health care utilization and  decreased medication consumption.  Will continue to monitor progress towards achieving/maintaining therapeutic goals with special emphasis on  1. Improvement in perceived interfernce  of pain with ADL's. Ability to do home exercises independently. Ability to do household chores indoor and/or outdoor work and social and leisure activities. To increase flexibility/ROM, strength and endurance. Improve psychosocial and physical functioning.- she is not showing any significant progress/or showing regression  towards this goal and reassessment and adjustment of goals/treatment have been made. 2. Improving sleep to 6-7 hours a night. Improve mood/ anxiety and depression symptoms such as crying spells, low energy, problems with concentration, motivation. - she is showing progression towards this treatment goal with the current regimen. 3. Reduction of reliance on opioid analgesia/more appropriate opioid use. - she is showing progression towards this treatment goal with the current regimen. 4.Ability to focus/concentrate at work and perform the duties required of her at work  Sit through a workday without lower extremity symptoms. Stand 30-60 minutes without lower extremity symptoms. Ability to lift up to 10-20 lbs. Ability to go up and down stairs. Sit 30-60 minutes  Without having to stand up frequently. - she is maintaining/progressing towards her work related goals with the current regimen. Risks and benefits of the medications and other alternative treatments have been/were  discussed with the patient. Any questions on the  common side effects of these medications were also answered. She was advised against drinking alcohol with the narcotic pain medicines, advised against driving or handling machinery when  starting or adjusting the dose of medicines, feeling groggy or drowsy, or if having any cognitive issues related to the current medications. Sheis fully aware of the risk of overdose and death, if medicines are misused and not taken as prescribed. If she develops new symptoms or if the symptoms worsen, she was told to call the office. .  Thank you for allowing me to participate in the care of this patient.     Raquel Sheriff MD    Cc: Jose Squires MD

## 2022-06-30 NOTE — TELEPHONE ENCOUNTER
Submitted PA for CELEBREX  Via [facility] Key: [id number] STATUS: APPROVED. The patient was notified by .

## 2022-07-12 ENCOUNTER — OFFICE VISIT (OUTPATIENT)
Dept: FAMILY MEDICINE CLINIC | Age: 37
End: 2022-07-12
Payer: COMMERCIAL

## 2022-07-12 VITALS
HEART RATE: 73 BPM | BODY MASS INDEX: 22.32 KG/M2 | OXYGEN SATURATION: 98 % | HEIGHT: 63 IN | WEIGHT: 126 LBS | SYSTOLIC BLOOD PRESSURE: 114 MMHG | DIASTOLIC BLOOD PRESSURE: 66 MMHG

## 2022-07-12 DIAGNOSIS — L70.0 ACNE VULGARIS: ICD-10-CM

## 2022-07-12 DIAGNOSIS — R11.0 NAUSEA: ICD-10-CM

## 2022-07-12 DIAGNOSIS — Z01.419 WELL WOMAN EXAM: ICD-10-CM

## 2022-07-12 DIAGNOSIS — Z00.00 ENCOUNTER FOR WELL ADULT EXAM WITHOUT ABNORMAL FINDINGS: Primary | ICD-10-CM

## 2022-07-12 DIAGNOSIS — J45.991 COUGH VARIANT ASTHMA: ICD-10-CM

## 2022-07-12 DIAGNOSIS — Z23 NEED FOR HEPATITIS B VACCINATION: ICD-10-CM

## 2022-07-12 DIAGNOSIS — F32.A DEPRESSION, UNSPECIFIED DEPRESSION TYPE: ICD-10-CM

## 2022-07-12 PROCEDURE — 99395 PREV VISIT EST AGE 18-39: CPT | Performed by: FAMILY MEDICINE

## 2022-07-12 PROCEDURE — 90471 IMMUNIZATION ADMIN: CPT | Performed by: FAMILY MEDICINE

## 2022-07-12 PROCEDURE — 90746 HEPB VACCINE 3 DOSE ADULT IM: CPT | Performed by: FAMILY MEDICINE

## 2022-07-12 RX ORDER — CLINDAMYCIN PHOSPHATE 10 MG/G
GEL TOPICAL
Qty: 60 G | Refills: 2 | Status: SHIPPED | OUTPATIENT
Start: 2022-07-12 | End: 2022-07-19

## 2022-07-12 RX ORDER — FLUTICASONE PROPIONATE AND SALMETEROL 232; 14 UG/1; UG/1
1 POWDER, METERED RESPIRATORY (INHALATION) 2 TIMES DAILY
Qty: 1 EACH | Refills: 0 | Status: SHIPPED | OUTPATIENT
Start: 2022-07-12

## 2022-07-12 RX ORDER — ONDANSETRON 4 MG/1
4 TABLET, ORALLY DISINTEGRATING ORAL EVERY 8 HOURS PRN
Qty: 90 TABLET | Refills: 2 | Status: SHIPPED | OUTPATIENT
Start: 2022-07-12

## 2022-07-12 RX ORDER — SERTRALINE HYDROCHLORIDE 25 MG/1
25 TABLET, FILM COATED ORAL DAILY
Qty: 30 TABLET | Refills: 5 | Status: SHIPPED | OUTPATIENT
Start: 2022-07-12

## 2022-07-12 ASSESSMENT — PATIENT HEALTH QUESTIONNAIRE - PHQ9
2. FEELING DOWN, DEPRESSED OR HOPELESS: 2
4. FEELING TIRED OR HAVING LITTLE ENERGY: 2
1. LITTLE INTEREST OR PLEASURE IN DOING THINGS: 1
SUM OF ALL RESPONSES TO PHQ QUESTIONS 1-9: 9
5. POOR APPETITE OR OVEREATING: 0
SUM OF ALL RESPONSES TO PHQ9 QUESTIONS 1 & 2: 3
9. THOUGHTS THAT YOU WOULD BE BETTER OFF DEAD, OR OF HURTING YOURSELF: 0
SUM OF ALL RESPONSES TO PHQ QUESTIONS 1-9: 9
10. IF YOU CHECKED OFF ANY PROBLEMS, HOW DIFFICULT HAVE THESE PROBLEMS MADE IT FOR YOU TO DO YOUR WORK, TAKE CARE OF THINGS AT HOME, OR GET ALONG WITH OTHER PEOPLE: 1
3. TROUBLE FALLING OR STAYING ASLEEP: 1
6. FEELING BAD ABOUT YOURSELF - OR THAT YOU ARE A FAILURE OR HAVE LET YOURSELF OR YOUR FAMILY DOWN: 2
7. TROUBLE CONCENTRATING ON THINGS, SUCH AS READING THE NEWSPAPER OR WATCHING TELEVISION: 1
SUM OF ALL RESPONSES TO PHQ QUESTIONS 1-9: 9
8. MOVING OR SPEAKING SO SLOWLY THAT OTHER PEOPLE COULD HAVE NOTICED. OR THE OPPOSITE, BEING SO FIGETY OR RESTLESS THAT YOU HAVE BEEN MOVING AROUND A LOT MORE THAN USUAL: 0
SUM OF ALL RESPONSES TO PHQ QUESTIONS 1-9: 9

## 2022-07-12 NOTE — PROGRESS NOTES
Well Adult Note  Name: Julissa Moreno Date: 2022   MRN: [id number] Sex: Female   Age: 39 y.o. Ethnicity: Non- / Non    : 1985 Race:   Native [place]  14. / Corey Oviedos  Other  Two or More Races      Garrison Neri is here for well adult exam.  History:  Patient presents for complete physical exam today. No acute complaints. Review of Systems   Constitutional:  Negative for fatigue and unexpected weight change. HENT:  Negative for congestion, rhinorrhea and trouble swallowing. Eyes:  Negative for pain and visual disturbance. Respiratory:  Negative for chest tightness and shortness of breath. Cardiovascular:  Negative for chest pain and palpitations. Gastrointestinal:  Negative for constipation and diarrhea. Endocrine: Negative for cold intolerance and heat intolerance. Genitourinary:  Negative for frequency and urgency. Musculoskeletal:  Negative for arthralgias and back pain. Skin:  Negative for color change and rash. Allergic/Immunologic: Negative for environmental allergies and food allergies. Neurological:  Negative for dizziness and light-headedness. Hematological:  Negative for adenopathy. Does not bruise/bleed easily. Psychiatric/Behavioral:  Negative for dysphoric mood and sleep disturbance. Allergies   Allergen Reactions    Cymbalta [Duloxetine Hcl]      Fatigue, wt gain       Prior to Visit Medications    Medication Sig Taking? Authorizing Provider   oxyCODONE-acetaminophen (PERCOCET) 5-325 MG per tablet Take 0.5 tablets by mouth every 6 hours as needed for Pain (max 1-2 per day) for up to 56 days.  Yes Gustavo Loya MD   methocarbamol (ROBAXIN) 500 MG tablet Take 1 tablet by mouth 2 times daily Yes Gustavo Loya MD   gabapentin (NEURONTIN) 400 MG capsule Take 1 capsule po in AM, take 2 capsule po Afternoon, take 2 capsules po in PM Yes Gustavo Loya MD   celecoxib (CELEBREX) 200 MG capsule Take 1 capsule by mouth daily Yes Colten Elise MD   Probiotic Product (PROBIOTIC DAILY PO) Take by mouth daily Yes Historical Provider, MD   calcium carbonate (TUMS) 500 MG chewable tablet Take 1 tablet by mouth daily. Yes Historical Provider, MD   Multiple Vitamin (MULTI-VITAMIN DAILY PO) Take  by mouth. Yes Historical Provider, MD   ondansetron (ZOFRAN-ODT) 4 MG disintegrating tablet Take 1 tablet by mouth every 8 hours as needed for Nausea  Patient not taking: Reported on 7/12/2022  Kirill Rowell MD   Fluticasone-Salmeterol (AIRDUO RESPICLICK 140/72) 866-09 MCG/ACT AEPB Inhale 1 puff into the lungs 2 times daily  Patient not taking: Reported on 7/12/2022  Kirill Rowell MD       Past Medical History:   Diagnosis Date    Chronic myofascial pain     Dr. Dulce Herrera for pain control    Chronic pelvic pain in female     pelvic floor dysfunction    Depression     Endometriosis     hx of lupron/depo; surgery. hx of OCP trials. Seizure disorder [facility])     neurology (while in [place]). off seizure meds since 2010, stable. Past Surgical History:   Procedure Laterality Date    ABDOMEN SURGERY  2008, 2012    diagnostic laparascopy, scar tissue, endometriosis    LASIK      TYMPANOSTOMY TUBE PLACEMENT         Family History   Adopted: Yes       Social History     Tobacco Use    Smoking status: Former Smoker     Types: E-Cigarettes    Smokeless tobacco: Never Used    Tobacco comment: occasional eCIG   Vaping Use    Vaping Use: Some days   Substance Use Topics    Alcohol use: No     Alcohol/week: 0.0 standard drinks    Drug use: No       Objective   /66   Pulse 73   Ht 5' 3.25\" (1.607 m)   Wt 126 lb (57.2 kg)   SpO2 98%   BMI 22.14 kg/m²   Wt Readings from Last 3 Encounters:   07/12/22 126 lb (57.2 kg)   06/30/22 130 lb 12.8 oz (59.3 kg)   05/19/22 131 lb 9.6 oz (59.7 kg)     There were no vitals filed for this visit. Physical Exam  Vitals and nursing note reviewed. Constitutional:       Appearance: Normal appearance. She is well-developed. HENT:      Head: Normocephalic and atraumatic. Right Ear: External ear normal.      Left Ear: External ear normal.      Nose: Nose normal.   Eyes:      Conjunctiva/sclera: Conjunctivae normal.      Pupils: Pupils are equal, round, and reactive to light. Cardiovascular:      Rate and Rhythm: Normal rate and regular rhythm. Heart sounds: Normal heart sounds. Pulmonary:      Effort: Pulmonary effort is normal.      Breath sounds: Normal breath sounds. Abdominal:      General: Bowel sounds are normal.      Palpations: Abdomen is soft. Musculoskeletal:         General: Normal range of motion. Cervical back: Normal range of motion and neck supple. Skin:     General: Skin is dry. Neurological:      Mental Status: She is alert and oriented to person, place, and time. Deep Tendon Reflexes: Reflexes are normal and symmetric. Psychiatric:         Behavior: Behavior normal.         Thought Content: Thought content normal.         Judgment: Judgment normal.         Assessment   Plan   1. Encounter for well adult exam without abnormal findings  -     Quantiferon, Incubated; Future  2. Depression, unspecified depression type  -     sertraline (ZOLOFT) 25 MG tablet; Take 1 tablet by mouth daily, Disp-30 tablet, R-5Normal  3. Cough variant asthma  -     Fluticasone-Salmeterol (AIRDUO RESPICLICK 117/53) 719-75 MCG/ACT AEPB; Inhale 1 puff into the lungs 2 times daily, Disp-1 each, R-0Normal  4. Nausea  -     ondansetron (ZOFRAN-ODT) 4 MG disintegrating tablet; Take 1 tablet by mouth every 8 hours as needed for Nausea, Disp-90 tablet, R-2Normal  5. Acne vulgaris  -     Benzoyl Peroxide 2.5 % CREA; Apply 1 applicator topically every evening, Disp-21 g, R-0Normal  -     clindamycin (CLEOCIN-T) 1 % gel; Apply topically 2 times daily. , Disp-60 g, R-2, Normal  -     [facility] - Naldo Gonzalez DO, [facility]  6.  Well woman exam  -     [facility] [address], [facility], [place], Obstetrics, [place]  7. Need for hepatitis B vaccination  -     Hep B, ENGERIX-B, (age 21 yrs+), IM, 1mL, 3-dose       Personalized Preventive Plan   Current Health Maintenance Status  Immunization History   Administered Date(s) Administered    COVID-19, PFIZER PURPLE top, DILUTE for use, (age 15 y+), 30mcg/0.3mL 06/17/2021, 07/14/2021    Influenza, MDCK Quadv, IM, PF (Flucelvax 2 yrs and older) 10/04/2021    Tdap (Boostrix, Adacel) 10/04/2021        Health Maintenance   Topic Date Due    Varicella vaccine (1 of 2 - 2-dose childhood series) Never done    Depression Monitoring  Never done    HIV screen  Never done    Hepatitis C screen  Never done    Cervical cancer screen  Never done    COVID-19 Vaccine (3 - Booster for Wilson Peter series) 12/14/2021    Flu vaccine (1) 09/01/2022    DTaP/Tdap/Td vaccine (2 - Td or Tdap) 10/04/2031    Hepatitis A vaccine  Aged Out    Hepatitis B vaccine  Aged Out    Hib vaccine  Aged Out    Meningococcal (ACWY) vaccine  Aged Out    Pneumococcal 0-64 years Vaccine  Aged Out     Recommendations for NovImmune Due: see orders and patient instructions/AVS.    No follow-ups on file.

## 2022-07-12 NOTE — LETTER
[facility]  [address] [place] [address]  Phone: [phone]  Fax: [phone]    Ghulam Ramos MD        July 12, 2022     Patient: Delray Gosselin   YOB: 1985   Date of Visit: 7/12/2022       To Whom it May Concern:    Delray Gosselin was seen in my clinic on 7/12/2022. She is on prescription medicines including one oxycodone containing medicine. She is fully functional with taking any of her medicines. They do not affect judgement or mental status. If you have any questions or concerns, please don't hesitate to call.     Sincerely,         Ghulam Ramos MD

## 2022-07-13 ENCOUNTER — TELEPHONE (OUTPATIENT)
Dept: FAMILY MEDICINE CLINIC | Age: 37
End: 2022-07-13

## 2022-07-13 NOTE — TELEPHONE ENCOUNTER
Received call from Giselle stating that Benzoyl Peroxide 2.5 % CREAM isn't available but a lotion is.

## 2022-07-14 DIAGNOSIS — Z00.00 ENCOUNTER FOR WELL ADULT EXAM WITHOUT ABNORMAL FINDINGS: ICD-10-CM

## 2022-07-14 RX ORDER — FLUTICASONE PROPIONATE AND SALMETEROL 250; 50 UG/1; UG/1
1 POWDER RESPIRATORY (INHALATION) EVERY 12 HOURS
Qty: 60 EACH | Refills: 3 | Status: SHIPPED | OUTPATIENT
Start: 2022-07-14

## 2022-07-19 ENCOUNTER — TELEPHONE (OUTPATIENT)
Dept: FAMILY MEDICINE CLINIC | Age: 37
End: 2022-07-19

## 2022-07-19 LAB
QUANTI TB GOLD PLUS: NEGATIVE
QUANTI TB1 MINUS NIL: 0 IU/ML (ref 0–0.34)
QUANTI TB2 MINUS NIL: 0 IU/ML (ref 0–0.34)
QUANTIFERON MITOGEN: 8.33 IU/ML
QUANTIFERON NIL: 0.01 IU/ML

## 2022-07-19 ASSESSMENT — ENCOUNTER SYMPTOMS
CHEST TIGHTNESS: 0
EYE PAIN: 0
SHORTNESS OF BREATH: 0
CONSTIPATION: 0
RHINORRHEA: 0
DIARRHEA: 0
TROUBLE SWALLOWING: 0
BACK PAIN: 0
COLOR CHANGE: 0

## 2022-07-19 NOTE — TELEPHONE ENCOUNTER
----- Message from Donnie Avelar sent at 7/19/2022  8:38 AM EDT -----  Subject: Message to Provider    QUESTIONS  Information for Provider? Patient called to see if she could  the   forms she dropped off last week. Patient has one more form to have the Dr   sign off on for the tuberculosis test. Please call patient  ---------------------------------------------------------------------------  --------------  Jerzy LARSON  [phone]; OK to leave message on voicemail  ---------------------------------------------------------------------------  --------------  SCRIPT ANSWERS  Relationship to Patient?  Self

## 2022-08-03 DIAGNOSIS — M79.7 FIBROMYALGIA: ICD-10-CM

## 2022-08-03 DIAGNOSIS — G89.4 CHRONIC PAIN SYNDROME: ICD-10-CM

## 2022-08-03 DIAGNOSIS — N94.89 PELVIC PAIN SYNDROME: ICD-10-CM

## 2022-08-03 DIAGNOSIS — G89.29 CHRONIC PELVIC PAIN IN FEMALE: ICD-10-CM

## 2022-08-03 DIAGNOSIS — N80.9 ENDOMETRIOSIS: ICD-10-CM

## 2022-08-03 DIAGNOSIS — R10.2 CHRONIC PELVIC PAIN IN FEMALE: ICD-10-CM

## 2022-08-22 ENCOUNTER — OFFICE VISIT (OUTPATIENT)
Dept: PAIN MANAGEMENT | Age: 37
End: 2022-08-22
Payer: COMMERCIAL

## 2022-08-22 VITALS
WEIGHT: 130 LBS | HEIGHT: 63 IN | DIASTOLIC BLOOD PRESSURE: 69 MMHG | SYSTOLIC BLOOD PRESSURE: 105 MMHG | OXYGEN SATURATION: 99 % | HEART RATE: 62 BPM | BODY MASS INDEX: 23.04 KG/M2

## 2022-08-22 DIAGNOSIS — N80.9 ENDOMETRIOSIS: ICD-10-CM

## 2022-08-22 DIAGNOSIS — G89.29 CHRONIC PELVIC PAIN IN FEMALE: ICD-10-CM

## 2022-08-22 DIAGNOSIS — M79.7 FIBROMYALGIA: ICD-10-CM

## 2022-08-22 DIAGNOSIS — R10.2 CHRONIC PELVIC PAIN IN FEMALE: ICD-10-CM

## 2022-08-22 DIAGNOSIS — Z51.81 ENCOUNTER FOR THERAPEUTIC DRUG MONITORING: ICD-10-CM

## 2022-08-22 DIAGNOSIS — G89.4 CHRONIC PAIN SYNDROME: ICD-10-CM

## 2022-08-22 DIAGNOSIS — N94.89 PELVIC PAIN SYNDROME: ICD-10-CM

## 2022-08-22 PROCEDURE — 99213 OFFICE O/P EST LOW 20 MIN: CPT | Performed by: INTERNAL MEDICINE

## 2022-08-22 RX ORDER — METHOCARBAMOL 500 MG/1
500 TABLET, FILM COATED ORAL 2 TIMES DAILY
Qty: 60 TABLET | Refills: 1 | Status: SHIPPED | OUTPATIENT
Start: 2022-08-22 | End: 2022-10-20 | Stop reason: SDUPTHER

## 2022-08-22 RX ORDER — OXYCODONE HYDROCHLORIDE AND ACETAMINOPHEN 5; 325 MG/1; MG/1
0.5 TABLET ORAL EVERY 6 HOURS PRN
Qty: 56 TABLET | Refills: 0 | Status: SHIPPED | OUTPATIENT
Start: 2022-08-22 | End: 2022-10-20 | Stop reason: SDUPTHER

## 2022-08-22 RX ORDER — CELECOXIB 200 MG/1
200 CAPSULE ORAL DAILY
Qty: 30 CAPSULE | Refills: 0 | OUTPATIENT
Start: 2022-08-22

## 2022-08-22 RX ORDER — GABAPENTIN 400 MG/1
CAPSULE ORAL
Qty: 150 CAPSULE | Refills: 1 | Status: SHIPPED | OUTPATIENT
Start: 2022-08-22 | End: 2022-10-20 | Stop reason: SDUPTHER

## 2022-08-22 RX ORDER — CELECOXIB 200 MG/1
200 CAPSULE ORAL DAILY
Qty: 30 CAPSULE | Refills: 0 | Status: SHIPPED | OUTPATIENT
Start: 2022-08-22 | End: 2022-10-20 | Stop reason: SDUPTHER

## 2022-08-22 NOTE — PROGRESS NOTES
[facility] [place]  1985  [id number]      HISTORY OF PRESENT ILLNESS:  Ms. Milana Chan is a 40 y.o. female returns for a follow up visit for pain management  She has a diagnosis of   1. Chronic pain syndrome    2. Endometriosis    3. Fibromyalgia    4. Pelvic pain syndrome    5. Chronic pelvic pain in female    6. Encounter for therapeutic drug monitoring    . She complains of pain in the  upper back, mid back, lower back, bilateral elbows, bilateral knees with radiation to the bilateral arms, abdomen, bilateral  hands  She rates the pain 8/10 and describes it as sharp, aching, burning. Current treatment regimen has helped relieve about 30% of the pain. She denies any side effects from the current pain regimen. Patient reports that since the last follow up visit the physical functioning is worse, family/social relationships are unchanged, mood is unchanged sleep patterns are worse, and that the overall functioning is unchanged. Patient denies misusing/abusing her narcotic pain medications or using any illegal drugs. There are No indicators for possible drug abuse, addiction or diversion problems, patient states she has been doing fair. Ms. Milana Chan states she started using Celebrex but not helping as well as Naprosyn. Patient denies any GI issues. She mentions she is using Percocet 1/2 tablet BID. 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 ALLERGIES: Patients list of allergies were reviewed     MEDICATIONS: Ms. Milana Chan list of medications were reviewed. Her current medications are   Outpatient Medications Prior to Visit   Medication Sig Dispense Refill    fluticasone-salmeterol (ADVAIR) 250-50 MCG/ACT AEPB diskus inhaler Inhale 1 puff into the lungs every 12 hours 60 each 3    sertraline (ZOLOFT) 25 MG tablet Take 1 tablet by mouth daily 30 tablet 5    Fluticasone-Salmeterol (AIRDUO RESPICLICK 530/03) 566-51 MCG/ACT AEPB Inhale 1 puff into the lungs 2 times daily 1 each 0    ondansetron (ZOFRAN-ODT) 4 MG disintegrating tablet Take 1 tablet by mouth every 8 hours as needed for Nausea 90 tablet 2    Benzoyl Peroxide 2.5 % CREA Apply 1 applicator topically every evening 21 g 0    Probiotic Product (PROBIOTIC DAILY PO) Take by mouth daily      calcium carbonate (TUMS) 500 MG chewable tablet Take 1 tablet by mouth daily. Multiple Vitamin (MULTI-VITAMIN DAILY PO) Take  by mouth. oxyCODONE-acetaminophen (PERCOCET) 5-325 MG per tablet Take 0.5 tablets by mouth every 6 hours as needed for Pain (max 1-2 per day) for up to 56 days. 60 tablet 0    methocarbamol (ROBAXIN) 500 MG tablet Take 1 tablet by mouth 2 times daily 60 tablet 1    gabapentin (NEURONTIN) 400 MG capsule Take 1 capsule po in AM, take 2 capsule po Afternoon, take 2 capsules po in  capsule 1    celecoxib (CELEBREX) 200 MG capsule Take 1 capsule by mouth daily 30 capsule 0     No facility-administered medications prior to visit. REVIEW OF SYSTEMS:    Respiratory: Negative for apnea, chest tightness and shortness of breath or change in baseline breathing. PHYSICAL EXAM:   Nursing note and vitals reviewed. /69   Pulse 62   Ht 5' 3.25\" (1.607 m)   Wt 130 lb (59 kg)   SpO2 99%   BMI 22.85 kg/m²   Constitutional: She appears well-developed and well-nourished. No acute distress. Cardiovascular: Normal rate, regular rhythm, normal heart sounds, and does not have murmur. Pulmonary/Chest: Effort normal. No respiratory distress. She does not have wheezes in the lung fields. She has no rales. Neurological/Psychiatric:She is alert and oriented to person, place, and time. Coordination is  normal.  Her mood isAppropriate and affect is Neutral/Euthymic(normal) . Her    IMPRESSION:   1. Chronic pain syndrome    2. Endometriosis    3. Fibromyalgia    4. Pelvic pain syndrome    5. Chronic pelvic pain in female    6.  Encounter for therapeutic drug monitoring PLAN:  Informed verbal consent was obtained  -ROM/Stretching exercises as advised   -Continue with Percocet 1-2 per day  -She was advised to increase fluids ( 5-7  glasses of fluid daily), limit caffeine, avoid cheese products, increase dietary fiber, increase activity and exercise as tolerated and relax regularly and enjoy meals   -Continue with all other adjuvant medications as before    Current Outpatient Medications   Medication Sig Dispense Refill    oxyCODONE-acetaminophen (PERCOCET) 5-325 MG per tablet Take 0.5 tablets by mouth every 6 hours as needed for Pain (max 1-2 per day) for up to 56 days. 56 tablet 0    methocarbamol (ROBAXIN) 500 MG tablet Take 1 tablet by mouth 2 times daily 60 tablet 1    gabapentin (NEURONTIN) 400 MG capsule Take 1 capsule po in AM, take 2 capsule po Afternoon, take 2 capsules po in  capsule 1    celecoxib (CELEBREX) 200 MG capsule Take 1 capsule by mouth daily 30 capsule 0    fluticasone-salmeterol (ADVAIR) 250-50 MCG/ACT AEPB diskus inhaler Inhale 1 puff into the lungs every 12 hours 60 each 3    sertraline (ZOLOFT) 25 MG tablet Take 1 tablet by mouth daily 30 tablet 5    Fluticasone-Salmeterol (AIRDUO RESPICLICK 082/37) 363-63 MCG/ACT AEPB Inhale 1 puff into the lungs 2 times daily 1 each 0    ondansetron (ZOFRAN-ODT) 4 MG disintegrating tablet Take 1 tablet by mouth every 8 hours as needed for Nausea 90 tablet 2    Benzoyl Peroxide 2.5 % CREA Apply 1 applicator topically every evening 21 g 0    Probiotic Product (PROBIOTIC DAILY PO) Take by mouth daily      calcium carbonate (TUMS) 500 MG chewable tablet Take 1 tablet by mouth daily. Multiple Vitamin (MULTI-VITAMIN DAILY PO) Take  by mouth. No current facility-administered medications for this visit. I will continue her current medication regimen  which is part of the above treatment schedule. It has been helping with Ms. Shona Torres's chronic  medical problems which for this visit include: Diagnoses of Chronic pain syndrome, Endometriosis, Fibromyalgia, Pelvic pain syndrome, Chronic pelvic pain in female, and Encounter for therapeutic drug monitoring were pertinent to this visit. Risks and benefits of the medications and other alternative treatments  including no treatment were discussed with the patient. The common side effects of these medications were also explained to the patient. Informed verbal consent was obtained. Goals of current treatment regimen include improvement in pain, restoration of functioning- with focus on improvement in physical performance, general activity, work or disability,emotional distress, health care utilization and  decreased medication consumption. Will continue to monitor progress towards achieving/maintaining therapeutic goals with special emphasis on  1. Improvement in perceived interfernce  of pain with ADL's. Ability to do home exercises independently. Ability to do household chores indoor and/or outdoor work and social and leisure activities. Improve psychosocial and physical functioning. - she is showing progression towards this treatment goal with the current regimen. She was advised against drinking alcohol with the narcotic pain medicines, advised against driving or handling machinery while adjusting the dose of medicines or if having cognitive  issues related to the current medications. Risk of overdose and death, if medicines not taken as prescribed, were also discussed. If the patient develops new symptoms or if the symptoms worsen, the patient should call the office. While transcribing every attempt was made to maintain the accuracy of the note in terms of it's contents,there may have been some errors made inadvertently. Thank you for allowing me to participate in the care of this patient.     Tim Johnson MD.    Cc: Sam Angela MD

## 2022-09-02 DIAGNOSIS — R10.2 CHRONIC PELVIC PAIN IN FEMALE: ICD-10-CM

## 2022-09-02 DIAGNOSIS — N80.9 ENDOMETRIOSIS: ICD-10-CM

## 2022-09-02 DIAGNOSIS — G89.4 CHRONIC PAIN SYNDROME: ICD-10-CM

## 2022-09-02 DIAGNOSIS — G89.29 CHRONIC PELVIC PAIN IN FEMALE: ICD-10-CM

## 2022-09-02 DIAGNOSIS — N94.89 PELVIC PAIN SYNDROME: ICD-10-CM

## 2022-09-02 DIAGNOSIS — M79.7 FIBROMYALGIA: ICD-10-CM

## 2022-09-30 RX ORDER — GABAPENTIN 400 MG/1
CAPSULE ORAL
Qty: 150 CAPSULE | Refills: 1 | OUTPATIENT
Start: 2022-09-30

## 2022-10-20 ENCOUNTER — OFFICE VISIT (OUTPATIENT)
Dept: PAIN MANAGEMENT | Age: 37
End: 2022-10-20
Payer: COMMERCIAL

## 2022-10-20 VITALS
DIASTOLIC BLOOD PRESSURE: 71 MMHG | SYSTOLIC BLOOD PRESSURE: 111 MMHG | HEART RATE: 61 BPM | BODY MASS INDEX: 23.37 KG/M2 | WEIGHT: 133 LBS

## 2022-10-20 DIAGNOSIS — N80.9 ENDOMETRIOSIS: ICD-10-CM

## 2022-10-20 DIAGNOSIS — G89.4 CHRONIC PAIN SYNDROME: ICD-10-CM

## 2022-10-20 DIAGNOSIS — M79.7 FIBROMYALGIA: ICD-10-CM

## 2022-10-20 DIAGNOSIS — N94.89 PELVIC PAIN SYNDROME: ICD-10-CM

## 2022-10-20 DIAGNOSIS — G89.29 CHRONIC PELVIC PAIN IN FEMALE: ICD-10-CM

## 2022-10-20 DIAGNOSIS — R10.2 CHRONIC PELVIC PAIN IN FEMALE: ICD-10-CM

## 2022-10-20 PROCEDURE — 99213 OFFICE O/P EST LOW 20 MIN: CPT | Performed by: INTERNAL MEDICINE

## 2022-10-20 RX ORDER — OXYCODONE HYDROCHLORIDE AND ACETAMINOPHEN 5; 325 MG/1; MG/1
0.5 TABLET ORAL EVERY 6 HOURS PRN
Qty: 56 TABLET | Refills: 0 | Status: SHIPPED | OUTPATIENT
Start: 2022-10-20 | End: 2022-12-15

## 2022-10-20 RX ORDER — GABAPENTIN 400 MG/1
CAPSULE ORAL
Qty: 150 CAPSULE | Refills: 1 | Status: SHIPPED | OUTPATIENT
Start: 2022-10-20 | End: 2022-12-12

## 2022-10-20 RX ORDER — METHOCARBAMOL 500 MG/1
500 TABLET, FILM COATED ORAL 2 TIMES DAILY
Qty: 60 TABLET | Refills: 1 | Status: SHIPPED | OUTPATIENT
Start: 2022-10-20

## 2022-10-20 RX ORDER — CELECOXIB 200 MG/1
200 CAPSULE ORAL DAILY
Qty: 30 CAPSULE | Refills: 1 | Status: SHIPPED | OUTPATIENT
Start: 2022-10-20

## 2022-10-20 NOTE — PROGRESS NOTES
Angelia Genao  1985  [id number]      HISTORY OF PRESENT ILLNESS:  Ms. Perfecto Mcclendon is a 40 y.o. female returns for a follow up visit for pain management  She has a diagnosis of   1. Chronic pain syndrome    2. Endometriosis    3. Fibromyalgia    4. Pelvic pain syndrome    5. Chronic pelvic pain in female    . She complains of pain in the  neck, upper back, lower back, bilateral elbows, bilateral knees with radiation to the bilateral hands, bilateral shoulders, bilateral lower leg  She rates the pain 7/10 and describes it as sharp, aching, numbness. Current treatment regimen has helped relieve about 30% of the pain. She denies any side effects from the current pain regimen. Patient reports that since the last follow up visit the physical functioning is worse, family/social relationships are better, mood is unchanged sleep patterns are worse, and that the overall functioning is worse. Patient denies misusing/abusing her narcotic pain medications or using any illegal drugs. There are No indicators for possible drug abuse, addiction or diversion problems, patient states she has been doing fair, her pain has been baseline. Ms. Perfecto Mcclendon mentions she is using Percocet 1/2 tablet po BID along with the other adjuvants. She says she is using Celebrex along with Neurontin. Patient denies any constipation symptoms. ALLERGIES: Patients list of allergies were reviewed     MEDICATIONS: Ms. Perfecto Mcclendon list of medications were reviewed. Her current medications are   Outpatient Medications Prior to Visit   Medication Sig Dispense Refill    methocarbamol (ROBAXIN) 500 MG tablet Take 1 tablet by mouth 2 times daily 60 tablet 1    celecoxib (CELEBREX) 200 MG capsule Take 1 capsule by mouth daily 30 capsule 0    fluticasone-salmeterol (ADVAIR) 250-50 MCG/ACT AEPB diskus inhaler Inhale 1 puff into the lungs every 12 hours 60 each 3    sertraline (ZOLOFT) 25 MG tablet Take 1 tablet by mouth daily 30 tablet 5 Fluticasone-Salmeterol (AIRDUO RESPICLICK 154/72) 355-32 MCG/ACT AEPB Inhale 1 puff into the lungs 2 times daily 1 each 0    ondansetron (ZOFRAN-ODT) 4 MG disintegrating tablet Take 1 tablet by mouth every 8 hours as needed for Nausea 90 tablet 2    Benzoyl Peroxide 2.5 % CREA Apply 1 applicator topically every evening 21 g 0    Probiotic Product (PROBIOTIC DAILY PO) Take by mouth daily      calcium carbonate (TUMS) 500 MG chewable tablet Take 1 tablet by mouth daily. Multiple Vitamin (MULTI-VITAMIN DAILY PO) Take  by mouth.      gabapentin (NEURONTIN) 400 MG capsule Take 1 capsule po in AM, take 2 capsule po Afternoon, take 2 capsules po in  capsule 1     No facility-administered medications prior to visit. REVIEW OF SYSTEMS:    Respiratory: Negative for apnea, chest tightness and shortness of breath or change in baseline breathing. PHYSICAL EXAM:   Nursing note and vitals reviewed. /71   Pulse 61   Wt 133 lb (60.3 kg)   BMI 23.37 kg/m²   Constitutional: She appears well-developed and well-nourished. No acute distress. Cardiovascular: Normal rate, regular rhythm, normal heart sounds, and does not have murmur. Pulmonary/Chest: Effort normal. No respiratory distress. She does not have wheezes in the lung fields. She has no rales. Neurological/Psychiatric:She is alert and oriented to person, place, and time. Coordination is  normal.  Her mood isAppropriate and affect is Neutral/Euthymic(normal) . Her    IMPRESSION:   1. Chronic pain syndrome    2. Endometriosis    3. Fibromyalgia    4. Pelvic pain syndrome    5. Chronic pelvic pain in female        PLAN:  Informed verbal consent was obtained  -[place] record was obtained and reviewed  for the last one year and no indicators of drug misuse  were found. Any other controlled substance prescriptions  seen on the record have been accounted for, I am aware of the patient receiving these medications. Cinthia Rocha  [place] record will be rechecked as part of office protocol. -ROM/Stretching exercises as advised   -She was advised to increase fluids ( 5-7  glasses of fluid daily), limit caffeine, avoid cheese products, increase dietary fiber, increase activity and exercise as tolerated and relax regularly and enjoy meals   -Continue with Percocet 1/2 BID  -Continue with Neurontin same dose  -Labs to be done with her PCP   Current Outpatient Medications   Medication Sig Dispense Refill    methocarbamol (ROBAXIN) 500 MG tablet Take 1 tablet by mouth 2 times daily 60 tablet 1    celecoxib (CELEBREX) 200 MG capsule Take 1 capsule by mouth daily 30 capsule 0    fluticasone-salmeterol (ADVAIR) 250-50 MCG/ACT AEPB diskus inhaler Inhale 1 puff into the lungs every 12 hours 60 each 3    sertraline (ZOLOFT) 25 MG tablet Take 1 tablet by mouth daily 30 tablet 5    Fluticasone-Salmeterol (AIRDUO RESPICLICK 268/13) 241-59 MCG/ACT AEPB Inhale 1 puff into the lungs 2 times daily 1 each 0    ondansetron (ZOFRAN-ODT) 4 MG disintegrating tablet Take 1 tablet by mouth every 8 hours as needed for Nausea 90 tablet 2    Benzoyl Peroxide 2.5 % CREA Apply 1 applicator topically every evening 21 g 0    Probiotic Product (PROBIOTIC DAILY PO) Take by mouth daily      calcium carbonate (TUMS) 500 MG chewable tablet Take 1 tablet by mouth daily. Multiple Vitamin (MULTI-VITAMIN DAILY PO) Take  by mouth.      gabapentin (NEURONTIN) 400 MG capsule Take 1 capsule po in AM, take 2 capsule po Afternoon, take 2 capsules po in  capsule 1     No current facility-administered medications for this visit. I will continue her current medication regimen  which is part of the above treatment schedule. It has been helping with Ms. Juan Jackson chronic  medical problems which for this visit include:   Diagnoses of Chronic pain syndrome, Endometriosis, Fibromyalgia, Pelvic pain syndrome, and Chronic pelvic pain in female were pertinent to this visit.    Risks and benefits of the medications and other alternative treatments  including no treatment were discussed with the patient. The common side effects of these medications were also explained to the patient. Informed verbal consent was obtained. Goals of current treatment regimen include improvement in pain, restoration of functioning- with focus on improvement in physical performance, general activity, work or disability,emotional distress, health care utilization and  decreased medication consumption. Will continue to monitor progress towards achieving/maintaining therapeutic goals with special emphasis on  1. Improvement in perceived interfernce  of pain with ADL's. Ability to do home exercises independently. Ability to do household chores indoor and/or outdoor work and social and leisure activities. Improve psychosocial and physical functioning. - she is showing progression towards this treatment goal with the current regimen. She was advised against drinking alcohol with the narcotic pain medicines, advised against driving or handling machinery while adjusting the dose of medicines or if having cognitive  issues related to the current medications. Risk of overdose and death, if medicines not taken as prescribed, were also discussed. If the patient develops new symptoms or if the symptoms worsen, the patient should call the office. While transcribing every attempt was made to maintain the accuracy of the note in terms of it's contents,there may have been some errors made inadvertently. Thank you for allowing me to participate in the care of this patient.     Ho Rodríguez MD.    Cc: Pepe Maki MD

## 2022-10-25 ENCOUNTER — TELEPHONE (OUTPATIENT)
Dept: PAIN MANAGEMENT | Age: 37
End: 2022-10-25

## 2022-12-15 ENCOUNTER — OFFICE VISIT (OUTPATIENT)
Dept: PAIN MANAGEMENT | Age: 37
End: 2022-12-15
Payer: COMMERCIAL

## 2022-12-15 VITALS
SYSTOLIC BLOOD PRESSURE: 112 MMHG | HEART RATE: 68 BPM | BODY MASS INDEX: 23.55 KG/M2 | WEIGHT: 134 LBS | DIASTOLIC BLOOD PRESSURE: 71 MMHG

## 2022-12-15 DIAGNOSIS — N94.89 PELVIC PAIN SYNDROME: ICD-10-CM

## 2022-12-15 DIAGNOSIS — G89.4 CHRONIC PAIN SYNDROME: ICD-10-CM

## 2022-12-15 DIAGNOSIS — M79.7 FIBROMYALGIA: ICD-10-CM

## 2022-12-15 DIAGNOSIS — N80.9 ENDOMETRIOSIS: ICD-10-CM

## 2022-12-15 PROCEDURE — 99213 OFFICE O/P EST LOW 20 MIN: CPT | Performed by: INTERNAL MEDICINE

## 2022-12-15 RX ORDER — GABAPENTIN 400 MG/1
CAPSULE ORAL
Qty: 150 CAPSULE | Refills: 1 | Status: SHIPPED | OUTPATIENT
Start: 2022-12-15 | End: 2023-02-06

## 2022-12-15 RX ORDER — OXYCODONE HYDROCHLORIDE AND ACETAMINOPHEN 5; 325 MG/1; MG/1
0.5 TABLET ORAL EVERY 6 HOURS PRN
Qty: 56 TABLET | Refills: 0 | Status: SHIPPED | OUTPATIENT
Start: 2022-12-15 | End: 2023-02-09

## 2022-12-15 RX ORDER — CELECOXIB 200 MG/1
200 CAPSULE ORAL DAILY PRN
Qty: 30 CAPSULE | Refills: 0 | Status: SHIPPED | OUTPATIENT
Start: 2022-12-15

## 2022-12-15 RX ORDER — METHOCARBAMOL 500 MG/1
500 TABLET, FILM COATED ORAL 2 TIMES DAILY PRN
Qty: 45 TABLET | Refills: 0 | Status: SHIPPED | OUTPATIENT
Start: 2022-12-15

## 2022-12-15 NOTE — PROGRESS NOTES
[facility]  1985  [id number]      HISTORY OF PRESENT ILLNESS:  Ms. Sonny Ford is a 40 y.o. female returns for a follow up visit for pain management  She has a diagnosis of   1. Chronic pain syndrome    2. Endometriosis    3. Fibromyalgia    4. Pelvic pain syndrome    5. Chronic pelvic pain in female    . She complains of pain in the  bilateral shoulders, bilateral elbows, lower back, bilateral knees with radiation to the bilateral hands, abdomen  She rates the pain 7/10 and describes it as aching, numbness. Current treatment regimen has helped relieve about 30% of the pain. She denies any side effects from the current pain regimen. Patient reports that since the last follow up visit the physical functioning is worse, family/social relationships are better, mood is unchanged sleep patterns are unchanged, and that the overall functioning is unchanged. Patient denies misusing/abusing her narcotic pain medications or using any illegal drugs. There are No indicators for possible drug abuse, addiction or diversion problems, patient states she has been doing fair. 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 She mentions she is using Percocet along with the other adjuvants. Patient denies any constipation symptoms. She reports she has been managing her ADL's. ALLERGIES: Patients list of allergies were reviewed     MEDICATIONS: Ms. Sonny Ford list of medications were reviewed. Her current medications are   Outpatient Medications Prior to Visit   Medication Sig Dispense Refill    methocarbamol (ROBAXIN) 500 MG tablet Take 1 tablet by mouth 2 times daily 60 tablet 1    celecoxib (CELEBREX) 200 MG capsule Take 1 capsule by mouth daily 30 capsule 1    oxyCODONE-acetaminophen (PERCOCET) 5-325 MG per tablet Take 0.5 tablets by mouth every 6 hours as needed for Pain (max 1-2 per day) for up to 56 days.  56 tablet 0    fluticasone-salmeterol (ADVAIR) 250-50 MCG/ACT AEPB diskus inhaler Inhale 1 puff into the lungs every 12 hours 60 each 3    sertraline (ZOLOFT) 25 MG tablet Take 1 tablet by mouth daily 30 tablet 5    Fluticasone-Salmeterol (AIRDUO RESPICLICK 751/64) 584-95 MCG/ACT AEPB Inhale 1 puff into the lungs 2 times daily 1 each 0    ondansetron (ZOFRAN-ODT) 4 MG disintegrating tablet Take 1 tablet by mouth every 8 hours as needed for Nausea 90 tablet 2    Benzoyl Peroxide 2.5 % CREA Apply 1 applicator topically every evening 21 g 0    Probiotic Product (PROBIOTIC DAILY PO) Take by mouth daily      calcium carbonate (TUMS) 500 MG chewable tablet Take 1 tablet by mouth daily. Multiple Vitamin (MULTI-VITAMIN DAILY PO) Take  by mouth.      gabapentin (NEURONTIN) 400 MG capsule Take 1 capsule po in AM, take 2 capsule po Afternoon, take 2 capsules po in  capsule 1     No facility-administered medications prior to visit. REVIEW OF SYSTEMS:    Respiratory: Negative for apnea, chest tightness and shortness of breath or change in baseline breathing. PHYSICAL EXAM:   Nursing note and vitals reviewed. /71   Pulse 68   Wt 134 lb (60.8 kg)   BMI 23.55 kg/m²   Constitutional: She appears well-developed and well-nourished. No acute distress. Cardiovascular: Normal rate, regular rhythm, normal heart sounds, and does not have murmur. Pulmonary/Chest: Effort normal. No respiratory distress. She does not have wheezes in the lung fields. She has no rales. Neurological/Psychiatric:She is alert and oriented to person, place, and time. Coordination is  normal.  Her mood isAppropriate and affect is Neutral/Euthymic(normal) . Her    IMPRESSION:   1. Chronic pain syndrome    2. Endometriosis    3. Fibromyalgia    4. Pelvic pain syndrome    5.  Chronic pelvic pain in female        PLAN:  Informed verbal consent was obtained  -ROM/Stretching exercises as advised   -Continue with Percocet 1-2 per day  -She was advised to increase fluids ( 5-7  glasses of fluid daily), limit caffeine, avoid cheese products, increase dietary fiber, increase activity and exercise as tolerated and relax regularly and enjoy meals   -Walking as tolerated     Current Outpatient Medications   Medication Sig Dispense Refill    methocarbamol (ROBAXIN) 500 MG tablet Take 1 tablet by mouth 2 times daily 60 tablet 1    celecoxib (CELEBREX) 200 MG capsule Take 1 capsule by mouth daily 30 capsule 1    oxyCODONE-acetaminophen (PERCOCET) 5-325 MG per tablet Take 0.5 tablets by mouth every 6 hours as needed for Pain (max 1-2 per day) for up to 56 days. 56 tablet 0    fluticasone-salmeterol (ADVAIR) 250-50 MCG/ACT AEPB diskus inhaler Inhale 1 puff into the lungs every 12 hours 60 each 3    sertraline (ZOLOFT) 25 MG tablet Take 1 tablet by mouth daily 30 tablet 5    Fluticasone-Salmeterol (AIRDUO RESPICLICK 848/92) 138-58 MCG/ACT AEPB Inhale 1 puff into the lungs 2 times daily 1 each 0    ondansetron (ZOFRAN-ODT) 4 MG disintegrating tablet Take 1 tablet by mouth every 8 hours as needed for Nausea 90 tablet 2    Benzoyl Peroxide 2.5 % CREA Apply 1 applicator topically every evening 21 g 0    Probiotic Product (PROBIOTIC DAILY PO) Take by mouth daily      calcium carbonate (TUMS) 500 MG chewable tablet Take 1 tablet by mouth daily. Multiple Vitamin (MULTI-VITAMIN DAILY PO) Take  by mouth.      gabapentin (NEURONTIN) 400 MG capsule Take 1 capsule po in AM, take 2 capsule po Afternoon, take 2 capsules po in  capsule 1     No current facility-administered medications for this visit. I will continue her current medication regimen  which is part of the above treatment schedule. It has been helping with Ms. Abi Salinas chronic  medical problems which for this visit include:   Diagnoses of Chronic pain syndrome, Endometriosis, Fibromyalgia, Pelvic pain syndrome, and Chronic pelvic pain in female were pertinent to this visit.    Risks and benefits of the medications and other alternative treatments  including no treatment were discussed with the patient. The common side effects of these medications were also explained to the patient. Informed verbal consent was obtained. Goals of current treatment regimen include improvement in pain, restoration of functioning- with focus on improvement in physical performance, general activity, work or disability,emotional distress, health care utilization and  decreased medication consumption. Will continue to monitor progress towards achieving/maintaining therapeutic goals with special emphasis on  1. Improvement in perceived interfernce  of pain with ADL's. Ability to do home exercises independently. Ability to do household chores indoor and/or outdoor work and social and leisure activities. Improve psychosocial and physical functioning. - she is showing progression towards this treatment goal with the current regimen. She was advised against drinking alcohol with the narcotic pain medicines, advised against driving or handling machinery while adjusting the dose of medicines or if having cognitive  issues related to the current medications. Risk of overdose and death, if medicines not taken as prescribed, were also discussed. If the patient develops new symptoms or if the symptoms worsen, the patient should call the office. While transcribing every attempt was made to maintain the accuracy of the note in terms of it's contents,there may have been some errors made inadvertently. Thank you for allowing me to participate in the care of this patient.     Igor Schmitt MD.    Cc: Tenisha Monique MD

## 2022-12-16 NOTE — TELEPHONE ENCOUNTER
The patient called to see what is happening with the PA for the OXYCODONE. Resubmitted PA for OXYCODONE  Via CMM Key: [id number] STATUS: PENDING      I RECENT SINCE THIS IS A MARKETPLACE; NOT MEDICAID. Even though Shanae Foy did APPPROVE.

## 2023-01-13 DIAGNOSIS — J45.991 COUGH VARIANT ASTHMA: ICD-10-CM

## 2023-01-13 RX ORDER — FLUTICASONE PROPIONATE AND SALMETEROL 232; 14 UG/1; UG/1
1 POWDER, METERED RESPIRATORY (INHALATION) 2 TIMES DAILY
Qty: 1 EACH | Refills: 0 | OUTPATIENT
Start: 2023-01-13

## 2023-01-13 RX ORDER — FLUTICASONE PROPIONATE AND SALMETEROL 250; 50 UG/1; UG/1
1 POWDER RESPIRATORY (INHALATION) EVERY 12 HOURS
Qty: 60 EACH | Refills: 3 | Status: SHIPPED | OUTPATIENT
Start: 2023-01-13

## 2023-01-13 NOTE — TELEPHONE ENCOUNTER
Fluticasone-Salmeterol (AIRO RESPICLICK 624/99) 179-99 MCG/ACT AEPB  Refill    Last seen 7/12/22 CHIDI  Future no

## 2023-01-13 NOTE — TELEPHONE ENCOUNTER
Last Office Visit  -  7/12/22  Next Office Visit  -  n/a    Last Filled  -  7/12/22  Last UDS -    Contract -

## 2023-02-09 ENCOUNTER — OFFICE VISIT (OUTPATIENT)
Dept: PAIN MANAGEMENT | Age: 38
End: 2023-02-09
Payer: COMMERCIAL

## 2023-02-09 VITALS
HEART RATE: 66 BPM | BODY MASS INDEX: 24.29 KG/M2 | WEIGHT: 138.2 LBS | OXYGEN SATURATION: 100 % | DIASTOLIC BLOOD PRESSURE: 66 MMHG | SYSTOLIC BLOOD PRESSURE: 108 MMHG

## 2023-02-09 DIAGNOSIS — N94.89 PELVIC PAIN SYNDROME: ICD-10-CM

## 2023-02-09 DIAGNOSIS — G89.4 CHRONIC PAIN SYNDROME: ICD-10-CM

## 2023-02-09 DIAGNOSIS — M79.7 FIBROMYALGIA: ICD-10-CM

## 2023-02-09 DIAGNOSIS — N80.9 ENDOMETRIOSIS: ICD-10-CM

## 2023-02-09 DIAGNOSIS — Z51.81 ENCOUNTER FOR THERAPEUTIC DRUG MONITORING: ICD-10-CM

## 2023-02-09 DIAGNOSIS — G89.29 CHRONIC PELVIC PAIN IN FEMALE: ICD-10-CM

## 2023-02-09 DIAGNOSIS — R10.2 CHRONIC PELVIC PAIN IN FEMALE: ICD-10-CM

## 2023-02-09 PROCEDURE — 99213 OFFICE O/P EST LOW 20 MIN: CPT | Performed by: INTERNAL MEDICINE

## 2023-02-09 RX ORDER — OXYCODONE HYDROCHLORIDE AND ACETAMINOPHEN 5; 325 MG/1; MG/1
0.5 TABLET ORAL EVERY 6 HOURS PRN
Qty: 56 TABLET | Refills: 0 | Status: SHIPPED | OUTPATIENT
Start: 2023-02-09 | End: 2023-04-06

## 2023-02-09 RX ORDER — METHOCARBAMOL 500 MG/1
500 TABLET, FILM COATED ORAL 2 TIMES DAILY PRN
Qty: 45 TABLET | Refills: 1 | Status: SHIPPED | OUTPATIENT
Start: 2023-02-09

## 2023-02-09 RX ORDER — CELECOXIB 200 MG/1
200 CAPSULE ORAL DAILY PRN
Qty: 30 CAPSULE | Refills: 1 | Status: SHIPPED | OUTPATIENT
Start: 2023-02-09

## 2023-02-09 RX ORDER — GABAPENTIN 400 MG/1
CAPSULE ORAL
Qty: 150 CAPSULE | Refills: 1 | Status: SHIPPED | OUTPATIENT
Start: 2023-02-09 | End: 2023-04-03

## 2023-02-13 NOTE — PROGRESS NOTES
[address]  1985  [id number]      HISTORY OF PRESENT ILLNESS:  Ms. Claudia Crigler is a 40 y.o. female returns for a follow up visit for pain management  She has a diagnosis of   1. Chronic pain syndrome    2. Pelvic pain syndrome    3. Endometriosis    4. Chronic pelvic pain in female    5. Fibromyalgia    6. Encounter for therapeutic drug monitoring    . She complains of pain in the  Low back,bilateral shoulders, bilateral arms, bilateral hands, bilateral upper legs, bilateral feet   She rates the pain 8/10 and describes it as sharp, aching. Current treatment regimen has helped relieve about 30% of the pain. She denies any side effects from the current pain regimen. Patient reports that since the last follow up visit the physical functioning is worse, family/social relationships are worse, mood is unchanged sleep patterns are unchanged, and that the overall functioning is worse. Patient denies misusing/abusing her narcotic pain medications or using any illegal drugs. There are No indicators for possible drug abuse, addiction or diversion problems. Patient complains she is has been having breathing issues. She mentions she had Covid infection a few months ago. She says she is on inhalers. She states she is using Percocet 1 per day along with other adjuvants. She reports she has a history of chronic bronchitis. She reports she is working full time. ALLERGIES: Patients list of allergies were reviewed     MEDICATIONS: Ms. Claudia Crigler list of medications were reviewed. Her current medications are   Outpatient Medications Prior to Visit   Medication Sig Dispense Refill    fluticasone-salmeterol (ADVAIR) 250-50 MCG/ACT AEPB diskus inhaler Inhale 1 puff into the lungs in the morning and 1 puff in the evening.  60 each 3    sertraline (ZOLOFT) 25 MG tablet Take 1 tablet by mouth daily 30 tablet 5    ondansetron (ZOFRAN-ODT) 4 MG disintegrating tablet Take 1 tablet by mouth every 8 hours as needed for Nausea 90 tablet 2    Benzoyl Peroxide 2.5 % CREA Apply 1 applicator topically every evening 21 g 0    Probiotic Product (PROBIOTIC DAILY PO) Take by mouth daily      calcium carbonate (TUMS) 500 MG chewable tablet Take 1 tablet by mouth daily. Multiple Vitamin (MULTI-VITAMIN DAILY PO) Take  by mouth. methocarbamol (ROBAXIN) 500 MG tablet Take 1 tablet by mouth 2 times daily as needed (for muscles spasms/stiffness) 45 tablet 0    celecoxib (CELEBREX) 200 MG capsule Take 1 capsule by mouth daily as needed for Pain 30 capsule 0    oxyCODONE-acetaminophen (PERCOCET) 5-325 MG per tablet Take 0.5 tablets by mouth every 6 hours as needed for Pain (max 1-2 per day) for up to 56 days. 56 tablet 0    gabapentin (NEURONTIN) 400 MG capsule Take 1 capsule po in AM, take 2 capsule po Afternoon, take 2 capsules po in  capsule 1     No facility-administered medications prior to visit. REVIEW OF SYSTEMS:    Respiratory: Negative for apnea, chest tightness and shortness of breath or change in baseline breathing. PHYSICAL EXAM:   Nursing note and vitals reviewed. /66   Pulse 66   Wt 138 lb 3.2 oz (62.7 kg)   SpO2 100%   BMI 24.29 kg/m²   Constitutional: She appears well-developed and well-nourished. No acute distress. Cardiovascular: Normal rate, regular rhythm, normal heart sounds, and does not have murmur. Pulmonary/Chest: Effort normal. No respiratory distress. She does not have wheezes in the lung fields. She has no rales. Neurological/Psychiatric:She is alert and oriented to person, place, and time. Coordination is  normal.  Her mood isAppropriate and affect is Neutral/Euthymic(normal) . IMPRESSION:   1. Chronic pain syndrome    2. Pelvic pain syndrome    3. Endometriosis    4. Chronic pelvic pain in female    5. Fibromyalgia    6.  Encounter for therapeutic drug monitoring        PLAN:  Informed verbal consent was obtained  -ROM/Stretching exercises as advised   -She was advised to increase fluids ( 5-7  glasses of fluid daily), limit caffeine, avoid cheese products, increase dietary fiber, increase activity and exercise as tolerated and relax regularly and enjoy meals   -Continue with Percocet 1/2 BID as needed   -Pulmonary rehab exercises   -Continue with all other adjuvants as before   Current Outpatient Medications   Medication Sig Dispense Refill    methocarbamol (ROBAXIN) 500 MG tablet Take 1 tablet by mouth 2 times daily as needed (for muscles spasms/stiffness) 45 tablet 1    celecoxib (CELEBREX) 200 MG capsule Take 1 capsule by mouth daily as needed for Pain 30 capsule 1    oxyCODONE-acetaminophen (PERCOCET) 5-325 MG per tablet Take 0.5 tablets by mouth every 6 hours as needed for Pain (max 1-2 per day) for up to 56 days. 56 tablet 0    gabapentin (NEURONTIN) 400 MG capsule Take 1 capsule po in AM, take 2 capsule po Afternoon, take 2 capsules po in  capsule 1    fluticasone-salmeterol (ADVAIR) 250-50 MCG/ACT AEPB diskus inhaler Inhale 1 puff into the lungs in the morning and 1 puff in the evening. 60 each 3    sertraline (ZOLOFT) 25 MG tablet Take 1 tablet by mouth daily 30 tablet 5    ondansetron (ZOFRAN-ODT) 4 MG disintegrating tablet Take 1 tablet by mouth every 8 hours as needed for Nausea 90 tablet 2    Benzoyl Peroxide 2.5 % CREA Apply 1 applicator topically every evening 21 g 0    Probiotic Product (PROBIOTIC DAILY PO) Take by mouth daily      calcium carbonate (TUMS) 500 MG chewable tablet Take 1 tablet by mouth daily. Multiple Vitamin (MULTI-VITAMIN DAILY PO) Take  by mouth. No current facility-administered medications for this visit. I will continue her current medication regimen  which is part of the above treatment schedule. It has been helping with Ms. Raisa Jolley chronic  medical problems which for this visit include:   Diagnoses of Chronic pain syndrome, Pelvic pain syndrome, Endometriosis, Chronic pelvic pain in female, Fibromyalgia, and Encounter for therapeutic drug monitoring were pertinent to this visit. Risks and benefits of the medications and other alternative treatments  including no treatment were discussed with the patient. The common side effects of these medications were also explained to the patient. Informed verbal consent was obtained. Goals of current treatment regimen include improvement in pain, restoration of functioning- with focus on improvement in physical performance, general activity, work or disability,emotional distress, health care utilization and  decreased medication consumption. Will continue to monitor progress towards achieving/maintaining therapeutic goals with special emphasis on  1. Improvement in perceived interfernce  of pain with ADL's. Ability to do home exercises independently. Ability to do household chores indoor and/or outdoor work and social and leisure activities. Improve psychosocial and physical functioning. - she is showing progression towards this treatment goal with the current regimen. She was advised against drinking alcohol with the narcotic pain medicines, advised against driving or handling machinery while adjusting the dose of medicines or if having cognitive  issues related to the current medications. Risk of overdose and death, if medicines not taken as prescribed, were also discussed. If the patient develops new symptoms or if the symptoms worsen, the patient should call the office. While transcribing every attempt was made to maintain the accuracy of the note in terms of it's contents,there may have been some errors made inadvertently. Thank you for allowing me to participate in the care of this patient.     Sandra Lefort, MD.    Cc: Alexandr Serrano MD

## 2023-02-15 DIAGNOSIS — F32.A DEPRESSION, UNSPECIFIED DEPRESSION TYPE: ICD-10-CM

## 2023-02-15 RX ORDER — SERTRALINE HYDROCHLORIDE 25 MG/1
25 TABLET, FILM COATED ORAL DAILY
Qty: 30 TABLET | Refills: 5 | Status: SHIPPED | OUTPATIENT
Start: 2023-02-15

## 2023-02-15 NOTE — TELEPHONE ENCOUNTER
Last Office Visit  -  7/12/22  Next Office Visit  -  n/a    Last Filled  -    Last UDS -    Contract -

## 2023-04-13 ENCOUNTER — OFFICE VISIT (OUTPATIENT)
Dept: PAIN MANAGEMENT | Age: 38
End: 2023-04-13
Payer: COMMERCIAL

## 2023-04-13 VITALS
DIASTOLIC BLOOD PRESSURE: 79 MMHG | OXYGEN SATURATION: 97 % | SYSTOLIC BLOOD PRESSURE: 114 MMHG | WEIGHT: 136.4 LBS | HEART RATE: 57 BPM | BODY MASS INDEX: 23.97 KG/M2

## 2023-04-13 DIAGNOSIS — N80.9 ENDOMETRIOSIS: ICD-10-CM

## 2023-04-13 DIAGNOSIS — R10.2 CHRONIC PELVIC PAIN IN FEMALE: ICD-10-CM

## 2023-04-13 DIAGNOSIS — N94.89 PELVIC PAIN SYNDROME: ICD-10-CM

## 2023-04-13 DIAGNOSIS — M79.7 FIBROMYALGIA: ICD-10-CM

## 2023-04-13 DIAGNOSIS — G89.29 CHRONIC PELVIC PAIN IN FEMALE: ICD-10-CM

## 2023-04-13 DIAGNOSIS — G89.4 CHRONIC PAIN SYNDROME: ICD-10-CM

## 2023-04-13 PROCEDURE — 99213 OFFICE O/P EST LOW 20 MIN: CPT | Performed by: INTERNAL MEDICINE

## 2023-04-13 RX ORDER — METHOCARBAMOL 500 MG/1
500 TABLET, FILM COATED ORAL 2 TIMES DAILY PRN
Qty: 45 TABLET | Refills: 1 | Status: SHIPPED | OUTPATIENT
Start: 2023-04-13

## 2023-04-13 RX ORDER — CELECOXIB 200 MG/1
200 CAPSULE ORAL DAILY PRN
Qty: 30 CAPSULE | Refills: 1 | Status: SHIPPED | OUTPATIENT
Start: 2023-04-13

## 2023-04-13 RX ORDER — GABAPENTIN 400 MG/1
CAPSULE ORAL
Qty: 150 CAPSULE | Refills: 1 | Status: SHIPPED | OUTPATIENT
Start: 2023-04-13 | End: 2023-06-05

## 2023-04-13 RX ORDER — OXYCODONE HYDROCHLORIDE AND ACETAMINOPHEN 5; 325 MG/1; MG/1
0.5 TABLET ORAL EVERY 6 HOURS PRN
Qty: 56 TABLET | Refills: 0 | Status: SHIPPED | OUTPATIENT
Start: 2023-04-13 | End: 2023-06-08

## 2023-07-20 ENCOUNTER — OFFICE VISIT (OUTPATIENT)
Dept: FAMILY MEDICINE CLINIC | Age: 38
End: 2023-07-20
Payer: COMMERCIAL

## 2023-07-20 VITALS
OXYGEN SATURATION: 98 % | WEIGHT: 136.6 LBS | BODY MASS INDEX: 23.32 KG/M2 | HEIGHT: 64 IN | HEART RATE: 63 BPM | DIASTOLIC BLOOD PRESSURE: 62 MMHG | SYSTOLIC BLOOD PRESSURE: 104 MMHG

## 2023-07-20 DIAGNOSIS — L70.0 ACNE VULGARIS: ICD-10-CM

## 2023-07-20 DIAGNOSIS — Z00.00 ENCOUNTER FOR WELL ADULT EXAM WITHOUT ABNORMAL FINDINGS: ICD-10-CM

## 2023-07-20 DIAGNOSIS — R11.0 NAUSEA: ICD-10-CM

## 2023-07-20 DIAGNOSIS — F32.A DEPRESSION, UNSPECIFIED DEPRESSION TYPE: ICD-10-CM

## 2023-07-20 DIAGNOSIS — Z00.00 ENCOUNTER FOR WELL ADULT EXAM WITHOUT ABNORMAL FINDINGS: Primary | ICD-10-CM

## 2023-07-20 DIAGNOSIS — Z11.1 SCREENING-PULMONARY TB: ICD-10-CM

## 2023-07-20 DIAGNOSIS — Z01.419 WELL WOMAN EXAM: ICD-10-CM

## 2023-07-20 LAB
ALBUMIN SERPL-MCNC: 4.6 G/DL (ref 3.4–5)
ALBUMIN/GLOB SERPL: 1.9 {RATIO} (ref 1.1–2.2)
ALP SERPL-CCNC: 51 U/L (ref 40–129)
ALT SERPL-CCNC: 9 U/L (ref 10–40)
ANION GAP SERPL CALCULATED.3IONS-SCNC: 11 MMOL/L (ref 3–16)
AST SERPL-CCNC: 14 U/L (ref 15–37)
BASOPHILS # BLD: 0 K/UL (ref 0–0.2)
BASOPHILS NFR BLD: 0.6 %
BILIRUB SERPL-MCNC: <0.2 MG/DL (ref 0–1)
BUN SERPL-MCNC: 3 MG/DL (ref 7–20)
CALCIUM SERPL-MCNC: 9.6 MG/DL (ref 8.3–10.6)
CHLORIDE SERPL-SCNC: 106 MMOL/L (ref 99–110)
CHOLEST SERPL-MCNC: 168 MG/DL (ref 0–199)
CO2 SERPL-SCNC: 25 MMOL/L (ref 21–32)
CREAT SERPL-MCNC: 0.7 MG/DL (ref 0.6–1.1)
DEPRECATED RDW RBC AUTO: 14.2 % (ref 12.4–15.4)
EOSINOPHIL # BLD: 0.2 K/UL (ref 0–0.6)
EOSINOPHIL NFR BLD: 3.9 %
GFR SERPLBLD CREATININE-BSD FMLA CKD-EPI: >60 ML/MIN/{1.73_M2}
GLUCOSE SERPL-MCNC: 90 MG/DL (ref 70–99)
HCT VFR BLD AUTO: 39 % (ref 36–48)
HDLC SERPL-MCNC: 67 MG/DL (ref 40–60)
HGB BLD-MCNC: 12.9 G/DL (ref 12–16)
LDLC SERPL CALC-MCNC: 82 MG/DL
LYMPHOCYTES # BLD: 1.6 K/UL (ref 1–5.1)
LYMPHOCYTES NFR BLD: 25.7 %
MCH RBC QN AUTO: 30.2 PG (ref 26–34)
MCHC RBC AUTO-ENTMCNC: 33 G/DL (ref 31–36)
MCV RBC AUTO: 91.6 FL (ref 80–100)
MONOCYTES # BLD: 0.5 K/UL (ref 0–1.3)
MONOCYTES NFR BLD: 7.2 %
NEUTROPHILS # BLD: 4 K/UL (ref 1.7–7.7)
NEUTROPHILS NFR BLD: 62.6 %
PLATELET # BLD AUTO: 336 K/UL (ref 135–450)
PMV BLD AUTO: 8.6 FL (ref 5–10.5)
POTASSIUM SERPL-SCNC: 4.8 MMOL/L (ref 3.5–5.1)
PROT SERPL-MCNC: 7 G/DL (ref 6.4–8.2)
RBC # BLD AUTO: 4.26 M/UL (ref 4–5.2)
SODIUM SERPL-SCNC: 142 MMOL/L (ref 136–145)
TRIGL SERPL-MCNC: 96 MG/DL (ref 0–150)
TSH SERPL DL<=0.005 MIU/L-ACNC: 2.35 UIU/ML (ref 0.27–4.2)
VLDLC SERPL CALC-MCNC: 19 MG/DL
WBC # BLD AUTO: 6.4 K/UL (ref 4–11)

## 2023-07-20 PROCEDURE — 99395 PREV VISIT EST AGE 18-39: CPT | Performed by: NURSE PRACTITIONER

## 2023-07-20 RX ORDER — SERTRALINE HYDROCHLORIDE 25 MG/1
25 TABLET, FILM COATED ORAL DAILY
Qty: 30 TABLET | Refills: 5 | Status: SHIPPED | OUTPATIENT
Start: 2023-07-20

## 2023-07-20 RX ORDER — ONDANSETRON 4 MG/1
4 TABLET, ORALLY DISINTEGRATING ORAL EVERY 8 HOURS PRN
Qty: 90 TABLET | Refills: 2 | Status: SHIPPED | OUTPATIENT
Start: 2023-07-20

## 2023-07-20 RX ORDER — FLUTICASONE PROPIONATE AND SALMETEROL 250; 50 UG/1; UG/1
POWDER RESPIRATORY (INHALATION)
Qty: 180 EACH | OUTPATIENT
Start: 2023-07-20

## 2023-07-20 RX ORDER — FLUTICASONE PROPIONATE AND SALMETEROL 250; 50 UG/1; UG/1
1 POWDER RESPIRATORY (INHALATION) EVERY 12 HOURS
Qty: 60 EACH | Refills: 3 | Status: SHIPPED | OUTPATIENT
Start: 2023-07-20

## 2023-07-20 ASSESSMENT — PATIENT HEALTH QUESTIONNAIRE - PHQ9
3. TROUBLE FALLING OR STAYING ASLEEP: MORE THAN HALF THE DAYS
8. MOVING OR SPEAKING SO SLOWLY THAT OTHER PEOPLE COULD HAVE NOTICED. OR THE OPPOSITE - BEING SO FIDGETY OR RESTLESS THAT YOU HAVE BEEN MOVING AROUND A LOT MORE THAN USUAL: NOT AT ALL
4. FEELING TIRED OR HAVING LITTLE ENERGY: NEARLY EVERY DAY
7. TROUBLE CONCENTRATING ON THINGS, SUCH AS READING THE NEWSPAPER OR WATCHING TELEVISION: NOT AT ALL
SUM OF ALL RESPONSES TO PHQ9 QUESTIONS 1 & 2: 1
SUM OF ALL RESPONSES TO PHQ QUESTIONS 1-9: 8
2. FEELING DOWN, DEPRESSED OR HOPELESS: 0
4. FEELING TIRED OR HAVING LITTLE ENERGY: 3
SUM OF ALL RESPONSES TO PHQ QUESTIONS 1-9: 8
SUM OF ALL RESPONSES TO PHQ QUESTIONS 1-9: 8
1. LITTLE INTEREST OR PLEASURE IN DOING THINGS: SEVERAL DAYS
2. FEELING DOWN, DEPRESSED OR HOPELESS: NOT AT ALL
5. POOR APPETITE OR OVEREATING: 1
10. IF YOU CHECKED OFF ANY PROBLEMS, HOW DIFFICULT HAVE THESE PROBLEMS MADE IT FOR YOU TO DO YOUR WORK, TAKE CARE OF THINGS AT HOME, OR GET ALONG WITH OTHER PEOPLE: 1
SUM OF ALL RESPONSES TO PHQ QUESTIONS 1-9: 8
5. POOR APPETITE OR OVEREATING: SEVERAL DAYS
6. FEELING BAD ABOUT YOURSELF - OR THAT YOU ARE A FAILURE OR HAVE LET YOURSELF OR YOUR FAMILY DOWN: 1
6. FEELING BAD ABOUT YOURSELF - OR THAT YOU ARE A FAILURE OR HAVE LET YOURSELF OR YOUR FAMILY DOWN: SEVERAL DAYS
7. TROUBLE CONCENTRATING ON THINGS, SUCH AS READING THE NEWSPAPER OR WATCHING TELEVISION: 0
8. MOVING OR SPEAKING SO SLOWLY THAT OTHER PEOPLE COULD HAVE NOTICED. OR THE OPPOSITE, BEING SO FIGETY OR RESTLESS THAT YOU HAVE BEEN MOVING AROUND A LOT MORE THAN USUAL: 0
10. IF YOU CHECKED OFF ANY PROBLEMS, HOW DIFFICULT HAVE THESE PROBLEMS MADE IT FOR YOU TO DO YOUR WORK, TAKE CARE OF THINGS AT HOME, OR GET ALONG WITH OTHER PEOPLE: SOMEWHAT DIFFICULT
SUM OF ALL RESPONSES TO PHQ QUESTIONS 1-9: 8
3. TROUBLE FALLING OR STAYING ASLEEP: 2
9. THOUGHTS THAT YOU WOULD BE BETTER OFF DEAD, OR OF HURTING YOURSELF: NOT AT ALL
9. THOUGHTS THAT YOU WOULD BE BETTER OFF DEAD, OR OF HURTING YOURSELF: 0
1. LITTLE INTEREST OR PLEASURE IN DOING THINGS: 1

## 2023-07-20 ASSESSMENT — ENCOUNTER SYMPTOMS
RESPIRATORY NEGATIVE: 1
GASTROINTESTINAL NEGATIVE: 1

## 2023-07-20 NOTE — PROGRESS NOTES
Well Adult Note  Name: Jemal Harris Date: 2023   MRN: [id number] Sex: Female   Age: 45 y.o. Ethnicity: Non- / Non    : 1985 Race: Other   Native [address]  Other  More than one Race      Lexie Joyce is here for well adult exam.  History:    Patient presents today for a wellness exam. 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 She is asking for new referrals for GYN and Dermatology. Review of Systems   Constitutional: Negative. HENT: Negative. Respiratory: Negative. Cardiovascular: Negative. Gastrointestinal: Negative. Musculoskeletal: Negative. Skin: Negative. Neurological: Negative. Hematological: Negative. Psychiatric/Behavioral: Negative. Allergies   Allergen Reactions    Cymbalta [Duloxetine Hcl]      Fatigue, wt gain         Prior to Visit Medications    Medication Sig Taking? Authorizing Provider   sertraline (ZOLOFT) 25 MG tablet Take 1 tablet by mouth daily Yes DAMON Rader CNP   ondansetron (ZOFRAN-ODT) 4 MG disintegrating tablet Take 1 tablet by mouth every 8 hours as needed for Nausea Yes DAMON Mohan CNP   fluticasone-salmeterol (ADVAIR) 250-50 MCG/ACT AEPB diskus inhaler Inhale 1 puff into the lungs in the morning and 1 puff in the evening. Yes DAMON Mohan CNP   methocarbamol (ROBAXIN) 500 MG tablet Take 1 tablet by mouth 2 times daily as needed (for muscles spasms/stiffness) Yes Sony Monk MD   celecoxib (CELEBREX) 200 MG capsule Take 1 capsule by mouth daily as needed for Pain Yes Sony Monk MD   gabapentin (NEURONTIN) 400 MG capsule Take 1 capsule po in AM, take 2 capsule po Afternoon, take 2 capsules po in PM Yes Sony Monk MD   oxyCODONE-acetaminophen (PERCOCET) 5-325 MG per tablet Take 0.5 tablets by mouth every 6 hours as needed for Pain (max 1-2 per day) for up to 56 days.  Yes Sony Monk MD   Benzoyl Peroxide 2.5 % CREA

## 2023-07-21 ENCOUNTER — TELEPHONE (OUTPATIENT)
Dept: FAMILY MEDICINE CLINIC | Age: 38
End: 2023-07-21

## 2023-07-21 DIAGNOSIS — R11.0 NAUSEA: ICD-10-CM

## 2023-07-21 NOTE — TELEPHONE ENCOUNTER
Submitted PA for Ondansetron 4MG dispersible tablets  Via [facility]  Key: [id number] STATUS: PENDING. Follow up done daily; if no response in three days we will refax for status check. If another three days goes by with no response we will call the insurance for status.

## 2023-07-23 LAB
GAMMA INTERFERON BACKGROUND BLD IA-ACNC: 0.02 IU/ML
MITOGEN IGNF BCKGRD COR BLD-ACNC: >10 IU/ML
QUANTI TB GOLD PLUS: NEGATIVE
QUANTI TB1 MINUS NIL: 0 IU/ML (ref 0–0.34)
QUANTI TB2 MINUS NIL: 0 IU/ML (ref 0–0.34)

## 2023-07-25 RX ORDER — ONDANSETRON 4 MG/1
4 TABLET, ORALLY DISINTEGRATING ORAL EVERY 8 HOURS PRN
Qty: 30 TABLET | Refills: 2 | Status: SHIPPED | OUTPATIENT
Start: 2023-07-25

## 2023-07-25 NOTE — TELEPHONE ENCOUNTER
Spoke to the pharmacy and they state that the insurance denied the med this time because it is to soon. They will see what happens when [facility] tries to refill it.

## 2023-07-25 NOTE — TELEPHONE ENCOUNTER
Received fax from [facility] stating rx is not covered. Requesting alternative Ondansetron tabmg.  Call to change medication with strength, directions, quantity and refills

## 2023-08-03 ENCOUNTER — OFFICE VISIT (OUTPATIENT)
Dept: PAIN MANAGEMENT | Age: 38
End: 2023-08-03

## 2023-08-03 VITALS
HEART RATE: 60 BPM | OXYGEN SATURATION: 98 % | DIASTOLIC BLOOD PRESSURE: 68 MMHG | SYSTOLIC BLOOD PRESSURE: 110 MMHG | BODY MASS INDEX: 23.52 KG/M2 | WEIGHT: 137 LBS

## 2023-08-03 DIAGNOSIS — R10.2 CHRONIC PELVIC PAIN IN FEMALE: ICD-10-CM

## 2023-08-03 DIAGNOSIS — G89.29 CHRONIC PELVIC PAIN IN FEMALE: ICD-10-CM

## 2023-08-03 DIAGNOSIS — G89.4 CHRONIC PAIN SYNDROME: ICD-10-CM

## 2023-08-03 DIAGNOSIS — N94.89 PELVIC PAIN SYNDROME: ICD-10-CM

## 2023-08-03 DIAGNOSIS — M79.7 FIBROMYALGIA: ICD-10-CM

## 2023-08-03 DIAGNOSIS — N80.9 ENDOMETRIOSIS: ICD-10-CM

## 2023-08-03 RX ORDER — OXYCODONE HYDROCHLORIDE AND ACETAMINOPHEN 5; 325 MG/1; MG/1
0.5 TABLET ORAL EVERY 6 HOURS PRN
Qty: 56 TABLET | Refills: 0 | Status: SHIPPED | OUTPATIENT
Start: 2023-08-03 | End: 2023-09-28

## 2023-08-03 RX ORDER — METHOCARBAMOL 500 MG/1
500 TABLET, FILM COATED ORAL 2 TIMES DAILY PRN
Qty: 45 TABLET | Refills: 1 | Status: SHIPPED | OUTPATIENT
Start: 2023-08-03

## 2023-08-03 RX ORDER — CELECOXIB 200 MG/1
200 CAPSULE ORAL DAILY PRN
Qty: 30 CAPSULE | Refills: 1 | Status: SHIPPED | OUTPATIENT
Start: 2023-08-03

## 2023-08-03 RX ORDER — GABAPENTIN 400 MG/1
CAPSULE ORAL
Qty: 150 CAPSULE | Refills: 1 | Status: SHIPPED | OUTPATIENT
Start: 2023-08-03 | End: 2023-09-25

## 2023-08-03 NOTE — PROGRESS NOTES
Chronic pain syndrome, Endometriosis, Chronic pelvic pain in female, Fibromyalgia, and Pelvic pain syndrome were pertinent to this visit. Risks and benefits of the medications and other alternative treatments  including no treatment were discussed with the patient. The common side effects of these medications were also explained to the patient. Informed verbal consent was obtained. Goals of current treatment regimen include improvement in pain, restoration of functioning- with focus on improvement in physical performance, general activity, work or disability,emotional distress, health care utilization and  decreased medication consumption. Will continue to monitor progress towards achieving/maintaining therapeutic goals with special emphasis on  1. Improvement in perceived interfernce  of pain with ADL's. Ability to do home exercises independently. Ability to do household chores indoor and/or outdoor work and social and leisure activities. Improve psychosocial and physical functioning. - she is showing progression towards this treatment goal with the current regimen. She was advised against drinking alcohol with the narcotic pain medicines, advised against driving or handling machinery while adjusting the dose of medicines or if having cognitive  issues related to the current medications. Risk of overdose and death, if medicines not taken as prescribed, were also discussed. If the patient develops new symptoms or if the symptoms worsen, the patient should call the office. While transcribing every attempt was made to maintain the accuracy of the note in terms of it's contents,there may have been some errors made inadvertently. Thank you for allowing me to participate in the care of this patient.     Bibi Hernandez MD.    Cc: Ike Abbott MD

## 2023-09-25 ENCOUNTER — OFFICE VISIT (OUTPATIENT)
Dept: PAIN MANAGEMENT | Age: 38
End: 2023-09-25
Payer: COMMERCIAL

## 2023-09-25 VITALS
SYSTOLIC BLOOD PRESSURE: 109 MMHG | BODY MASS INDEX: 23.9 KG/M2 | WEIGHT: 140 LBS | HEIGHT: 64 IN | DIASTOLIC BLOOD PRESSURE: 70 MMHG | OXYGEN SATURATION: 98 % | HEART RATE: 55 BPM

## 2023-09-25 DIAGNOSIS — R10.2 CHRONIC PELVIC PAIN IN FEMALE: ICD-10-CM

## 2023-09-25 DIAGNOSIS — R10.2 PELVIC PAIN SYNDROME: ICD-10-CM

## 2023-09-25 DIAGNOSIS — G89.4 CHRONIC PAIN SYNDROME: ICD-10-CM

## 2023-09-25 DIAGNOSIS — M79.7 FIBROMYALGIA: ICD-10-CM

## 2023-09-25 DIAGNOSIS — N80.9 ENDOMETRIOSIS: ICD-10-CM

## 2023-09-25 DIAGNOSIS — G89.29 CHRONIC PELVIC PAIN IN FEMALE: ICD-10-CM

## 2023-09-25 PROCEDURE — 99213 OFFICE O/P EST LOW 20 MIN: CPT | Performed by: INTERNAL MEDICINE

## 2023-09-25 RX ORDER — GABAPENTIN 400 MG/1
CAPSULE ORAL
Qty: 150 CAPSULE | Refills: 1 | Status: SHIPPED | OUTPATIENT
Start: 2023-09-25 | End: 2023-11-17

## 2023-09-25 RX ORDER — METHOCARBAMOL 500 MG/1
500 TABLET, FILM COATED ORAL 2 TIMES DAILY PRN
Qty: 45 TABLET | Refills: 1 | Status: SHIPPED | OUTPATIENT
Start: 2023-09-25

## 2023-09-25 RX ORDER — OXYCODONE HYDROCHLORIDE AND ACETAMINOPHEN 5; 325 MG/1; MG/1
0.5 TABLET ORAL EVERY 6 HOURS PRN
Qty: 56 TABLET | Refills: 0 | Status: SHIPPED | OUTPATIENT
Start: 2023-09-25 | End: 2023-11-20

## 2023-09-25 RX ORDER — CELECOXIB 200 MG/1
200 CAPSULE ORAL DAILY PRN
Qty: 30 CAPSULE | Refills: 1 | Status: SHIPPED | OUTPATIENT
Start: 2023-09-25

## 2023-09-25 RX ORDER — NALOXONE HYDROCHLORIDE 4 MG/.1ML
1 SPRAY NASAL PRN
Qty: 1 EACH | Refills: 0 | Status: SHIPPED | OUTPATIENT
Start: 2023-09-25

## 2023-09-25 NOTE — PROGRESS NOTES
Tu [address]  1985  [id number]      HISTORY OF PRESENT ILLNESS:  Ms. Christina Seth is a 45 y.o. female returns for a follow up visit for pain management  She has a diagnosis of   1. Chronic pain syndrome    2. Fibromyalgia    3. Endometriosis    4. Pelvic pain syndrome    5. Chronic pelvic pain in female    6. At risk for respiratory depression due to opioid    . As per information/history obtained from the PADT(patient assessment and documentation tool) -  She complains of pain in the shoulders Bilateral, elbows Bilateral, lower back, and knees Bilateral with radiation to the arms Bilateral, hands Bilateral, abdomen, buttocks, lower leg Bilateral, and ankles Bilateral She rates the pain 8/10 and describes it as sharp, aching. Pain is made worse by: sitting, lifting. She denies any side effects from the current pain regimen. Patient reports that since starting the current regimen under my care the physical functioning is better, family/social relationships are unchanged, mood is unchanged sleep patterns are unchanged, and that the overall functioning is better. Patient denies misusing/abusing her narcotic pain medications or using any illegal drugs. There are No indicators for possible drug abuse, addiction or diversion problems. Patient states she has been doing fair, her pain has been somewhat manageable but gets some fibromyalgia flare up. She mentions she is using Celebrex along with Robaxin, her denies any side effects. She mentions she is using Percocet 1/2-1 per day. ALLERGIES: Patients list of allergies were reviewed     MEDICATIONS: Ms. Christina Seth list of medications were reviewed. Her current medications are   Outpatient Medications Prior to Visit   Medication Sig Dispense Refill    ondansetron (ZOFRAN-ODT) 4 MG disintegrating tablet Take 1 tablet by mouth every 8 hours as needed for Nausea 30 tablet 2    sertraline (ZOLOFT) 25 MG tablet Take 1 tablet by mouth daily 30 tablet 5

## 2023-11-20 ENCOUNTER — OFFICE VISIT (OUTPATIENT)
Dept: PAIN MANAGEMENT | Age: 38
End: 2023-11-20

## 2023-11-20 VITALS
SYSTOLIC BLOOD PRESSURE: 97 MMHG | OXYGEN SATURATION: 98 % | DIASTOLIC BLOOD PRESSURE: 68 MMHG | WEIGHT: 140 LBS | HEART RATE: 56 BPM | BODY MASS INDEX: 24.03 KG/M2

## 2023-11-20 DIAGNOSIS — N80.9 ENDOMETRIOSIS: ICD-10-CM

## 2023-11-20 DIAGNOSIS — R10.2 CHRONIC PELVIC PAIN IN FEMALE: ICD-10-CM

## 2023-11-20 DIAGNOSIS — R10.2 PELVIC PAIN SYNDROME: ICD-10-CM

## 2023-11-20 DIAGNOSIS — G89.29 CHRONIC PELVIC PAIN IN FEMALE: ICD-10-CM

## 2023-11-20 DIAGNOSIS — G89.4 CHRONIC PAIN SYNDROME: ICD-10-CM

## 2023-11-20 DIAGNOSIS — M79.7 FIBROMYALGIA: ICD-10-CM

## 2023-11-20 RX ORDER — OXYCODONE HYDROCHLORIDE AND ACETAMINOPHEN 5; 325 MG/1; MG/1
0.5 TABLET ORAL EVERY 6 HOURS PRN
Qty: 56 TABLET | Refills: 0 | Status: SHIPPED | OUTPATIENT
Start: 2023-11-20 | End: 2024-01-15

## 2023-11-20 RX ORDER — METHOCARBAMOL 500 MG/1
500 TABLET, FILM COATED ORAL 2 TIMES DAILY PRN
Qty: 45 TABLET | Refills: 1 | Status: SHIPPED | OUTPATIENT
Start: 2023-11-20

## 2023-11-20 RX ORDER — GABAPENTIN 400 MG/1
CAPSULE ORAL
Qty: 150 CAPSULE | Refills: 1 | Status: SHIPPED | OUTPATIENT
Start: 2023-11-20 | End: 2024-01-12

## 2023-11-20 NOTE — PROGRESS NOTES
maintain the accuracy of the note in terms of it's contents,there may have been some errors made inadvertently. Thank you for allowing me to participate in the care of this patient. Vivi Georges MD.    Cc: Baron Jolley MD    I, Сергей Reaves, scribing for in the presence  of Dr. Vivi Georges. 11/20/23  Harris Anderson Assistant   I, Dr. Vivi Georges, personally performed the services described in this documentation as scribed by  Сергей Reaves MA in my presence and it is both accurate and complete

## 2024-01-15 ENCOUNTER — OFFICE VISIT (OUTPATIENT)
Dept: PAIN MANAGEMENT | Age: 39
End: 2024-01-15

## 2024-01-15 VITALS
BODY MASS INDEX: 24.55 KG/M2 | HEART RATE: 66 BPM | OXYGEN SATURATION: 100 % | WEIGHT: 143 LBS | SYSTOLIC BLOOD PRESSURE: 115 MMHG | DIASTOLIC BLOOD PRESSURE: 73 MMHG

## 2024-01-15 DIAGNOSIS — R10.2 PELVIC PAIN SYNDROME: ICD-10-CM

## 2024-01-15 DIAGNOSIS — M79.7 FIBROMYALGIA: ICD-10-CM

## 2024-01-15 DIAGNOSIS — N80.9 ENDOMETRIOSIS: ICD-10-CM

## 2024-01-15 DIAGNOSIS — G89.29 CHRONIC PELVIC PAIN IN FEMALE: ICD-10-CM

## 2024-01-15 DIAGNOSIS — R10.2 CHRONIC PELVIC PAIN IN FEMALE: ICD-10-CM

## 2024-01-15 DIAGNOSIS — Z51.81 ENCOUNTER FOR THERAPEUTIC DRUG MONITORING: ICD-10-CM

## 2024-01-15 DIAGNOSIS — G89.4 CHRONIC PAIN SYNDROME: ICD-10-CM

## 2024-01-15 RX ORDER — CELECOXIB 200 MG/1
200 CAPSULE ORAL DAILY PRN
Qty: 30 CAPSULE | Refills: 1 | Status: SHIPPED | OUTPATIENT
Start: 2024-01-15

## 2024-01-15 RX ORDER — OXYCODONE HYDROCHLORIDE AND ACETAMINOPHEN 5; 325 MG/1; MG/1
0.5 TABLET ORAL EVERY 6 HOURS PRN
Qty: 56 TABLET | Refills: 0 | Status: SHIPPED | OUTPATIENT
Start: 2024-01-15 | End: 2024-03-11

## 2024-01-15 RX ORDER — GABAPENTIN 400 MG/1
CAPSULE ORAL
Qty: 150 CAPSULE | Refills: 1 | Status: SHIPPED | OUTPATIENT
Start: 2024-01-15 | End: 2024-03-11

## 2024-01-15 RX ORDER — METHOCARBAMOL 500 MG/1
500 TABLET, FILM COATED ORAL 2 TIMES DAILY PRN
Qty: 45 TABLET | Refills: 1 | Status: SHIPPED | OUTPATIENT
Start: 2024-01-15

## 2024-01-15 NOTE — PROGRESS NOTES
every 8 hours as needed for Nausea 30 tablet 2    sertraline (ZOLOFT) 25 MG tablet Take 1 tablet by mouth daily 30 tablet 5    fluticasone-salmeterol (ADVAIR) 250-50 MCG/ACT AEPB diskus inhaler Inhale 1 puff into the lungs in the morning and 1 puff in the evening. 60 each 3    Benzoyl Peroxide 2.5 % CREA Apply 1 applicator topically every evening 21 g 0    Probiotic Product (PROBIOTIC DAILY PO) Take by mouth daily      calcium carbonate (TUMS) 500 MG chewable tablet Take 1 tablet by mouth daily      Multiple Vitamin (MULTI-VITAMIN DAILY PO) Take  by mouth.       No current facility-administered medications for this visit.       General Goals of current treatment regimen include improvement in pain, restoration of functioning- with focus on improvement in physical performance, general activity, work or disability,emotional distress, health care utilization and  decreased medication consumption.   Will continue to monitor progress towards achieving/maintaining therapeutic goals with special emphasis on  1. Improvement in perceived interfernce  of pain with ADL's. Ability to do home exercises independently. Ability to do household chores indoor and/or outdoor work and social and leisure activities.Improve psychosocial and physical functioning. - she is maintaining her treatment goal with the current regimen.      She was advised against drinking alcohol with the narcotic pain medicines, advised against driving or handling machinery while adjusting the dose of medicines or if having cognitive  issues related to the current medications.Risk of overdose and death, if medicines not taken as prescribed, were also discussed. If the patient develops new symptoms or if the symptoms worsen, the patient should call the office.    While transcribing every attempt was made to maintain the accuracy of the note in terms of it's contents,there may have been some errors made inadvertently.  Thank you for allowing me to participate in

## 2024-03-11 ENCOUNTER — OFFICE VISIT (OUTPATIENT)
Dept: PAIN MANAGEMENT | Age: 39
End: 2024-03-11

## 2024-03-11 VITALS
BODY MASS INDEX: 24.03 KG/M2 | OXYGEN SATURATION: 100 % | DIASTOLIC BLOOD PRESSURE: 80 MMHG | WEIGHT: 140 LBS | HEART RATE: 64 BPM | SYSTOLIC BLOOD PRESSURE: 109 MMHG

## 2024-03-11 DIAGNOSIS — R10.2 PELVIC PAIN SYNDROME: ICD-10-CM

## 2024-03-11 DIAGNOSIS — R10.2 CHRONIC PELVIC PAIN IN FEMALE: ICD-10-CM

## 2024-03-11 DIAGNOSIS — N80.9 ENDOMETRIOSIS: ICD-10-CM

## 2024-03-11 DIAGNOSIS — Z51.81 ENCOUNTER FOR THERAPEUTIC DRUG MONITORING: ICD-10-CM

## 2024-03-11 DIAGNOSIS — G89.29 CHRONIC PELVIC PAIN IN FEMALE: ICD-10-CM

## 2024-03-11 DIAGNOSIS — G89.4 CHRONIC PAIN SYNDROME: ICD-10-CM

## 2024-03-11 DIAGNOSIS — M79.7 FIBROMYALGIA: ICD-10-CM

## 2024-03-11 RX ORDER — METHOCARBAMOL 500 MG/1
500 TABLET, FILM COATED ORAL 2 TIMES DAILY PRN
Qty: 45 TABLET | Refills: 1 | Status: SHIPPED | OUTPATIENT
Start: 2024-03-11

## 2024-03-11 RX ORDER — OXYCODONE HYDROCHLORIDE AND ACETAMINOPHEN 5; 325 MG/1; MG/1
0.5 TABLET ORAL EVERY 6 HOURS PRN
Qty: 56 TABLET | Refills: 0 | Status: SHIPPED | OUTPATIENT
Start: 2024-03-11 | End: 2024-05-06

## 2024-03-11 RX ORDER — GABAPENTIN 400 MG/1
CAPSULE ORAL
Qty: 120 CAPSULE | Refills: 1 | Status: SHIPPED | OUTPATIENT
Start: 2024-03-11 | End: 2024-05-06

## 2024-03-11 NOTE — PROGRESS NOTES
medicines or if having cognitive  issues related to the current medications.Risk of overdose and death, if medicines not taken as prescribed, were also discussed. If the patient develops new symptoms or if the symptoms worsen, the patient should call the office.    While transcribing every attempt was made to maintain the accuracy of the note in terms of it's contents,there may have been some errors made inadvertently.  Thank you for allowing me to participate in the care of this patient.    Osvaldo Ram MD.    Cc: , Linden HALE MD    I, Carole Marquez, scribing for in the presence  of Dr. Osvaldo Ram.   03/11/24  Carole Marquez, Medical Assistant   I, Dr. Osvaldo Ram, personally performed the services described in this documentation as scribed by  Carole Marquez MA in my presence and it is both accurate and complete

## 2024-03-13 ENCOUNTER — TELEPHONE (OUTPATIENT)
Dept: PAIN MANAGEMENT | Age: 39
End: 2024-03-13

## 2024-03-13 NOTE — TELEPHONE ENCOUNTER
This medication is APPROVED.  3/13/2024-6/11/2024.    [facility] has been notified.   Thank you!

## 2024-03-13 NOTE — TELEPHONE ENCOUNTER
Submitted PA for Oxycodone-Acetaminophen Via [facility] Key: [id number] STATUS: PENDING.    Follow up done daily; if no decision with in three days we will refax.  If another three days goes by with no decision will call the insurance for status.

## 2024-03-26 DIAGNOSIS — F32.A DEPRESSION, UNSPECIFIED DEPRESSION TYPE: ICD-10-CM

## 2024-03-26 RX ORDER — SERTRALINE HYDROCHLORIDE 25 MG/1
25 TABLET, FILM COATED ORAL DAILY
Qty: 30 TABLET | Refills: 0 | Status: SHIPPED | OUTPATIENT
Start: 2024-03-26

## 2024-03-26 NOTE — TELEPHONE ENCOUNTER
Last Office Visit  -  7/20/23  Next Office Visit  -      Last Filled  -    Last UDS -    Contract -

## 2024-04-29 DIAGNOSIS — F32.A DEPRESSION, UNSPECIFIED DEPRESSION TYPE: ICD-10-CM

## 2024-04-29 RX ORDER — SERTRALINE HYDROCHLORIDE 25 MG/1
25 TABLET, FILM COATED ORAL DAILY
Qty: 30 TABLET | Refills: 0 | Status: SHIPPED | OUTPATIENT
Start: 2024-04-29

## 2024-04-29 NOTE — TELEPHONE ENCOUNTER
Last Office Visit  -  7/20/23  Next Office Visit  -  n/a    Last Filled  -    Last UDS -    Contract -

## 2024-05-09 ENCOUNTER — OFFICE VISIT (OUTPATIENT)
Dept: PAIN MANAGEMENT | Age: 39
End: 2024-05-09
Payer: COMMERCIAL

## 2024-05-09 VITALS
DIASTOLIC BLOOD PRESSURE: 62 MMHG | SYSTOLIC BLOOD PRESSURE: 111 MMHG | HEART RATE: 64 BPM | WEIGHT: 129 LBS | OXYGEN SATURATION: 98 % | BODY MASS INDEX: 22.14 KG/M2

## 2024-05-09 DIAGNOSIS — M79.7 FIBROMYALGIA: ICD-10-CM

## 2024-05-09 DIAGNOSIS — G89.29 CHRONIC PELVIC PAIN IN FEMALE: ICD-10-CM

## 2024-05-09 DIAGNOSIS — R10.2 CHRONIC PELVIC PAIN IN FEMALE: ICD-10-CM

## 2024-05-09 DIAGNOSIS — N80.9 ENDOMETRIOSIS: ICD-10-CM

## 2024-05-09 DIAGNOSIS — G89.4 CHRONIC PAIN SYNDROME: ICD-10-CM

## 2024-05-09 DIAGNOSIS — R10.2 PELVIC PAIN SYNDROME: ICD-10-CM

## 2024-05-09 PROCEDURE — 99213 OFFICE O/P EST LOW 20 MIN: CPT | Performed by: INTERNAL MEDICINE

## 2024-05-09 RX ORDER — METHOCARBAMOL 500 MG/1
500 TABLET, FILM COATED ORAL 2 TIMES DAILY PRN
Qty: 45 TABLET | Refills: 0 | Status: SHIPPED | OUTPATIENT
Start: 2024-05-09

## 2024-05-09 RX ORDER — GABAPENTIN 400 MG/1
CAPSULE ORAL
Qty: 120 CAPSULE | Refills: 1 | Status: SHIPPED | OUTPATIENT
Start: 2024-05-09 | End: 2024-07-04

## 2024-05-09 RX ORDER — OXYCODONE HYDROCHLORIDE AND ACETAMINOPHEN 5; 325 MG/1; MG/1
0.5 TABLET ORAL EVERY 6 HOURS PRN
Qty: 56 TABLET | Refills: 0 | Status: SHIPPED | OUTPATIENT
Start: 2024-05-09 | End: 2024-07-04

## 2024-05-09 NOTE — PROGRESS NOTES
Probiotic Product (PROBIOTIC DAILY PO) Take by mouth daily      calcium carbonate (TUMS) 500 MG chewable tablet Take 1 tablet by mouth daily      Multiple Vitamin (MULTI-VITAMIN DAILY PO) Take  by mouth.      gabapentin (NEURONTIN) 400 MG capsule Take 1 capsule po in AM, take 1 capsule po Afternoon, take 2 capsules po in  capsule 1     No current facility-administered medications for this visit.       General Goals of current treatment regimen include improvement in pain, restoration of functioning- with focus on improvement in physical performance, general activity, work or disability,emotional distress, health care utilization and  decreased medication consumption.   Will continue to monitor progress towards achieving/maintaining therapeutic goals with special emphasis on  1. Improvement in perceived interfernce  of pain with ADL's. Ability to do home exercises independently. Ability to do household chores indoor and/or outdoor work and social and leisure activities.Improve psychosocial and physical functioning. - she is maintaining her treatment goal with the current regimen.     She was advised against drinking alcohol with the narcotic pain medicines, advised against driving or handling machinery while adjusting the dose of medicines or if having cognitive  issues related to the current medications.Risk of overdose and death, if medicines not taken as prescribed, were also discussed. If the patient develops new symptoms or if the symptoms worsen, the patient should call the office.    Thank you for allowing me to participate in the care of this patient.      Cc: , Linden HALE MD

## 2024-06-02 DIAGNOSIS — F32.A DEPRESSION, UNSPECIFIED DEPRESSION TYPE: ICD-10-CM

## 2024-06-03 RX ORDER — SERTRALINE HYDROCHLORIDE 25 MG/1
25 TABLET, FILM COATED ORAL DAILY
Qty: 30 TABLET | Refills: 5 | Status: SHIPPED | OUTPATIENT
Start: 2024-06-03

## 2024-07-15 ENCOUNTER — OFFICE VISIT (OUTPATIENT)
Dept: PAIN MANAGEMENT | Age: 39
End: 2024-07-15

## 2024-07-15 VITALS
WEIGHT: 134 LBS | SYSTOLIC BLOOD PRESSURE: 110 MMHG | DIASTOLIC BLOOD PRESSURE: 75 MMHG | HEART RATE: 61 BPM | OXYGEN SATURATION: 99 % | BODY MASS INDEX: 23 KG/M2

## 2024-07-15 DIAGNOSIS — N80.9 ENDOMETRIOSIS: ICD-10-CM

## 2024-07-15 DIAGNOSIS — G89.4 CHRONIC PAIN SYNDROME: ICD-10-CM

## 2024-07-15 DIAGNOSIS — M79.7 FIBROMYALGIA: ICD-10-CM

## 2024-07-15 DIAGNOSIS — G89.29 CHRONIC PELVIC PAIN IN FEMALE: ICD-10-CM

## 2024-07-15 DIAGNOSIS — R10.2 CHRONIC PELVIC PAIN IN FEMALE: ICD-10-CM

## 2024-07-15 DIAGNOSIS — R10.2 PELVIC PAIN SYNDROME: ICD-10-CM

## 2024-07-15 RX ORDER — CELECOXIB 200 MG/1
200 CAPSULE ORAL DAILY PRN
Qty: 30 CAPSULE | Refills: 1 | Status: SHIPPED | OUTPATIENT
Start: 2024-07-15

## 2024-07-15 RX ORDER — OXYCODONE HYDROCHLORIDE AND ACETAMINOPHEN 5; 325 MG/1; MG/1
0.5 TABLET ORAL EVERY 6 HOURS PRN
Qty: 56 TABLET | Refills: 0 | Status: SHIPPED | OUTPATIENT
Start: 2024-07-15 | End: 2024-09-09

## 2024-07-15 RX ORDER — METHOCARBAMOL 500 MG/1
500 TABLET, FILM COATED ORAL 2 TIMES DAILY PRN
Qty: 45 TABLET | Refills: 0 | Status: SHIPPED | OUTPATIENT
Start: 2024-07-15

## 2024-07-15 RX ORDER — GABAPENTIN 400 MG/1
CAPSULE ORAL
Qty: 120 CAPSULE | Refills: 1 | Status: SHIPPED | OUTPATIENT
Start: 2024-07-15 | End: 2024-09-20

## 2024-07-31 DIAGNOSIS — R11.0 NAUSEA: ICD-10-CM

## 2024-07-31 RX ORDER — ONDANSETRON 4 MG/1
4 TABLET, ORALLY DISINTEGRATING ORAL EVERY 8 HOURS PRN
Qty: 90 TABLET | Refills: 0 | Status: SHIPPED | OUTPATIENT
Start: 2024-07-31

## 2024-08-15 ENCOUNTER — OFFICE VISIT (OUTPATIENT)
Dept: FAMILY MEDICINE CLINIC | Age: 39
End: 2024-08-15
Payer: COMMERCIAL

## 2024-08-15 VITALS
HEART RATE: 70 BPM | OXYGEN SATURATION: 99 % | WEIGHT: 134.2 LBS | SYSTOLIC BLOOD PRESSURE: 118 MMHG | DIASTOLIC BLOOD PRESSURE: 68 MMHG | HEIGHT: 64 IN | BODY MASS INDEX: 22.91 KG/M2

## 2024-08-15 DIAGNOSIS — Z01.84 IMMUNITY STATUS TESTING: ICD-10-CM

## 2024-08-15 DIAGNOSIS — Z00.00 ENCOUNTER FOR WELL ADULT EXAM WITHOUT ABNORMAL FINDINGS: Primary | ICD-10-CM

## 2024-08-15 DIAGNOSIS — L70.0 CYSTIC ACNE: ICD-10-CM

## 2024-08-15 DIAGNOSIS — F41.9 ANXIETY: ICD-10-CM

## 2024-08-15 DIAGNOSIS — N80.9 ENDOMETRIOSIS: ICD-10-CM

## 2024-08-15 PROCEDURE — 99395 PREV VISIT EST AGE 18-39: CPT | Performed by: NURSE PRACTITIONER

## 2024-08-15 PROCEDURE — 99213 OFFICE O/P EST LOW 20 MIN: CPT | Performed by: NURSE PRACTITIONER

## 2024-08-15 RX ORDER — FLUTICASONE PROPIONATE AND SALMETEROL 250; 50 UG/1; UG/1
1 POWDER RESPIRATORY (INHALATION) EVERY 12 HOURS
Qty: 60 EACH | Refills: 3 | Status: SHIPPED | OUTPATIENT
Start: 2024-08-15

## 2024-08-15 SDOH — ECONOMIC STABILITY: FOOD INSECURITY: WITHIN THE PAST 12 MONTHS, THE FOOD YOU BOUGHT JUST DIDN'T LAST AND YOU DIDN'T HAVE MONEY TO GET MORE.: NEVER TRUE

## 2024-08-15 SDOH — ECONOMIC STABILITY: INCOME INSECURITY: HOW HARD IS IT FOR YOU TO PAY FOR THE VERY BASICS LIKE FOOD, HOUSING, MEDICAL CARE, AND HEATING?: VERY HARD

## 2024-08-15 SDOH — ECONOMIC STABILITY: FOOD INSECURITY: WITHIN THE PAST 12 MONTHS, YOU WORRIED THAT YOUR FOOD WOULD RUN OUT BEFORE YOU GOT MONEY TO BUY MORE.: NEVER TRUE

## 2024-08-15 ASSESSMENT — ANXIETY QUESTIONNAIRES
3. WORRYING TOO MUCH ABOUT DIFFERENT THINGS: MORE THAN HALF THE DAYS
IF YOU CHECKED OFF ANY PROBLEMS ON THIS QUESTIONNAIRE, HOW DIFFICULT HAVE THESE PROBLEMS MADE IT FOR YOU TO DO YOUR WORK, TAKE CARE OF THINGS AT HOME, OR GET ALONG WITH OTHER PEOPLE: SOMEWHAT DIFFICULT
5. BEING SO RESTLESS THAT IT IS HARD TO SIT STILL: SEVERAL DAYS
2. NOT BEING ABLE TO STOP OR CONTROL WORRYING: NEARLY EVERY DAY
6. BECOMING EASILY ANNOYED OR IRRITABLE: SEVERAL DAYS
4. TROUBLE RELAXING: NEARLY EVERY DAY
7. FEELING AFRAID AS IF SOMETHING AWFUL MIGHT HAPPEN: SEVERAL DAYS
1. FEELING NERVOUS, ANXIOUS, OR ON EDGE: NEARLY EVERY DAY
GAD7 TOTAL SCORE: 14

## 2024-08-15 ASSESSMENT — ENCOUNTER SYMPTOMS
RESPIRATORY NEGATIVE: 1
COUGH: 0
WHEEZING: 0
SHORTNESS OF BREATH: 0
GASTROINTESTINAL NEGATIVE: 1

## 2024-08-15 ASSESSMENT — PATIENT HEALTH QUESTIONNAIRE - PHQ9
4. FEELING TIRED OR HAVING LITTLE ENERGY: NEARLY EVERY DAY
SUM OF ALL RESPONSES TO PHQ QUESTIONS 1-9: 12
SUM OF ALL RESPONSES TO PHQ QUESTIONS 1-9: 12
8. MOVING OR SPEAKING SO SLOWLY THAT OTHER PEOPLE COULD HAVE NOTICED. OR THE OPPOSITE, BEING SO FIGETY OR RESTLESS THAT YOU HAVE BEEN MOVING AROUND A LOT MORE THAN USUAL: SEVERAL DAYS
6. FEELING BAD ABOUT YOURSELF - OR THAT YOU ARE A FAILURE OR HAVE LET YOURSELF OR YOUR FAMILY DOWN: SEVERAL DAYS
SUM OF ALL RESPONSES TO PHQ QUESTIONS 1-9: 12
1. LITTLE INTEREST OR PLEASURE IN DOING THINGS: SEVERAL DAYS
5. POOR APPETITE OR OVEREATING: MORE THAN HALF THE DAYS
9. THOUGHTS THAT YOU WOULD BE BETTER OFF DEAD, OR OF HURTING YOURSELF: NOT AT ALL
SUM OF ALL RESPONSES TO PHQ QUESTIONS 1-9: 12
SUM OF ALL RESPONSES TO PHQ9 QUESTIONS 1 & 2: 2
7. TROUBLE CONCENTRATING ON THINGS, SUCH AS READING THE NEWSPAPER OR WATCHING TELEVISION: SEVERAL DAYS
2. FEELING DOWN, DEPRESSED OR HOPELESS: SEVERAL DAYS
10. IF YOU CHECKED OFF ANY PROBLEMS, HOW DIFFICULT HAVE THESE PROBLEMS MADE IT FOR YOU TO DO YOUR WORK, TAKE CARE OF THINGS AT HOME, OR GET ALONG WITH OTHER PEOPLE: VERY DIFFICULT

## 2024-08-15 NOTE — PROGRESS NOTES
Well Adult Note  Name: Bailey Torres Today’s Date: 8/15/2024   MRN: [id number] Sex: Female   Age: 39 y.o. Ethnicity: Non- / Non    : 1985 Race: Other     Other  More than one Race      Bailey Torres is here for a well adult exam.       Assessment & Plan   Encounter for well adult exam without abnormal findings  Stable, see below  Anxiety  Increase zoloft to 50 mg daily and follow up in 1 month.   -     sertraline (ZOLOFT) 50 MG tablet; Take 1 tablet by mouth daily, Disp-90 tablet, R-1Normal  Immunity status testing  Check blood work for immunity status testing.   -     Mumps, Measles, Rubella, and Varicella Zoster, IgG; Future  -     Quantiferon TB Gold; Future  -     Hepatitis B Surface Antibody; Future  Cystic acne  -     AFL - Marlene Rodriges PA, Dermatology, [facility]  Endometriosis  -     Tere Chapa DO, Gynecology, [facility]               Subjective   History:  Patient presents today for a wellness exam. 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   She is having a lot of anxiety and she would like to increase her zoloft. She states she has been on the 25 mg daily but feels it is not as affective.   She would like a referral to dermatology for cystic acne and she would like to see GYN for endometriosis.       Review of Systems   Constitutional: Negative.    HENT: Negative.     Respiratory: Negative.  Negative for cough, shortness of breath and wheezing.    Cardiovascular: Negative.    Gastrointestinal: Negative.    Musculoskeletal: Negative.    Skin: Negative.    Neurological: Negative.    Psychiatric/Behavioral: Negative.         Allergies   Allergen Reactions    Cymbalta [Duloxetine Hcl]      Fatigue, wt gain     Prior to Visit Medications    Medication Sig Taking? Authorizing Provider   sertraline (ZOLOFT) 50 MG tablet Take 1 tablet by mouth daily Yes Cal, Shasha, APRN - CNP   fluticasone-salmeterol (ADVAIR) 250-50 MCG/ACT

## 2024-08-15 NOTE — PATIENT INSTRUCTIONS
Talk with your doctor about your choices and what might be best for you.   Prevent problems where you can. Protect your skin from too much sun, wash your hands, brush your teeth twice a day, and wear a seat belt in the car.   Where can you learn more?  Go to https://[email or web address].net/patientEd and enter P072 to learn more about \"Well Visit, Ages 18 to 65: Care Instructions.\"  Current as of: August 6, 2023  Content Version: 14.1  © 2006-2024 [employer].   Care instructions adapted under license by [employer]. If you have questions about a medical condition or this instruction, always ask your healthcare professional. [employer] disclaims any warranty or liability for your use of this information.

## 2024-08-16 LAB
HBV SURFACE AB SERPL IA-ACNC: 7.14 MIU/ML
MEV IGG SER QL IA: NORMAL
MUV IGG SER QL IA: NORMAL
RUBV IGG SERPL QL IA: NORMAL
VZV IGG SER QL IA: NORMAL

## 2024-08-17 LAB
GAMMA INTERFERON BACKGROUND BLD IA-ACNC: 0 IU/ML
M TB IFN-G BLD-IMP: NEGATIVE
M TB IFN-G CD4+ BCKGRND COR BLD-ACNC: 0 IU/ML
M TB IFN-G CD4+CD8+ BCKGRND COR BLD-ACNC: 0 IU/ML
MITOGEN IGNF BCKGRD COR BLD-ACNC: 10 IU/ML

## 2024-09-09 ENCOUNTER — OFFICE VISIT (OUTPATIENT)
Dept: PAIN MANAGEMENT | Age: 39
End: 2024-09-09

## 2024-09-09 VITALS
DIASTOLIC BLOOD PRESSURE: 73 MMHG | OXYGEN SATURATION: 98 % | WEIGHT: 135 LBS | SYSTOLIC BLOOD PRESSURE: 121 MMHG | BODY MASS INDEX: 23.17 KG/M2 | HEART RATE: 70 BPM

## 2024-09-09 DIAGNOSIS — R10.2 CHRONIC PELVIC PAIN IN FEMALE: ICD-10-CM

## 2024-09-09 DIAGNOSIS — N80.9 ENDOMETRIOSIS: ICD-10-CM

## 2024-09-09 DIAGNOSIS — G89.29 CHRONIC PELVIC PAIN IN FEMALE: ICD-10-CM

## 2024-09-09 DIAGNOSIS — G89.4 CHRONIC PAIN SYNDROME: ICD-10-CM

## 2024-09-09 DIAGNOSIS — M79.7 FIBROMYALGIA: ICD-10-CM

## 2024-09-09 DIAGNOSIS — R10.2 PELVIC PAIN SYNDROME: ICD-10-CM

## 2024-09-09 RX ORDER — GABAPENTIN 400 MG/1
CAPSULE ORAL
Qty: 120 CAPSULE | Refills: 1 | Status: SHIPPED | OUTPATIENT
Start: 2024-09-09 | End: 2024-11-15

## 2024-09-09 RX ORDER — METHOCARBAMOL 500 MG/1
500 TABLET, FILM COATED ORAL 2 TIMES DAILY PRN
Qty: 45 TABLET | Refills: 0 | Status: SHIPPED | OUTPATIENT
Start: 2024-09-09

## 2024-09-09 RX ORDER — CELECOXIB 200 MG/1
200 CAPSULE ORAL DAILY PRN
Qty: 30 CAPSULE | Refills: 1 | Status: SHIPPED | OUTPATIENT
Start: 2024-09-09

## 2024-09-09 RX ORDER — OXYCODONE AND ACETAMINOPHEN 5; 325 MG/1; MG/1
0.5 TABLET ORAL EVERY 6 HOURS PRN
Qty: 56 TABLET | Refills: 0 | Status: SHIPPED | OUTPATIENT
Start: 2024-09-09 | End: 2024-11-04

## 2024-11-04 ENCOUNTER — OFFICE VISIT (OUTPATIENT)
Dept: PAIN MANAGEMENT | Age: 39
End: 2024-11-04

## 2024-11-04 VITALS
OXYGEN SATURATION: 95 % | WEIGHT: 137.2 LBS | HEART RATE: 85 BPM | BODY MASS INDEX: 23.55 KG/M2 | DIASTOLIC BLOOD PRESSURE: 66 MMHG | SYSTOLIC BLOOD PRESSURE: 108 MMHG

## 2024-11-04 DIAGNOSIS — R10.2 CHRONIC PELVIC PAIN IN FEMALE: ICD-10-CM

## 2024-11-04 DIAGNOSIS — G89.4 CHRONIC PAIN SYNDROME: ICD-10-CM

## 2024-11-04 DIAGNOSIS — N80.9 ENDOMETRIOSIS: ICD-10-CM

## 2024-11-04 DIAGNOSIS — M79.7 FIBROMYALGIA: ICD-10-CM

## 2024-11-04 DIAGNOSIS — R10.2 PELVIC PAIN SYNDROME: ICD-10-CM

## 2024-11-04 DIAGNOSIS — Z51.81 ENCOUNTER FOR THERAPEUTIC DRUG MONITORING: ICD-10-CM

## 2024-11-04 DIAGNOSIS — G89.29 CHRONIC PELVIC PAIN IN FEMALE: ICD-10-CM

## 2024-11-04 DIAGNOSIS — Z91.89 AT RISK FOR RESPIRATORY DEPRESSION DUE TO OPIOID: ICD-10-CM

## 2024-11-04 RX ORDER — CELECOXIB 200 MG/1
200 CAPSULE ORAL DAILY PRN
Qty: 30 CAPSULE | Refills: 1 | Status: SHIPPED | OUTPATIENT
Start: 2024-11-04

## 2024-11-04 RX ORDER — METHOCARBAMOL 500 MG/1
500 TABLET, FILM COATED ORAL 2 TIMES DAILY PRN
Qty: 45 TABLET | Refills: 0 | Status: SHIPPED | OUTPATIENT
Start: 2024-11-04

## 2024-11-04 RX ORDER — OXYCODONE AND ACETAMINOPHEN 5; 325 MG/1; MG/1
0.5 TABLET ORAL EVERY 6 HOURS PRN
Qty: 56 TABLET | Refills: 0 | Status: SHIPPED | OUTPATIENT
Start: 2024-11-04 | End: 2024-12-30

## 2024-11-04 RX ORDER — GABAPENTIN 400 MG/1
CAPSULE ORAL
Qty: 120 CAPSULE | Refills: 1 | Status: SHIPPED | OUTPATIENT
Start: 2024-11-04 | End: 2025-01-10

## 2024-11-04 NOTE — PROGRESS NOTES
on  1. -Improvement in perceived interfernce  of pain with ADL's. YES  -Ability to do home exercises independently. YES  -Ability to do household chores, indoor work and social and leisure activities. YES  -Improve psychosocial and physical functioning.YES  -Ability to do outside chores/ yard work YES       2. -Ability to focus/concentrate at work and perform the duties required of her at work.YES  -Sit through a workday without increased pain YES  -Stand 30-60 minutes without lower extremity symptoms. Ability to go up and down stairs or walk independently{ YES  -Ability to lift up to 10-20 lbs. YES     She was advised against drinking alcohol with the narcotic pain medicines, advised against driving or handling machinery while adjusting the dose of medicines or if having cognitive  issues related to the current medications.Risk of overdose and death, if medicines not taken as prescribed, were also discussed. If the patient develops new symptoms or if the symptoms worsen, the patient should call the office.    Thank you for allowing me to participate in the care of this patient.      Cc: , Linden HALE MD

## 2024-12-30 ENCOUNTER — OFFICE VISIT (OUTPATIENT)
Dept: PAIN MANAGEMENT | Age: 39
End: 2024-12-30

## 2024-12-30 VITALS
HEART RATE: 65 BPM | SYSTOLIC BLOOD PRESSURE: 99 MMHG | WEIGHT: 131 LBS | OXYGEN SATURATION: 98 % | BODY MASS INDEX: 22.49 KG/M2 | DIASTOLIC BLOOD PRESSURE: 61 MMHG

## 2024-12-30 DIAGNOSIS — R10.2 PELVIC PAIN SYNDROME: ICD-10-CM

## 2024-12-30 DIAGNOSIS — R10.2 CHRONIC PELVIC PAIN IN FEMALE: ICD-10-CM

## 2024-12-30 DIAGNOSIS — N80.9 ENDOMETRIOSIS: ICD-10-CM

## 2024-12-30 DIAGNOSIS — G89.29 CHRONIC PELVIC PAIN IN FEMALE: ICD-10-CM

## 2024-12-30 DIAGNOSIS — M79.7 FIBROMYALGIA: ICD-10-CM

## 2024-12-30 DIAGNOSIS — Z91.89 AT RISK FOR RESPIRATORY DEPRESSION DUE TO OPIOID: ICD-10-CM

## 2024-12-30 DIAGNOSIS — G89.4 CHRONIC PAIN SYNDROME: ICD-10-CM

## 2024-12-30 PROCEDURE — 99213 OFFICE O/P EST LOW 20 MIN: CPT | Performed by: INTERNAL MEDICINE

## 2024-12-30 RX ORDER — OXYCODONE AND ACETAMINOPHEN 5; 325 MG/1; MG/1
0.5 TABLET ORAL EVERY 6 HOURS PRN
Qty: 56 TABLET | Refills: 0 | Status: SHIPPED | OUTPATIENT
Start: 2024-12-30 | End: 2025-02-24

## 2024-12-30 RX ORDER — GABAPENTIN 400 MG/1
CAPSULE ORAL
Qty: 120 CAPSULE | Refills: 1 | Status: SHIPPED | OUTPATIENT
Start: 2024-12-30 | End: 2025-03-07

## 2024-12-30 RX ORDER — CELECOXIB 200 MG/1
200 CAPSULE ORAL DAILY PRN
Qty: 30 CAPSULE | Refills: 1 | Status: SHIPPED | OUTPATIENT
Start: 2024-12-30

## 2024-12-30 RX ORDER — METHOCARBAMOL 500 MG/1
500 TABLET, FILM COATED ORAL 2 TIMES DAILY PRN
Qty: 45 TABLET | Refills: 0 | Status: SHIPPED | OUTPATIENT
Start: 2024-12-30

## 2024-12-30 NOTE — PROGRESS NOTES
Bailey Torres  1985  [id number]      HISTORY OF PRESENT ILLNESS:  Ms. Shona Torres is a 39 y.o. female returns for a follow up visit for pain management  She has a diagnosis of   1. Chronic pain syndrome    2. Fibromyalgia    3. At risk for respiratory depression due to opioid    4. Pelvic pain syndrome    5. Chronic pelvic pain in female    6. Endometriosis    .      As per information/history obtained from the PADT(patient assessment and documentation tool) -  She complains of pain in the neck, elbows Bilateral, abdomen, upper back, and lower back with radiation to the arms Bilateral, hands Bilateral, lower leg Bilateral, ankles Bilateral, and feet Bilateral She rates the pain 7/10 and describes it as sharp, aching, numbness.  Pain is made worse by: movement, walking, standing, sitting, bending, lifting. She denies any side effects from the current pain regimen. Patient reports that since last follow up visit the physical functioning is unchanged, family/social relationships are better, mood is unchanged sleep patterns are worse. Ms. Shona Torres states that since starting the treatment with the current regimen the  overall functioning  in the above aspects is  better, Patient denies misusing/abusing her narcotic pain medications or using any illegal drugs.  There are No indicators for possible drug abuse, addiction or diversion problems.   Upon obtaining the medical history from Ms. Shona Torres regarding today's office visit for her presenting problems, patient states she has been doing fair. Ms. Shona Torres reports she is working full time, she is working 2 jobs. She states she is having some GERD like symptoms. Patient states she is off Celebrex but does need it bad when the flare up occurs. Patient mentions she is using Percocet 1/2-1 tablet a day. She says she is using heating pads.       ALLERGIES: Patients list of allergies were reviewed     MEDICATIONS: Ms. Shona Torres list of medications

## 2025-02-07 DIAGNOSIS — R11.0 NAUSEA: ICD-10-CM

## 2025-02-07 RX ORDER — ONDANSETRON 4 MG/1
TABLET, ORALLY DISINTEGRATING ORAL
Qty: 90 TABLET | Refills: 0 | Status: SHIPPED | OUTPATIENT
Start: 2025-02-07

## 2025-02-07 NOTE — TELEPHONE ENCOUNTER
Last Office Visit  -  8/15/24  Next Office Visit  -  n/a    Last Filled  -    Last UDS -    Contract -

## 2025-03-14 ENCOUNTER — OFFICE VISIT (OUTPATIENT)
Dept: PAIN MANAGEMENT | Age: 40
End: 2025-03-14
Payer: COMMERCIAL

## 2025-03-14 ENCOUNTER — TELEPHONE (OUTPATIENT)
Dept: PAIN MANAGEMENT | Age: 40
End: 2025-03-14

## 2025-03-14 VITALS
OXYGEN SATURATION: 95 % | BODY MASS INDEX: 21.63 KG/M2 | DIASTOLIC BLOOD PRESSURE: 69 MMHG | HEART RATE: 91 BPM | WEIGHT: 126 LBS | SYSTOLIC BLOOD PRESSURE: 116 MMHG

## 2025-03-14 DIAGNOSIS — R10.2 PELVIC PAIN SYNDROME: ICD-10-CM

## 2025-03-14 DIAGNOSIS — R10.2 CHRONIC PELVIC PAIN IN FEMALE: ICD-10-CM

## 2025-03-14 DIAGNOSIS — M79.7 FIBROMYALGIA: ICD-10-CM

## 2025-03-14 DIAGNOSIS — G89.29 CHRONIC PELVIC PAIN IN FEMALE: ICD-10-CM

## 2025-03-14 DIAGNOSIS — N80.9 ENDOMETRIOSIS: ICD-10-CM

## 2025-03-14 DIAGNOSIS — Z91.89 AT RISK FOR RESPIRATORY DEPRESSION DUE TO OPIOID: ICD-10-CM

## 2025-03-14 DIAGNOSIS — G89.4 CHRONIC PAIN SYNDROME: ICD-10-CM

## 2025-03-14 PROCEDURE — 99213 OFFICE O/P EST LOW 20 MIN: CPT | Performed by: INTERNAL MEDICINE

## 2025-03-14 RX ORDER — CELECOXIB 200 MG/1
200 CAPSULE ORAL DAILY PRN
Qty: 30 CAPSULE | Refills: 1 | Status: SHIPPED | OUTPATIENT
Start: 2025-03-14

## 2025-03-14 RX ORDER — METHOCARBAMOL 500 MG/1
500 TABLET, FILM COATED ORAL 2 TIMES DAILY PRN
Qty: 45 TABLET | Refills: 0 | Status: SHIPPED | OUTPATIENT
Start: 2025-03-14

## 2025-03-14 RX ORDER — GABAPENTIN 400 MG/1
CAPSULE ORAL
Qty: 120 CAPSULE | Refills: 1 | Status: SHIPPED | OUTPATIENT
Start: 2025-03-14 | End: 2025-05-20

## 2025-03-14 RX ORDER — OXYCODONE AND ACETAMINOPHEN 5; 325 MG/1; MG/1
0.5 TABLET ORAL EVERY 6 HOURS PRN
Qty: 56 TABLET | Refills: 0 | Status: SHIPPED | OUTPATIENT
Start: 2025-03-14 | End: 2025-05-09

## 2025-03-14 NOTE — TELEPHONE ENCOUNTER
Submitted PA for Oxycodone-Acetaminophen Via [facility] Key: [id number] STATUS: NOT SENT    Please complete/sign Chart Note.    Once complete, respond to the pool ( P [facility] MEDICATION PRE-AUTH).      Thank you.

## 2025-03-14 NOTE — PROGRESS NOTES
Refill    ondansetron (ZOFRAN-ODT) 4 MG disintegrating tablet DISSOLVE 1 TABLET ON THE TONGUE EVERY 8 HOURS AS NEEDED FOR NAUSEA 90 tablet 0    celecoxib (CELEBREX) 200 MG capsule Take 1 capsule by mouth daily as needed for Pain 30 capsule 1    methocarbamol (ROBAXIN) 500 MG tablet Take 1 tablet by mouth 2 times daily as needed (for muscles spasms/stiffness) 45 tablet 0    naloxone 4 MG/0.1ML LIQD nasal spray 1 spray by Nasal route as needed for Opioid Reversal 1 each 0    sertraline (ZOLOFT) 50 MG tablet Take 1 tablet by mouth daily 90 tablet 1    fluticasone-salmeterol (ADVAIR) 250-50 MCG/ACT AEPB diskus inhaler Inhale 1 puff into the lungs in the morning and 1 puff in the evening. 60 each 3    Benzoyl Peroxide 2.5 % CREA Apply 1 applicator topically every evening 21 g 0    Probiotic Product (PROBIOTIC DAILY PO) Take by mouth daily      calcium carbonate (TUMS) 500 MG chewable tablet Take 1 tablet by mouth daily      Multiple Vitamin (MULTI-VITAMIN DAILY PO) Take  by mouth.      gabapentin (NEURONTIN) 400 MG capsule Take one capsule by mouth in the morning, take one capsules by mouth in the evening, take two capsules by mouth in the evening 120 capsule 1     No facility-administered medications prior to visit.        REVIEW OF SYSTEMS:    Respiratory: Negative for apnea, chest tightness and shortness of breath or change in baseline breathing.      PHYSICAL EXAM:   Nursing note and vitals reviewed. /69   Pulse 91   Wt 57.2 kg (126 lb)   SpO2 95%   BMI 21.63 kg/m²   Constitutional: She appears well-developed and well-nourished. No acute distress.   Cardiovascular: Normal rate, regular rhythm, normal heart sounds, and does not have murmur.     Pulmonary/Chest: Effort normal. No respiratory distress. She does not have wheezes in the lung fields. She has no rales.     Musculo-Skeletal/Extremities:Gait is normal, assistive devices use: none.    She exhibits edema: none, and no tenderness.

## 2025-03-19 NOTE — TELEPHONE ENCOUNTER
This medication is APPROVED 3/19/25.    Authorization Expiration Date: 6/17/2025.    Pharmacy has been notified. Thank you!

## 2025-03-19 NOTE — TELEPHONE ENCOUNTER
Submitted PA for Oxycodone-Acetaminophen Via [facility] Key: [id number] STATUS: PENDING    Follow up done daily; if no decision with in three days we will refax.  If another three days goes by with no decision will call the insurance for status.

## 2025-05-12 ENCOUNTER — OFFICE VISIT (OUTPATIENT)
Dept: PAIN MANAGEMENT | Age: 40
End: 2025-05-12
Payer: COMMERCIAL

## 2025-05-12 VITALS
DIASTOLIC BLOOD PRESSURE: 67 MMHG | WEIGHT: 126.6 LBS | HEIGHT: 64 IN | TEMPERATURE: 97.5 F | BODY MASS INDEX: 21.61 KG/M2 | OXYGEN SATURATION: 98 % | HEART RATE: 58 BPM | SYSTOLIC BLOOD PRESSURE: 100 MMHG

## 2025-05-12 DIAGNOSIS — G89.4 CHRONIC PAIN SYNDROME: ICD-10-CM

## 2025-05-12 DIAGNOSIS — G89.29 CHRONIC PELVIC PAIN IN FEMALE: ICD-10-CM

## 2025-05-12 DIAGNOSIS — N80.9 ENDOMETRIOSIS: ICD-10-CM

## 2025-05-12 DIAGNOSIS — R10.2 PELVIC PAIN SYNDROME: ICD-10-CM

## 2025-05-12 DIAGNOSIS — R10.2 CHRONIC PELVIC PAIN IN FEMALE: ICD-10-CM

## 2025-05-12 DIAGNOSIS — M79.7 FIBROMYALGIA: ICD-10-CM

## 2025-05-12 DIAGNOSIS — M54.2 NECK PAIN: ICD-10-CM

## 2025-05-12 PROCEDURE — 99213 OFFICE O/P EST LOW 20 MIN: CPT | Performed by: INTERNAL MEDICINE

## 2025-05-12 RX ORDER — METHOCARBAMOL 500 MG/1
500 TABLET, FILM COATED ORAL 2 TIMES DAILY PRN
Qty: 45 TABLET | Refills: 0 | Status: SHIPPED | OUTPATIENT
Start: 2025-05-12

## 2025-05-12 RX ORDER — CELECOXIB 200 MG/1
200 CAPSULE ORAL DAILY PRN
Qty: 30 CAPSULE | Refills: 1 | Status: SHIPPED | OUTPATIENT
Start: 2025-05-12

## 2025-05-12 RX ORDER — GABAPENTIN 400 MG/1
CAPSULE ORAL
Qty: 120 CAPSULE | Refills: 1 | Status: SHIPPED | OUTPATIENT
Start: 2025-05-12 | End: 2025-07-18

## 2025-05-12 RX ORDER — OXYCODONE AND ACETAMINOPHEN 5; 325 MG/1; MG/1
0.5 TABLET ORAL EVERY 6 HOURS PRN
Qty: 56 TABLET | Refills: 0 | Status: SHIPPED | OUTPATIENT
Start: 2025-05-12 | End: 2025-07-07

## 2025-05-12 NOTE — PROGRESS NOTES
TABLET ON THE TONGUE EVERY 8 HOURS AS NEEDED FOR NAUSEA 90 tablet 0    naloxone 4 MG/0.1ML LIQD nasal spray 1 spray by Nasal route as needed for Opioid Reversal 1 each 0    sertraline (ZOLOFT) 50 MG tablet Take 1 tablet by mouth daily 90 tablet 1    fluticasone-salmeterol (ADVAIR) 250-50 MCG/ACT AEPB diskus inhaler Inhale 1 puff into the lungs in the morning and 1 puff in the evening. 60 each 3    Benzoyl Peroxide 2.5 % CREA Apply 1 applicator topically every evening 21 g 0    Probiotic Product (PROBIOTIC DAILY PO) Take by mouth daily      calcium carbonate (TUMS) 500 MG chewable tablet Take 1 tablet by mouth daily      Multiple Vitamin (MULTI-VITAMIN DAILY PO) Take  by mouth.       No current facility-administered medications for this visit.       General Goals of current treatment regimen include improvement in pain, restoration of functioning- with focus on improvement in physical performance, general activity, work or disability,emotional distress, health care utilization and  decreased medication consumption.   Will continue to monitor progress towards achieving/maintaining therapeutic goals with special emphasis on  1. -Improvement in perceived interfernce  of pain with ADL's. YES  -Ability to do home exercises independently. YES  -Ability to do household chores, indoor work and social and leisure activities. YES  -Improve psychosocial and physical functioning.YES  -Ability to do outside chores/ yard work YES       2. -Ability to focus/concentrate at work and perform the duties required of her at work.YES  -Sit through a workday without increased pain YES  -Stand 30-60 minutes without lower extremity symptoms. Ability to go up and down stairs or walk independently{ YES  -Ability to lift up to 10-20 lbs. YES     She was advised against drinking alcohol with the narcotic pain medicines, advised against driving or handling machinery while adjusting the dose of medicines or if having cognitive  issues related to

## 2025-05-13 RX ORDER — CELECOXIB 200 MG/1
200 CAPSULE ORAL DAILY PRN
Qty: 90 CAPSULE | OUTPATIENT
Start: 2025-05-13

## 2025-06-04 DIAGNOSIS — F41.9 ANXIETY: ICD-10-CM

## 2025-06-13 ENCOUNTER — TELEPHONE (OUTPATIENT)
Dept: PAIN MANAGEMENT | Age: 40
End: 2025-06-13

## 2025-06-20 RX ORDER — MELOXICAM 15 MG/1
15 TABLET ORAL DAILY
Qty: 30 TABLET | Refills: 0 | Status: CANCELLED | OUTPATIENT
Start: 2025-06-20

## 2025-06-26 DIAGNOSIS — F41.9 ANXIETY: ICD-10-CM

## 2025-07-07 ENCOUNTER — TELEPHONE (OUTPATIENT)
Dept: PAIN MANAGEMENT | Age: 40
End: 2025-07-07

## 2025-07-07 ENCOUNTER — OFFICE VISIT (OUTPATIENT)
Dept: PAIN MANAGEMENT | Age: 40
End: 2025-07-07
Payer: COMMERCIAL

## 2025-07-07 VITALS
OXYGEN SATURATION: 96 % | HEART RATE: 63 BPM | SYSTOLIC BLOOD PRESSURE: 112 MMHG | BODY MASS INDEX: 21.63 KG/M2 | WEIGHT: 126 LBS | DIASTOLIC BLOOD PRESSURE: 71 MMHG

## 2025-07-07 DIAGNOSIS — N80.9 ENDOMETRIOSIS: ICD-10-CM

## 2025-07-07 DIAGNOSIS — G89.29 CHRONIC PELVIC PAIN IN FEMALE: ICD-10-CM

## 2025-07-07 DIAGNOSIS — R10.2 PELVIC PAIN SYNDROME: ICD-10-CM

## 2025-07-07 DIAGNOSIS — M79.7 FIBROMYALGIA: ICD-10-CM

## 2025-07-07 DIAGNOSIS — R10.2 CHRONIC PELVIC PAIN IN FEMALE: ICD-10-CM

## 2025-07-07 DIAGNOSIS — G89.4 CHRONIC PAIN SYNDROME: ICD-10-CM

## 2025-07-07 PROCEDURE — 99213 OFFICE O/P EST LOW 20 MIN: CPT | Performed by: INTERNAL MEDICINE

## 2025-07-07 RX ORDER — GABAPENTIN 400 MG/1
CAPSULE ORAL
Qty: 120 CAPSULE | Refills: 1 | Status: SHIPPED | OUTPATIENT
Start: 2025-07-07 | End: 2025-09-12

## 2025-07-07 RX ORDER — METHOCARBAMOL 500 MG/1
500 TABLET, FILM COATED ORAL 2 TIMES DAILY PRN
Qty: 45 TABLET | Refills: 0 | Status: SHIPPED | OUTPATIENT
Start: 2025-07-07

## 2025-07-07 RX ORDER — OXYCODONE AND ACETAMINOPHEN 5; 325 MG/1; MG/1
0.5 TABLET ORAL EVERY 6 HOURS PRN
Qty: 56 TABLET | Refills: 0 | Status: SHIPPED | OUTPATIENT
Start: 2025-07-07 | End: 2025-09-01

## 2025-07-07 NOTE — PROGRESS NOTES
Bailey Torres  1985  [id number]      HISTORY OF PRESENT ILLNESS:  Ms. Shona Torres is a 39 y.o. female returns for a follow up visit for pain management  She has a diagnosis of   1. Chronic pain syndrome    2. Fibromyalgia    3. Pelvic pain syndrome    4. Endometriosis    5. Chronic pelvic pain in female    .      As per information/history obtained from the PADT(patient assessment and documentation tool) -  She complains of pain in the neck and lower back with radiation to the shoulders Bilateral, arms Bilateral, elbows Bilateral, hands Bilateral, buttocks, hips Bilateral, upper leg Bilateral, knees Bilateral, lower leg Bilateral, ankles Bilateral, and feet Bilateral She rates the pain 7/10 and describes it as aching.  Pain is made worse by: movement, walking, standing, sitting, bending, lifting. She denies any side effects from the current pain regimen. Patient reports that since last follow up visit the physical functioning is worse, family/social relationships are unchanged, mood is unchanged sleep patterns are unchanged. Ms. Shona Torres states that since starting the treatment with the current regimen the  overall functioning  in the above aspects is  better, Patient denies misusing/abusing her narcotic pain medications or using any illegal drugs.  There are No indicators for possible drug abuse, addiction or diversion problems.   Upon obtaining the medical history from Ms. Shona Torres regarding today's office visit for her presenting problems, patient reports she has been doing fair, working full time and OT hours. She says she's using Celebrex and Robaxin as needed. She mentions she's using Percocet 1 per day. She mentions the pain has been manageable. She denies any ER visit. She says [facility] unit is still pending. She complains of tension and spasms in the neck.       ALLERGIES: Patients list of allergies were reviewed     MEDICATIONS: Ms. Shona Torres list of medications were reviewed.Her